# Patient Record
Sex: FEMALE | Race: WHITE | Employment: FULL TIME | URBAN - METROPOLITAN AREA
[De-identification: names, ages, dates, MRNs, and addresses within clinical notes are randomized per-mention and may not be internally consistent; named-entity substitution may affect disease eponyms.]

---

## 2017-02-03 ENCOUNTER — TELEPHONE (OUTPATIENT)
Dept: INTERNAL MEDICINE CLINIC | Age: 37
End: 2017-02-03

## 2017-02-03 ENCOUNTER — OFFICE VISIT (OUTPATIENT)
Dept: INTERNAL MEDICINE CLINIC | Age: 37
End: 2017-02-03

## 2017-02-03 VITALS — HEART RATE: 60 BPM | DIASTOLIC BLOOD PRESSURE: 80 MMHG | SYSTOLIC BLOOD PRESSURE: 118 MMHG

## 2017-02-03 DIAGNOSIS — L30.8 OTHER ECZEMA: Primary | ICD-10-CM

## 2017-02-03 PROCEDURE — 99212 OFFICE O/P EST SF 10 MIN: CPT | Performed by: INTERNAL MEDICINE

## 2017-02-03 RX ORDER — TRIAMCINOLONE ACETONIDE 1 MG/G
CREAM TOPICAL
Qty: 453 G | Refills: 3 | Status: SHIPPED | OUTPATIENT
Start: 2017-02-03 | End: 2018-08-10

## 2017-02-22 ENCOUNTER — TELEPHONE (OUTPATIENT)
Dept: INTERNAL MEDICINE CLINIC | Age: 37
End: 2017-02-22

## 2017-02-22 ENCOUNTER — TELEMEDICINE (OUTPATIENT)
Dept: INTERNAL MEDICINE CLINIC | Age: 37
End: 2017-02-22

## 2017-02-22 DIAGNOSIS — R53.81 MALAISE: ICD-10-CM

## 2017-02-22 DIAGNOSIS — R21 RASH AND NONSPECIFIC SKIN ERUPTION: Primary | ICD-10-CM

## 2017-02-22 PROCEDURE — 99213 OFFICE O/P EST LOW 20 MIN: CPT | Performed by: INTERNAL MEDICINE

## 2017-02-22 RX ORDER — ACETAMINOPHEN 500 MG
1000 TABLET ORAL EVERY 6 HOURS PRN
Qty: 120 TABLET | Refills: 3 | Status: SHIPPED | OUTPATIENT
Start: 2017-02-22

## 2017-02-22 RX ORDER — PREDNISONE 20 MG/1
20 TABLET ORAL DAILY
Qty: 5 TABLET | Refills: 0 | Status: SHIPPED | OUTPATIENT
Start: 2017-02-22 | End: 2017-02-27

## 2017-02-28 ENCOUNTER — TELEPHONE (OUTPATIENT)
Dept: DERMATOLOGY | Age: 37
End: 2017-02-28

## 2017-02-28 ENCOUNTER — TELEPHONE (OUTPATIENT)
Dept: INTERNAL MEDICINE CLINIC | Age: 37
End: 2017-02-28

## 2017-03-01 ENCOUNTER — OFFICE VISIT (OUTPATIENT)
Dept: DERMATOLOGY | Age: 37
End: 2017-03-01

## 2017-03-01 DIAGNOSIS — L50.8 ACUTE URTICARIA: Primary | ICD-10-CM

## 2017-03-01 PROCEDURE — 99202 OFFICE O/P NEW SF 15 MIN: CPT | Performed by: DERMATOLOGY

## 2017-03-01 RX ORDER — HYDROXYZINE HYDROCHLORIDE 25 MG/1
TABLET, FILM COATED ORAL
Qty: 60 TABLET | Refills: 1 | Status: SHIPPED | OUTPATIENT
Start: 2017-03-01 | End: 2017-08-02 | Stop reason: CLARIF

## 2017-03-01 RX ORDER — LORATADINE 10 MG/1
10 TABLET ORAL DAILY
Qty: 30 TABLET | Refills: 2 | Status: SHIPPED | OUTPATIENT
Start: 2017-03-01 | End: 2018-08-10

## 2017-03-02 RX ORDER — PREDNISONE 10 MG/1
TABLET ORAL
Qty: 40 TABLET | Refills: 0 | Status: SHIPPED | OUTPATIENT
Start: 2017-03-02 | End: 2017-06-09

## 2017-03-13 ENCOUNTER — OFFICE VISIT (OUTPATIENT)
Dept: DERMATOLOGY | Age: 37
End: 2017-03-13

## 2017-03-13 DIAGNOSIS — L50.8 ACUTE URTICARIA: Primary | ICD-10-CM

## 2017-03-13 PROCEDURE — 99213 OFFICE O/P EST LOW 20 MIN: CPT | Performed by: DERMATOLOGY

## 2017-04-10 ENCOUNTER — OFFICE VISIT (OUTPATIENT)
Dept: DERMATOLOGY | Age: 37
End: 2017-04-10

## 2017-04-10 DIAGNOSIS — L73.2 HIDRADENITIS SUPPURATIVA: ICD-10-CM

## 2017-04-10 DIAGNOSIS — L50.8 ACUTE URTICARIA: Primary | ICD-10-CM

## 2017-04-10 PROCEDURE — 99213 OFFICE O/P EST LOW 20 MIN: CPT | Performed by: DERMATOLOGY

## 2017-04-10 RX ORDER — MINOCYCLINE HYDROCHLORIDE 50 MG/1
50 CAPSULE ORAL 2 TIMES DAILY
Qty: 60 CAPSULE | Refills: 2 | Status: SHIPPED | OUTPATIENT
Start: 2017-04-10 | End: 2017-07-21 | Stop reason: SINTOL

## 2017-04-10 RX ORDER — CLINDAMYCIN PHOSPHATE 11.9 MG/ML
SOLUTION TOPICAL
Qty: 60 ML | Refills: 4 | Status: SHIPPED | OUTPATIENT
Start: 2017-04-10

## 2017-05-31 DIAGNOSIS — F41.9 ANXIETY: Primary | Chronic | ICD-10-CM

## 2017-06-26 ENCOUNTER — OFFICE VISIT (OUTPATIENT)
Dept: INTERNAL MEDICINE CLINIC | Age: 37
End: 2017-06-26

## 2017-06-26 VITALS
DIASTOLIC BLOOD PRESSURE: 88 MMHG | WEIGHT: 293 LBS | HEART RATE: 68 BPM | SYSTOLIC BLOOD PRESSURE: 132 MMHG | BODY MASS INDEX: 44.85 KG/M2

## 2017-06-26 DIAGNOSIS — F41.9 ANXIETY: Chronic | ICD-10-CM

## 2017-06-26 DIAGNOSIS — E66.01 MORBID OBESITY DUE TO EXCESS CALORIES (HCC): Chronic | ICD-10-CM

## 2017-06-26 DIAGNOSIS — E03.9 HYPOTHYROIDISM (ACQUIRED): Primary | Chronic | ICD-10-CM

## 2017-06-26 DIAGNOSIS — K52.9 ACUTE GASTROENTERITIS: ICD-10-CM

## 2017-06-26 LAB
A/G RATIO: 1.8 (ref 1.1–2.2)
ALBUMIN SERPL-MCNC: 4.2 G/DL (ref 3.4–5)
ALP BLD-CCNC: 71 U/L (ref 40–129)
ALT SERPL-CCNC: 11 U/L (ref 10–40)
ANION GAP SERPL CALCULATED.3IONS-SCNC: 13 MMOL/L (ref 3–16)
AST SERPL-CCNC: 11 U/L (ref 15–37)
BASOPHILS ABSOLUTE: 0 K/UL (ref 0–0.2)
BASOPHILS RELATIVE PERCENT: 0.5 %
BILIRUB SERPL-MCNC: <0.2 MG/DL (ref 0–1)
BILIRUBIN, POC: NORMAL
BLOOD URINE, POC: NORMAL
BUN BLDV-MCNC: 13 MG/DL (ref 7–20)
CALCIUM SERPL-MCNC: 9.4 MG/DL (ref 8.3–10.6)
CHLORIDE BLD-SCNC: 103 MMOL/L (ref 99–110)
CLARITY, POC: NORMAL
CO2: 26 MMOL/L (ref 21–32)
COLOR, POC: NORMAL
CREAT SERPL-MCNC: 0.7 MG/DL (ref 0.6–1.1)
EOSINOPHILS ABSOLUTE: 0.3 K/UL (ref 0–0.6)
EOSINOPHILS RELATIVE PERCENT: 4.8 %
GFR AFRICAN AMERICAN: >60
GFR NON-AFRICAN AMERICAN: >60
GLOBULIN: 2.4 G/DL
GLUCOSE BLD-MCNC: 116 MG/DL (ref 70–99)
GLUCOSE URINE, POC: NORMAL
HCT VFR BLD CALC: 41.8 % (ref 36–48)
HEMOGLOBIN: 13.9 G/DL (ref 12–16)
KETONES, POC: NORMAL
LEUKOCYTE EST, POC: NORMAL
LYMPHOCYTES ABSOLUTE: 1.7 K/UL (ref 1–5.1)
LYMPHOCYTES RELATIVE PERCENT: 26.3 %
MCH RBC QN AUTO: 29.3 PG (ref 26–34)
MCHC RBC AUTO-ENTMCNC: 33.2 G/DL (ref 31–36)
MCV RBC AUTO: 88.4 FL (ref 80–100)
MONOCYTES ABSOLUTE: 0.4 K/UL (ref 0–1.3)
MONOCYTES RELATIVE PERCENT: 5.9 %
NEUTROPHILS ABSOLUTE: 4.1 K/UL (ref 1.7–7.7)
NEUTROPHILS RELATIVE PERCENT: 62.5 %
NITRITE, POC: NORMAL
PDW BLD-RTO: 14 % (ref 12.4–15.4)
PH, POC: 6
PLATELET # BLD: 190 K/UL (ref 135–450)
PMV BLD AUTO: 9.9 FL (ref 5–10.5)
POTASSIUM SERPL-SCNC: 4 MMOL/L (ref 3.5–5.1)
PROTEIN, POC: NORMAL
RBC # BLD: 4.73 M/UL (ref 4–5.2)
SODIUM BLD-SCNC: 142 MMOL/L (ref 136–145)
SPECIFIC GRAVITY, POC: 1.02
T4 FREE: 1.1 NG/DL (ref 0.9–1.8)
TOTAL PROTEIN: 6.6 G/DL (ref 6.4–8.2)
TSH SERPL DL<=0.05 MIU/L-ACNC: 3.25 UIU/ML (ref 0.27–4.2)
UROBILINOGEN, POC: 0.2
WBC # BLD: 6.6 K/UL (ref 4–11)

## 2017-06-26 PROCEDURE — 99213 OFFICE O/P EST LOW 20 MIN: CPT | Performed by: INTERNAL MEDICINE

## 2017-06-26 PROCEDURE — 81002 URINALYSIS NONAUTO W/O SCOPE: CPT | Performed by: INTERNAL MEDICINE

## 2017-06-26 RX ORDER — DIPHENOXYLATE HYDROCHLORIDE AND ATROPINE SULFATE 2.5; .025 MG/1; MG/1
TABLET ORAL
Qty: 30 TABLET | Refills: 1 | Status: SHIPPED | OUTPATIENT
Start: 2017-06-26 | End: 2017-07-06

## 2017-06-26 RX ORDER — ONDANSETRON 4 MG/1
4 TABLET, FILM COATED ORAL EVERY 8 HOURS PRN
Qty: 30 TABLET | Refills: 0 | Status: SHIPPED | OUTPATIENT
Start: 2017-06-26 | End: 2017-10-11 | Stop reason: CLARIF

## 2017-07-27 ENCOUNTER — TELEPHONE (OUTPATIENT)
Dept: DERMATOLOGY | Age: 37
End: 2017-07-27

## 2017-07-27 DIAGNOSIS — T36.4X5A: Primary | ICD-10-CM

## 2017-07-27 DIAGNOSIS — H53.9 VISION CHANGES: ICD-10-CM

## 2017-08-02 ENCOUNTER — OFFICE VISIT (OUTPATIENT)
Dept: NEUROLOGY | Age: 37
End: 2017-08-02

## 2017-08-02 VITALS
SYSTOLIC BLOOD PRESSURE: 155 MMHG | WEIGHT: 289 LBS | DIASTOLIC BLOOD PRESSURE: 107 MMHG | BODY MASS INDEX: 43.94 KG/M2 | HEART RATE: 75 BPM

## 2017-08-02 DIAGNOSIS — G93.2 PSEUDOTUMOR CEREBRI: Primary | ICD-10-CM

## 2017-08-02 DIAGNOSIS — E66.01 MORBID OBESITY DUE TO EXCESS CALORIES (HCC): Chronic | ICD-10-CM

## 2017-08-02 DIAGNOSIS — H47.11 BILATERAL PAPILLEDEMA DUE TO RAISED INTRACRANIAL PRESSURE: ICD-10-CM

## 2017-08-02 PROCEDURE — 99245 OFF/OP CONSLTJ NEW/EST HI 55: CPT | Performed by: PSYCHIATRY & NEUROLOGY

## 2017-08-03 ENCOUNTER — HOSPITAL ENCOUNTER (OUTPATIENT)
Dept: GENERAL RADIOLOGY | Age: 37
Discharge: HOME | End: 2017-08-03
Attending: PSYCHIATRY & NEUROLOGY | Admitting: PSYCHIATRY & NEUROLOGY

## 2017-08-03 VITALS
OXYGEN SATURATION: 99 % | HEIGHT: 68 IN | SYSTOLIC BLOOD PRESSURE: 135 MMHG | WEIGHT: 289 LBS | RESPIRATION RATE: 16 BRPM | BODY MASS INDEX: 43.8 KG/M2 | HEART RATE: 65 BPM | DIASTOLIC BLOOD PRESSURE: 69 MMHG | TEMPERATURE: 97 F

## 2017-08-03 DIAGNOSIS — H47.11 BILATERAL PAPILLEDEMA DUE TO RAISED INTRACRANIAL PRESSURE: ICD-10-CM

## 2017-08-03 DIAGNOSIS — G93.2 BENIGN INTRACRANIAL HYPERTENSION: ICD-10-CM

## 2017-08-03 DIAGNOSIS — G93.2 PSEUDOTUMOR CEREBRI: ICD-10-CM

## 2017-08-03 LAB
APPEARANCE CSF: CLEAR
APTT: 34 SEC (ref 24.1–34.9)
CLOT EVALUATION CSF: ABNORMAL
COLOR CSF: COLORLESS
GLUCOSE, CSF: 58 MG/DL (ref 40–80)
INR BLD: 1 (ref 0.85–1.15)
LYMPHS CSF: 100 % (ref 40–80)
NO DIFFERENTIAL CSF: ABNORMAL
NUMBER OF CELLS CSF: 3
PROTEIN CSF: 32 MG/DL (ref 15–45)
PROTHROMBIN TIME: 11.3 SEC (ref 9.6–13)
RBC CSF: 3 /CUMM
TUBE NUMBER CSF: ABNORMAL
TUBE NUMBER CSF: NORMAL
VOLUME CSF: 4 ML
VOLUME CSF: 5 ML
WBC CSF: 2 /CUMM (ref 0–5)

## 2017-08-03 RX ORDER — SODIUM CHLORIDE 9 MG/ML
INJECTION, SOLUTION INTRAVENOUS
Status: DISPENSED
Start: 2017-08-03 | End: 2017-08-03

## 2017-08-03 RX ORDER — ACETAMINOPHEN 325 MG/1
650 TABLET ORAL EVERY 4 HOURS PRN
Status: DISCONTINUED | OUTPATIENT
Start: 2017-08-03 | End: 2017-08-04 | Stop reason: HOSPADM

## 2017-08-03 RX ORDER — LIDOCAINE HYDROCHLORIDE 10 MG/ML
5 INJECTION, SOLUTION EPIDURAL; INFILTRATION; INTRACAUDAL; PERINEURAL ONCE
Status: COMPLETED | OUTPATIENT
Start: 2017-08-03 | End: 2017-08-03

## 2017-08-03 RX ADMIN — ACETAMINOPHEN 650 MG: 325 TABLET ORAL at 13:45

## 2017-08-03 RX ADMIN — LIDOCAINE HYDROCHLORIDE 5 ML: 10 INJECTION, SOLUTION EPIDURAL; INFILTRATION; INTRACAUDAL; PERINEURAL at 13:16

## 2017-08-03 ASSESSMENT — PAIN DESCRIPTION - LOCATION
LOCATION: HEAD

## 2017-08-03 ASSESSMENT — PAIN SCALES - GENERAL
PAINLEVEL_OUTOF10: 7
PAINLEVEL_OUTOF10: 6
PAINLEVEL_OUTOF10: 8

## 2017-08-03 ASSESSMENT — PAIN DESCRIPTION - PAIN TYPE
TYPE: ACUTE PAIN
TYPE: CHRONIC PAIN
TYPE: ACUTE PAIN

## 2017-08-03 ASSESSMENT — PAIN DESCRIPTION - DESCRIPTORS
DESCRIPTORS: HEADACHE
DESCRIPTORS: HEADACHE

## 2017-08-03 ASSESSMENT — PAIN - FUNCTIONAL ASSESSMENT: PAIN_FUNCTIONAL_ASSESSMENT: 0-10

## 2017-08-07 ENCOUNTER — OFFICE VISIT (OUTPATIENT)
Dept: PSYCHIATRY | Age: 37
End: 2017-08-07

## 2017-08-07 ENCOUNTER — TELEPHONE (OUTPATIENT)
Dept: NEUROLOGY | Age: 37
End: 2017-08-07

## 2017-08-07 ENCOUNTER — HOSPITAL ENCOUNTER (OUTPATIENT)
Dept: SURGERY | Age: 37
Discharge: HOME | End: 2017-08-07
Attending: FAMILY MEDICINE | Admitting: FAMILY MEDICINE

## 2017-08-07 VITALS
DIASTOLIC BLOOD PRESSURE: 84 MMHG | HEART RATE: 72 BPM | HEIGHT: 68 IN | BODY MASS INDEX: 44.41 KG/M2 | SYSTOLIC BLOOD PRESSURE: 128 MMHG | WEIGHT: 293 LBS

## 2017-08-07 VITALS
DIASTOLIC BLOOD PRESSURE: 108 MMHG | RESPIRATION RATE: 18 BRPM | SYSTOLIC BLOOD PRESSURE: 156 MMHG | HEART RATE: 76 BPM | OXYGEN SATURATION: 98 %

## 2017-08-07 DIAGNOSIS — F41.1 GENERALIZED ANXIETY DISORDER: ICD-10-CM

## 2017-08-07 DIAGNOSIS — G97.1 POST LUMBAR PUNCTURE HEADACHE: Primary | ICD-10-CM

## 2017-08-07 PROCEDURE — 99204 OFFICE O/P NEW MOD 45 MIN: CPT | Performed by: PSYCHIATRY & NEUROLOGY

## 2017-08-07 RX ORDER — DIAZEPAM 5 MG/1
5 TABLET ORAL EVERY 12 HOURS PRN
Qty: 60 TABLET | Refills: 1 | Status: SHIPPED | OUTPATIENT
Start: 2017-08-07 | End: 2017-10-04 | Stop reason: SDUPTHER

## 2017-08-07 RX ORDER — SODIUM CHLORIDE 9 MG/ML
INJECTION, SOLUTION INTRAVENOUS
Status: DISCONTINUED
Start: 2017-08-07 | End: 2017-08-08 | Stop reason: HOSPADM

## 2017-08-07 ASSESSMENT — PATIENT HEALTH QUESTIONNAIRE - PHQ9
9. THOUGHTS THAT YOU WOULD BE BETTER OFF DEAD, OR OF HURTING YOURSELF: 0
SUM OF ALL RESPONSES TO PHQ QUESTIONS 1-9: 1
1. LITTLE INTEREST OR PLEASURE IN DOING THINGS: 0
SUM OF ALL RESPONSES TO PHQ9 QUESTIONS 1 & 2: 0
6. FEELING BAD ABOUT YOURSELF - OR THAT YOU ARE A FAILURE OR HAVE LET YOURSELF OR YOUR FAMILY DOWN: 0
3. TROUBLE FALLING OR STAYING ASLEEP: 1
7. TROUBLE CONCENTRATING ON THINGS, SUCH AS READING THE NEWSPAPER OR WATCHING TELEVISION: 0
5. POOR APPETITE OR OVEREATING: 0
2. FEELING DOWN, DEPRESSED OR HOPELESS: 0
10. IF YOU CHECKED OFF ANY PROBLEMS, HOW DIFFICULT HAVE THESE PROBLEMS MADE IT FOR YOU TO DO YOUR WORK, TAKE CARE OF THINGS AT HOME, OR GET ALONG WITH OTHER PEOPLE: 0
4. FEELING TIRED OR HAVING LITTLE ENERGY: 0
8. MOVING OR SPEAKING SO SLOWLY THAT OTHER PEOPLE COULD HAVE NOTICED. OR THE OPPOSITE, BEING SO FIGETY OR RESTLESS THAT YOU HAVE BEEN MOVING AROUND A LOT MORE THAN USUAL: 0

## 2017-08-07 ASSESSMENT — ANXIETY QUESTIONNAIRES
3. WORRYING TOO MUCH ABOUT DIFFERENT THINGS: 1-SEVERAL DAYS
5. BEING SO RESTLESS THAT IT IS HARD TO SIT STILL: 0-NOT AT ALL SURE
7. FEELING AFRAID AS IF SOMETHING AWFUL MIGHT HAPPEN: 1-SEVERAL DAYS
2. NOT BEING ABLE TO STOP OR CONTROL WORRYING: 1-SEVERAL DAYS
4. TROUBLE RELAXING: 1-SEVERAL DAYS
1. FEELING NERVOUS, ANXIOUS, OR ON EDGE: 1-SEVERAL DAYS
GAD7 TOTAL SCORE: 6
6. BECOMING EASILY ANNOYED OR IRRITABLE: 1-SEVERAL DAYS

## 2017-08-07 ASSESSMENT — ENCOUNTER SYMPTOMS: SHORTNESS OF BREATH: 0

## 2017-08-09 ENCOUNTER — TELEPHONE (OUTPATIENT)
Dept: DERMATOLOGY | Age: 37
End: 2017-08-09

## 2017-08-09 ENCOUNTER — HOSPITAL ENCOUNTER (OUTPATIENT)
Dept: MRI IMAGING | Age: 37
Discharge: HOME | End: 2017-08-09
Attending: PSYCHIATRY & NEUROLOGY | Admitting: OPHTHALMOLOGY

## 2017-08-09 DIAGNOSIS — G93.2 BENIGN INTRACRANIAL HYPERTENSION: ICD-10-CM

## 2017-08-09 DIAGNOSIS — H47.10 PAPILLEDEMA: ICD-10-CM

## 2017-08-09 RX ORDER — 0.9 % SODIUM CHLORIDE 0.9 %
10 VIAL (ML) INJECTION
Status: COMPLETED | OUTPATIENT
Start: 2017-08-09 | End: 2017-08-09

## 2017-08-09 RX ADMIN — Medication 10 ML: at 10:27

## 2017-08-11 ENCOUNTER — OFFICE VISIT (OUTPATIENT)
Dept: NEUROLOGY | Age: 37
End: 2017-08-11

## 2017-08-11 VITALS
DIASTOLIC BLOOD PRESSURE: 107 MMHG | HEART RATE: 85 BPM | SYSTOLIC BLOOD PRESSURE: 156 MMHG | HEIGHT: 68 IN | BODY MASS INDEX: 44.1 KG/M2 | WEIGHT: 291 LBS

## 2017-08-11 DIAGNOSIS — E66.01 MORBID OBESITY DUE TO EXCESS CALORIES (HCC): Chronic | ICD-10-CM

## 2017-08-11 DIAGNOSIS — G93.2 PSEUDOTUMOR CEREBRI: Primary | ICD-10-CM

## 2017-08-11 DIAGNOSIS — H47.11 BILATERAL PAPILLEDEMA DUE TO RAISED INTRACRANIAL PRESSURE: ICD-10-CM

## 2017-08-11 PROCEDURE — 99214 OFFICE O/P EST MOD 30 MIN: CPT | Performed by: PSYCHIATRY & NEUROLOGY

## 2017-08-11 RX ORDER — ACETAZOLAMIDE 250 MG/1
250 TABLET ORAL 2 TIMES DAILY
Qty: 60 TABLET | Refills: 0 | Status: SHIPPED | OUTPATIENT
Start: 2017-08-11 | End: 2017-09-11 | Stop reason: SDUPTHER

## 2017-09-08 ENCOUNTER — HOSPITAL ENCOUNTER (OUTPATIENT)
Dept: OTHER | Age: 37
Discharge: HOME | End: 2017-09-08
Attending: PSYCHIATRY & NEUROLOGY | Admitting: PSYCHIATRY & NEUROLOGY

## 2017-09-08 DIAGNOSIS — G93.2 PSEUDOTUMOR CEREBRI: ICD-10-CM

## 2017-09-08 LAB
ANION GAP SERPL CALCULATED.3IONS-SCNC: 12 MMOL/L (ref 3–16)
BUN BLDV-MCNC: 17 MG/DL (ref 7–20)
CALCIUM SERPL-MCNC: 9.2 MG/DL (ref 8.3–10.6)
CHLORIDE BLD-SCNC: 109 MMOL/L (ref 99–110)
CO2: 20 MMOL/L (ref 21–32)
CREAT SERPL-MCNC: 0.9 MG/DL (ref 0.6–1.1)
GFR AFRICAN AMERICAN: >60
GFR NON-AFRICAN AMERICAN: >60
GLUCOSE BLD-MCNC: 96 MG/DL (ref 70–99)
POTASSIUM SERPL-SCNC: 4.3 MMOL/L (ref 3.5–5.1)
SODIUM BLD-SCNC: 141 MMOL/L (ref 136–145)

## 2017-09-11 ENCOUNTER — OFFICE VISIT (OUTPATIENT)
Dept: NEUROLOGY | Age: 37
End: 2017-09-11

## 2017-09-11 VITALS
WEIGHT: 279 LBS | SYSTOLIC BLOOD PRESSURE: 146 MMHG | BODY MASS INDEX: 42.28 KG/M2 | HEIGHT: 68 IN | DIASTOLIC BLOOD PRESSURE: 97 MMHG | HEART RATE: 65 BPM

## 2017-09-11 DIAGNOSIS — H53.8 BLURRED VISION: ICD-10-CM

## 2017-09-11 DIAGNOSIS — G93.2 PSEUDOTUMOR CEREBRI: ICD-10-CM

## 2017-09-11 DIAGNOSIS — E66.01 MORBID OBESITY DUE TO EXCESS CALORIES (HCC): Primary | Chronic | ICD-10-CM

## 2017-09-11 PROCEDURE — 99214 OFFICE O/P EST MOD 30 MIN: CPT | Performed by: PSYCHIATRY & NEUROLOGY

## 2017-09-11 RX ORDER — ACETAZOLAMIDE 250 MG/1
250 TABLET ORAL 2 TIMES DAILY
Qty: 60 TABLET | Refills: 0 | Status: SHIPPED | OUTPATIENT
Start: 2017-09-11 | End: 2017-10-11 | Stop reason: SDUPTHER

## 2017-09-19 ENCOUNTER — TELEPHONE (OUTPATIENT)
Dept: INTERNAL MEDICINE CLINIC | Age: 37
End: 2017-09-19

## 2017-10-04 ENCOUNTER — OFFICE VISIT (OUTPATIENT)
Dept: PSYCHIATRY | Age: 37
End: 2017-10-04

## 2017-10-04 VITALS
HEIGHT: 68 IN | SYSTOLIC BLOOD PRESSURE: 120 MMHG | HEART RATE: 60 BPM | BODY MASS INDEX: 42.04 KG/M2 | DIASTOLIC BLOOD PRESSURE: 78 MMHG | WEIGHT: 277.4 LBS

## 2017-10-04 DIAGNOSIS — F41.1 GENERALIZED ANXIETY DISORDER: Primary | ICD-10-CM

## 2017-10-04 DIAGNOSIS — F32.89 DEPRESSIVE DISORDER, NOT ELSEWHERE CLASSIFIED: ICD-10-CM

## 2017-10-04 PROCEDURE — 99214 OFFICE O/P EST MOD 30 MIN: CPT | Performed by: PSYCHIATRY & NEUROLOGY

## 2017-10-04 RX ORDER — DIAZEPAM 5 MG/1
5 TABLET ORAL EVERY 12 HOURS PRN
Qty: 60 TABLET | Refills: 1 | Status: SHIPPED | OUTPATIENT
Start: 2017-10-06 | End: 2017-11-20 | Stop reason: SDUPTHER

## 2017-10-04 NOTE — MR AVS SNAPSHOT
type 2 diabetes, stroke, gallstones, arthritis, sleep apnea, and certain cancers. BMI is not perfect. It may overestimate body fat in athletes and people who are more muscular. Even a small weight loss (between 5 and 10 percent of your current weight) by decreasing your calorie intake and becoming more physically active will help lower your risk of developing or worsening diseases associated with obesity. Learn more at: [email or web address]          Instructions    Counseling resources:   [facility]   Phone: [phone]  Locations: [place]: [facility], [address], [place], [address]: [facility], [facility], [address], [address], [place], [address] [facility]  [address]: [address] [place] [address]. [address], [facility], [address] [place] [address]  [address]: [address], [place], [address] [facility]  Phone: [phone]  Locations: [facility]: [address] [facility], [address] [facility], [place], [address] [facility]: [facility], [address], [facility], [address]  [facility]: [address], [address] [facility], [place], [address] [facility]: [address], [address] [facility], [address]: Adair Fuentes [place], [address] [facility][1]                                 Today's Medication Changes          These changes are accurate as of: 10/4/17 10:01 AM.  If you have any questions, ask your nurse or doctor. CHANGE how you take these medications           * sertraline 50 MG tablet   Commonly known as:  ZOLOFT   Instructions: Take 1 tablet by mouth daily   Quantity:  30 tablet   Refills:  1   What changed:  Another medication with the same name was added. Make sure you understand how and when to take each.    Changed by:  Jhonny Bartholomew MD       * sertraline 50 MG tablet   Commonly known as:  ZOLOFT Instructions: Take 1 tablet by mouth daily   Quantity:  30 tablet   Refills:  0   What changed: You were already taking a medication with the same name, and this prescription was added. Make sure you understand how and when to take each. Changed by:  Aida Torres MD       * Notice: This list has 2 medication(s) that are the same as other medications prescribed for you. Read the directions carefully, and ask your doctor or other care provider to review them with you. Where to Get Your Medications      These medications were sent to [address] [place] [address] [address]  [address]  Roger Hobson [address] [address]    Hours:  24-hours Phone:  [phone]     sertraline 50 MG tablet               Your Current Medications Are              sertraline (ZOLOFT) 50 MG tablet Take 1 tablet by mouth daily    spironolactone (ALDACTONE) 25 MG tablet TAKE 1 TABLET BY MOUTH DAILY    acetaZOLAMIDE (DIAMOX) 250 MG tablet Take 1 tablet by mouth 2 times daily    diazepam (VALIUM) 5 MG tablet Take 1 tablet by mouth every 12 hours as needed for Anxiety    sertraline (ZOLOFT) 50 MG tablet Take 1 tablet by mouth daily    levothyroxine (SYNTHROID) 125 MCG tablet TAKE 1 TABLET BY MOUTH DAILY    ondansetron (ZOFRAN) 4 MG tablet Take 1 tablet by mouth every 8 hours as needed for Nausea or Vomiting    clindamycin (CLEOCIN T) 1 % external solution Apply to affected areas twice daily. loratadine (CLARITIN) 10 MG tablet Take 1 tablet by mouth daily    acetaminophen (APAP EXTRA STRENGTH) 500 MG tablet Take 2 tablets by mouth every 6 hours as needed for Pain    triamcinolone (KENALOG) 0.1 % cream Apply topically 2 times daily.     valACYclovir (VALTREX) 500 MG tablet Take 500 mg by mouth 3 times daily as needed (GENITAL HERPES)       Allergies              Minocycline Other (See Comments)    Headache and dizziness    Nsaids Other (See Comments)    KIDNEY FAILURE Additional Information        Basic Information     Date Of Birth Sex Race Ethnicity Preferred Language    1980 Female White Non-/Non  English      Problem List as of 10/4/2017  Date Reviewed: 9/11/2017                 History  of genital herpes    Hypothyroidism (acquired) (Chronic)    Hidradenitis suppurativa (Chronic)    Ganglion cyst    Tobacco use disorder (Chronic)    Anxiety (Chronic)    Morbid obesity (HCC) (Chronic)    Depressive disorder, not elsewhere classified    Allergic rhinitis due to pollen      Immunizations as of 10/4/2017     Name Date    Influenza Virus Vaccine 10/4/2013    Tdap (Boostrix, Adacel) 2/23/2014      Preventive Care        Date Due    Pneumococcal Vaccine - Pneumovax for adults aged 19-64 years with: chronic heart disease, chronic lung disease, diabetes mellitus, alcoholism, chronic liver disease, or cigarette smoking. (1 of 1 - PPSV23) 8/10/2001    Pap Smear 7/12/2016    Yearly Flu Vaccine (1) 9/1/2017    Tetanus Combination Vaccine (2 - Td) 2/23/2024            "[employer]t Signup           Our records indicate that you have an active Smart Adventure account. You can view your After Visit Summary by going to [email or web address] and logging in with your Smart Adventure username and password. If you don't have a Smart Adventure username and password but a parent or guardian has access to your record, the parent or guardian should login with their own [employer] Adventure username and password and access your record to view the After Visit Summary. Additional Information  If you have questions, please contact the physician practice where you receive care. Remember, Smart Adventure is NOT to be used for urgent needs. For medical emergencies, dial 911. For questions regarding your Smart Adventure account call [phone]. If you have a clinical question, please call your doctor's office.

## 2017-10-04 NOTE — PATIENT INSTRUCTIONS
Counseling resources:   [facility]ing [facility]   Phone: [phone]  Locations: [place]: [facility], [address], [place], [address]: [address], [place], [address], [address], [facility], [address] [facility]  [address]: [facility], [facility], [address] [facility] [address]: [address], [place], [address] [facility]  Phone: [phone]  Locations:    [place]: [address] [facility], [address] [facility], [place], [address] [facility]: [facility], [address], [facility], [address]  [place]: [address], [address] [facility], [place], [address] [facility]: [address], [address] [facility] [address] [facility], [address]: Ad[place] Fuentes [place], [address] [facility]

## 2017-10-04 NOTE — PROGRESS NOTES
PSYCHIATRY PROGRESS NOTE    Bianca Rosas  1980  10/04/17  Face to Face time: 25 min  PCP: Jordyn Galeas MD    ASSESSMENT:   29 yo F who struggles with poor frustration tolerance and anxiety, sensitivity to rejection. There has been partial management with diazepam. She is feeling less reliant on it and some initial improvement at low dose od zoloft. She is having side effects from diamox that I don't feel are necessarily signs of side effects of zoloft or sx of her mood and anxiety disorder. I believe the primary issue may be a generalized anxiety coupled with borderline personality structure.      1. Generalized anxiety disorder  2. Unspecified depressive disorder  3. Tobacco use disorder  4. Pseudotumor cerebri, Hypothyroidism, morbid obesity, hidrandenitis suppurativa  5. Recent health issues, insurance and occupational stressors     PLAN:   1. Continue diazepam 5mg BID. 2. Increase zoloft to 50mg daily for 4 weeks. Notify me at that time how you are doing, we may increase the dose to 75mg at that time if no major benefits, otherwise continue 50mg daily until next visit. 3. Provided referral resources for psychotherapy again.     >50% of time spent on counseling and education regarding medication, side effects, including sexual functioning and side effects. Also discussed role of psychotherapy in her treatment.      Medication Monitoring:    - OARRS reviewed, no issues noted      Follow-up: RTC in 6-8 weeks   Safety: RF include anxiety, hx of self harm. Pt is low risk for future dangerousness to self or others at this time  _________________________________    CC:   Chief Complaint   Patient presents with    Anxiety     S: Patient reports improvement in anxiety and irritability with sertraline for about the first month she took it, however over the last month she feels this effect has subsided. She has only been using 25 mg daily, did not know we were increasing it to 50 mg.   She knows less reliance on the diazepam, was not running out of it early or using it more than twice a day for her Addi Leon through anger episodes\". She can get suicidal thoughts but quickly arise and usually quickly go away as a response to high stress, she denies any intent to act on these thoughts. She reports some chronic fatigue, and some difficulty sleeping. She did feel the sertraline helps with her energy as well. Contributing factors at this time are being on Diamox for her pseudotumor which is causing a lot of nausea periodic vomiting, and fatigue. She has made a conscious effort to lose weight, and has lost weight because of the Diamox side effects, which she does feel has improved her sleep. She has noticed some reduced interest in initiating sex. She is not sure this is due to her fatigue, or a side effect of the Zoloft. She is not experiencing any anorgasmia. Did not follow through with any psychotherapy referrals. ROS: +headaches, no vision problems, dysuria, abd pain, chest pain or SOB. +n/v d/t diamox. Some reduce interest in initiating sex. +fatigue    Brief Medical Hx:   Pseudotumor cerebri, Hypothyroidism, morbid obesity, hidrandenitis suppurativa    Brief Psych Hx:  Med trials: lexapro, celexa, paxil (could not tolerate any of these, nausea), duloxetine (did not tolerate). Did take sertraline, not sure how long or what dose, but she did not have side effects.  Been on diazepam 5mg BID for over 10 yrs  NSSI: did cut self 4 times in late teens, but denies intent to end life    Current Outpatient Prescriptions   Medication Sig Dispense Refill    sertraline (ZOLOFT) 50 MG tablet Take 1 tablet by mouth daily 30 tablet 0    spironolactone (ALDACTONE) 25 MG tablet TAKE 1 TABLET BY MOUTH DAILY 30 tablet 5    acetaZOLAMIDE (DIAMOX) 250 MG tablet Take 1 tablet by mouth 2 times daily 60 tablet 0    diazepam (VALIUM) 5 MG tablet Take 1 tablet by mouth every 12 hours as needed for Anxiety 60 tablet 1    2017 4.3  3.5 - 5.1 mmol/L Final    Chloride 2017 109  99 - 110 mmol/L Final    CO2 2017 20* 21 - 32 mmol/L Final    Anion Gap 2017 12  3 - 16 Final    Glucose 2017 96  70 - 99 mg/dL Final    BUN 2017 17  7 - 20 mg/dL Final    CREATININE 2017 0.9  0.6 - 1.1 mg/dL Final    GFR Non- 2017 >60  >60 Final    Comment: >60 mL/min/1.73m2 EGFR, calc. for ages 25 and older using the  MDRD formula (not corrected for weight), is valid for stable  renal function.  GFR  2017 >60  >60 Final    Comment: Chronic Kidney Disease: less than 60 ml/min/1.73 sq.m. Kidney Failure: less than 15 ml/min/1.73 sq.m. Results valid for patients 18 years and older.  Calcium 2017 9.2  8.3 - 10.6 mg/dL Final       EK17: Rate 77 bpm, Normal sinus rhythm. Minimal voltage criteria for LVH, may be normal variant. QTc 443ms. ASSESSMENT:   31 yo F who struggles with poor frustration tolerance and anxiety, sensitivity to rejection. There has been partial management with diazepam. She is feeling less reliant on it and some initial improvement at low dose od zoloft. I believe the primary issue may be a generalized anxiety coupled with borderline personality structure.      1. Generalized anxiety disorder  2. Tobacco use disorder  3. Pseudotumor cerebri, Hypothyroidism, morbid obesity, hidrandenitis suppurativa  4. Recent health issues, insurance and occupational stressors     PLAN:   1. Continue diazepam 5mg BID. 2. Increase zoloft to 50mg daily for 4 weeks. Notify me at that time how you are doing, we may increase the dose to 75mg at that time if no major benefits, otherwise continue 50mg daily until next visit. 3. Provided referral resources for psychotherapy again.     >50% of time spent on counseling and education regarding medication, side effects, including sexual functioning and side effects.  Also discussed role of psychotherapy in her treatment.      Medication Monitoring:    - OARRS reviewed, no issues noted      Follow-up: RTC in 6-8 weeks   Safety: RF include anxiety, hx of self harm.  Pt is low risk for future dangerousness to self or others at this time    Ayala Montes MD  Psychiatrist

## 2017-10-11 ENCOUNTER — OFFICE VISIT (OUTPATIENT)
Dept: NEUROLOGY | Age: 37
End: 2017-10-11

## 2017-10-11 VITALS
BODY MASS INDEX: 41.83 KG/M2 | DIASTOLIC BLOOD PRESSURE: 96 MMHG | SYSTOLIC BLOOD PRESSURE: 140 MMHG | HEART RATE: 64 BPM | WEIGHT: 276 LBS | HEIGHT: 68 IN

## 2017-10-11 DIAGNOSIS — G93.2 PSEUDOTUMOR CEREBRI: ICD-10-CM

## 2017-10-11 PROCEDURE — 99213 OFFICE O/P EST LOW 20 MIN: CPT | Performed by: PSYCHIATRY & NEUROLOGY

## 2017-10-11 RX ORDER — ACETAZOLAMIDE 250 MG/1
250 TABLET ORAL 2 TIMES DAILY
Qty: 60 TABLET | Refills: 3 | Status: SHIPPED | OUTPATIENT
Start: 2017-10-11 | End: 2018-08-13 | Stop reason: SDUPTHER

## 2017-10-11 NOTE — PATIENT INSTRUCTIONS
prepared for the smoking urge to continue for a time. This is a lot to deal with, but keep at it. You will feel better. Follow-up care is a key part of your treatment and safety. Be sure to make and go to all appointments, and call your doctor if you are having problems. Its also a good idea to know your test results and keep a list of the medicines you take. How can you care for yourself at home? · Ask your family, friends, and coworkers for support. You have a better chance of quitting if you have help and support. · Join a support group, such as Nicotine Anonymous, for people who are trying to quit smoking. · Consider signing up for a smoking cessation program, such as the American Lung Association's Freedom from Smoking program.  · Set a quit date. Pick your date carefully so that it is not right in the middle of a big deadline or stressful time. Once you quit, do not even take a puff. Get rid of all ashtrays and lighters after your last cigarette. Clean your house and your clothes so that they do not smell of smoke. · Learn how to be a nonsmoker. Think about ways you can avoid those things that make you reach for a cigarette. ¨ Avoid situations that put you at greatest risk for smoking. For some people, it is hard to have a drink with friends without smoking. For others, they might skip a coffee break with coworkers who smoke. ¨ Change your daily routine. Take a different route to work or eat a meal in a different place. · Cut down on stress. Calm yourself or release tension by doing an activity you enjoy, such as reading a book, taking a hot bath, or gardening. · Talk to your doctor or pharmacist about nicotine replacement therapy, which replaces the nicotine in your body. You still get nicotine but you do not use tobacco. Nicotine replacement products help you slowly reduce the amount of nicotine you need.  These products come in several forms, many of them available over-the-counter:  ¨ Nicotine

## 2017-10-11 NOTE — PROGRESS NOTES
 Alcohol Abuse Father     Mental Illness Father      \"temper problems\"    Diabetes Mother     Hypertension Maternal Grandfather     Cancer Paternal Grandmother      pancreatic    Eclampsia Sister     Mental Illness Other      mother's side. details unclear    Rheum Arthritis Neg Hx     Osteoarthritis Neg Hx     Asthma Neg Hx     Breast Cancer Neg Hx     Heart Failure Neg Hx     High Cholesterol Neg Hx     Migraines Neg Hx     Ovarian Cancer Neg Hx     Rashes/Skin Problems Neg Hx     Seizures Neg Hx     Stroke Neg Hx     Thyroid Disease Neg Hx      Social History     Social History    Marital status:      Spouse name: N/A    Number of children: 1    Years of education: 14     Social History Main Topics    Smoking status: Current Every Day Smoker     Packs/day: 0.50     Years: 18.00     Types: Cigarettes    Smokeless tobacco: Never Used      Comment: states cutting down on cigarrettes; maybe 3 per day    Alcohol use 1.2 oz/week     2 Standard drinks or equivalent per week      Comment: social. 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 Childhood hx: good, [place], [place]. Father was a , 8 children in the home (6 biological siblings), pt grew up one of 4 girls. Moved to [place] age 8. She later found out he had cheated on her mom before this. Father started having an affair - a 25 yo female who moved into their home, pt was first to suspect but criticized by others. Pt moved out at age 18 b/c of this issue. Moved back in age 23 after her dad  of cancer. Later found out father had cheated many times before.     Education: did very well in school, enjoyed her time in school.  since 2003        Objective:  Exam:  BP (!) 140/96   Pulse 64   Ht 5' 8\" (1.727 m)   Wt 276 lb (125.2 kg)   BMI 41.97 kg/m²   This is an obese patient in no acute distress  Patient is awake, alert and oriented x3. Speech is normal.  Pupils are equal round reacting to light. Bilateral mild papilledema present but probably improved. Extraocular movements intact. Face symmetrical. Tongue midline. Motor exam shows normal symmetrical strength. Deep tendon reflexes normal. Plantar reflexes downgoing. Sensory exam normal. Coordination normal. Gait normal. No carotid bruit. No neck stiffness. Data :  LABS:  General Labs:    CBC:   Lab Results   Component Value Date    WBC 6.6 06/26/2017    RBC 4.73 06/26/2017    HGB 13.9 06/26/2017    HCT 41.8 06/26/2017    MCV 88.4 06/26/2017    MCH 29.3 06/26/2017    MCHC 33.2 06/26/2017    RDW 14.0 06/26/2017     06/26/2017    MPV 9.9 06/26/2017     BMP:    Lab Results   Component Value Date     09/08/2017    K 4.3 09/08/2017     09/08/2017    CO2 20 09/08/2017    BUN 17 09/08/2017    LABALBU 4.2 07/21/2017    CREATININE 0.9 09/08/2017    CALCIUM 9.2 09/08/2017    GFRAA >60 09/08/2017    GFRAA >60 09/19/2012    LABGLOM >60 09/08/2017    GLUCOSE 96 09/08/2017     MRI brain and MR venogram images were reviewed  Impression :  Pseudotumor cerebri  Opening pressure was 39 cm of CSF  MRI brain was normal  MR venogram showed mild stenosis of the transverse sinus but no thrombus was seen  Headaches and blurred vision have improved    Plan :  Discussed with patient  Continue Diamox 250 mg twice a day  Side effects were discussed. We discussed about the need for weight loss as a potential treatment for pseudotumor cerebri  Patient will try to continue to lose weight  Metabolic profile was reviewed. I will see her back in 4 months for follow-up        Please note a portion of  this chart was generated using dragon dictation software.  Although

## 2017-11-20 ENCOUNTER — OFFICE VISIT (OUTPATIENT)
Dept: PSYCHIATRY | Age: 37
End: 2017-11-20

## 2017-11-20 VITALS
DIASTOLIC BLOOD PRESSURE: 80 MMHG | WEIGHT: 285.2 LBS | BODY MASS INDEX: 43.22 KG/M2 | HEIGHT: 68 IN | SYSTOLIC BLOOD PRESSURE: 122 MMHG | HEART RATE: 60 BPM

## 2017-11-20 DIAGNOSIS — F41.1 GENERALIZED ANXIETY DISORDER: Primary | ICD-10-CM

## 2017-11-20 DIAGNOSIS — F17.200 TOBACCO USE DISORDER: Chronic | ICD-10-CM

## 2017-11-20 PROCEDURE — 99214 OFFICE O/P EST MOD 30 MIN: CPT | Performed by: PSYCHIATRY & NEUROLOGY

## 2017-11-20 RX ORDER — SERTRALINE HYDROCHLORIDE 100 MG/1
100 TABLET, FILM COATED ORAL DAILY
Qty: 30 TABLET | Refills: 1 | Status: SHIPPED | OUTPATIENT
Start: 2017-11-20 | End: 2018-01-22 | Stop reason: DRUGHIGH

## 2017-11-20 RX ORDER — DIAZEPAM 5 MG/1
5 TABLET ORAL 2 TIMES DAILY PRN
Qty: 60 TABLET | Refills: 1 | Status: SHIPPED | OUTPATIENT
Start: 2017-12-06 | End: 2018-01-22 | Stop reason: SDUPTHER

## 2017-11-21 NOTE — PROGRESS NOTES
of worst case scenarios, has difficulty controlling worry. Pt describes a \"fist of rage\" that comes on that leads her to use valium. She worries about acting out in ways she may have in the past. It appears at work she is able to handle interpersonal stressors with her manager quite well and quickly recognizes if she is too aggressive and corrects this, also with insight to her passive aggressive. However, irritability is more problematic with  and mother. Reports she has some emotional eating issues. Has had trouble sticking to her diet and has gained wt as a result. Plans to start exercising more. Is not reporting sexual side effects from sertraline. ROS: no headaches, no vision problems, dysuria, abd pain, chest pain or SOB. Brief Medical Hx:   Pseudotumor cerebri, Hypothyroidism, morbid obesity, hidrandenitis suppurativa    Brief Psych Hx:  Med trials: lexapro, celexa, paxil (could not tolerate any of these, nausea), duloxetine (did not tolerate). Did take sertraline, not sure how long or what dose, but she did not have side effects. Been on diazepam 5mg BID for over 10 yrs  NSSI: did cut self 4 times in late teens, but denies intent to end life    Current Outpatient Prescriptions   Medication Sig Dispense Refill    sertraline (ZOLOFT) 100 MG tablet Take 1 tablet by mouth daily 30 tablet 1    [START ON 12/6/2017] diazepam (VALIUM) 5 MG tablet Take 1 tablet by mouth 2 times daily as needed for Anxiety  Do not fill before 12/6/2017. 60 tablet 1    acetaZOLAMIDE (DIAMOX) 250 MG tablet Take 1 tablet by mouth 2 times daily 60 tablet 3    spironolactone (ALDACTONE) 25 MG tablet TAKE 1 TABLET BY MOUTH DAILY 30 tablet 5    levothyroxine (SYNTHROID) 125 MCG tablet TAKE 1 TABLET BY MOUTH DAILY 30 tablet 5    clindamycin (CLEOCIN T) 1 % external solution Apply to affected areas twice daily.  60 mL 4    loratadine (CLARITIN) 10 MG tablet Take 1 tablet by mouth daily 30 tablet 2   

## 2018-01-22 ENCOUNTER — OFFICE VISIT (OUTPATIENT)
Dept: PSYCHIATRY | Age: 38
End: 2018-01-22

## 2018-01-22 VITALS
HEIGHT: 67 IN | WEIGHT: 279.8 LBS | BODY MASS INDEX: 43.92 KG/M2 | DIASTOLIC BLOOD PRESSURE: 78 MMHG | SYSTOLIC BLOOD PRESSURE: 116 MMHG | HEART RATE: 70 BPM

## 2018-01-22 DIAGNOSIS — F41.1 GENERALIZED ANXIETY DISORDER: ICD-10-CM

## 2018-01-22 PROCEDURE — 99214 OFFICE O/P EST MOD 30 MIN: CPT | Performed by: PSYCHIATRY & NEUROLOGY

## 2018-01-22 RX ORDER — DIAZEPAM 5 MG/1
5 TABLET ORAL 2 TIMES DAILY PRN
Qty: 60 TABLET | Refills: 1 | Status: SHIPPED | OUTPATIENT
Start: 2018-01-22 | End: 2018-01-25 | Stop reason: SDUPTHER

## 2018-01-22 ASSESSMENT — PATIENT HEALTH QUESTIONNAIRE - PHQ9
SUM OF ALL RESPONSES TO PHQ9 QUESTIONS 1 & 2: 0
7. TROUBLE CONCENTRATING ON THINGS, SUCH AS READING THE NEWSPAPER OR WATCHING TELEVISION: 1
3. TROUBLE FALLING OR STAYING ASLEEP: 1
10. IF YOU CHECKED OFF ANY PROBLEMS, HOW DIFFICULT HAVE THESE PROBLEMS MADE IT FOR YOU TO DO YOUR WORK, TAKE CARE OF THINGS AT HOME, OR GET ALONG WITH OTHER PEOPLE: 1
1. LITTLE INTEREST OR PLEASURE IN DOING THINGS: 0
4. FEELING TIRED OR HAVING LITTLE ENERGY: 1
9. THOUGHTS THAT YOU WOULD BE BETTER OFF DEAD, OR OF HURTING YOURSELF: 0
2. FEELING DOWN, DEPRESSED OR HOPELESS: 0
8. MOVING OR SPEAKING SO SLOWLY THAT OTHER PEOPLE COULD HAVE NOTICED. OR THE OPPOSITE, BEING SO FIGETY OR RESTLESS THAT YOU HAVE BEEN MOVING AROUND A LOT MORE THAN USUAL: 0
SUM OF ALL RESPONSES TO PHQ QUESTIONS 1-9: 5
6. FEELING BAD ABOUT YOURSELF - OR THAT YOU ARE A FAILURE OR HAVE LET YOURSELF OR YOUR FAMILY DOWN: 1
5. POOR APPETITE OR OVEREATING: 1

## 2018-01-22 ASSESSMENT — ANXIETY QUESTIONNAIRES
7. FEELING AFRAID AS IF SOMETHING AWFUL MIGHT HAPPEN: 0-NOT AT ALL SURE
4. TROUBLE RELAXING: 1-SEVERAL DAYS
3. WORRYING TOO MUCH ABOUT DIFFERENT THINGS: 3-NEARLY EVERY DAY
1. FEELING NERVOUS, ANXIOUS, OR ON EDGE: 3-NEARLY EVERY DAY
2. NOT BEING ABLE TO STOP OR CONTROL WORRYING: 3-NEARLY EVERY DAY
GAD7 TOTAL SCORE: 11
5. BEING SO RESTLESS THAT IT IS HARD TO SIT STILL: 0-NOT AT ALL SURE
6. BECOMING EASILY ANNOYED OR IRRITABLE: 1-SEVERAL DAYS

## 2018-01-25 RX ORDER — DIAZEPAM 5 MG/1
5 TABLET ORAL 2 TIMES DAILY PRN
Qty: 60 TABLET | Refills: 1 | Status: SHIPPED | OUTPATIENT
Start: 2018-02-06 | End: 2018-03-19 | Stop reason: SDUPTHER

## 2018-01-25 NOTE — PROGRESS NOTES
PSYCHIATRY PROGRESS NOTE    Magen Curry  1980  01/22/2018  Face to Face time: 25 min  PCP: Benton Duckworth MD      ASSESSMENT:   29 yo F who struggles with poor frustration tolerance and anxiety. There has been partial management with diazepam. I believe the primary issue may be a generalized anxiety coupled with borderline personality structure. She is responding to sertraline and also incidentally noticing less issues with her specific phobia of ants on the medication, but still very psychologically dependent on diazepam, poor confidence in her own ability to manage difficult interpersonal situations without being aggressive.       1. Generalized anxiety disorder  2. Tobacco use disorder  3. Pseudotumor cerebri, Hypothyroidism, morbid obesity, hidrandenitis suppurativa  4. Recent health issues, insurance and occupational stressors     PLAN:   1. Continue diazepam 5mg BID. Encouraged her to try reducing to 2.5mg initially. 2. Increase zoloft to 150mg daily  3. Again discussed referral to Dr. Estelle Mittal to help her reduce reliance on diazepam as a coping strategy. She plans to schedule with her.      >50% of time spent on counseling and education regarding medication. Provided counseling on appropriate use of diazepam and suggested delaying taking to to provide opportunities to utilize other coping strategies.      Medication Monitoring:    - OARRS reviewed, no issues noted      Follow-up: RTC in 8 weeks   Safety: RF include anxiety, hx of self harm. Pt is low risk for future dangerousness to self or others at this time  _________________________________    CC:   Chief Complaint   Patient presents with    Anxiety     S: Pt reports continued improvement with the sertraline. She is tolerating 100mg well, feels it has helped her energy, irritability, anxiety, and has helped her mood feel more even and positive.  She is, however, reporting continued high stress and anxiety that she feels is a product of her

## 2018-01-29 ENCOUNTER — OFFICE VISIT (OUTPATIENT)
Dept: INTERNAL MEDICINE CLINIC | Age: 38
End: 2018-01-29

## 2018-01-29 VITALS
WEIGHT: 280 LBS | BODY MASS INDEX: 43.95 KG/M2 | SYSTOLIC BLOOD PRESSURE: 120 MMHG | HEIGHT: 67 IN | HEART RATE: 72 BPM | DIASTOLIC BLOOD PRESSURE: 84 MMHG

## 2018-01-29 DIAGNOSIS — E66.01 MORBID OBESITY (HCC): Chronic | ICD-10-CM

## 2018-01-29 DIAGNOSIS — E03.9 HYPOTHYROIDISM (ACQUIRED): Primary | Chronic | ICD-10-CM

## 2018-01-29 DIAGNOSIS — F17.200 TOBACCO USE DISORDER: Chronic | ICD-10-CM

## 2018-01-29 DIAGNOSIS — L73.2 HIDRADENITIS SUPPURATIVA: Chronic | ICD-10-CM

## 2018-01-29 DIAGNOSIS — F33.41 RECURRENT MAJOR DEPRESSIVE DISORDER, IN PARTIAL REMISSION (HCC): ICD-10-CM

## 2018-01-29 PROCEDURE — G8427 DOCREV CUR MEDS BY ELIG CLIN: HCPCS | Performed by: INTERNAL MEDICINE

## 2018-01-29 PROCEDURE — G8484 FLU IMMUNIZE NO ADMIN: HCPCS | Performed by: INTERNAL MEDICINE

## 2018-01-29 PROCEDURE — G8417 CALC BMI ABV UP PARAM F/U: HCPCS | Performed by: INTERNAL MEDICINE

## 2018-01-29 PROCEDURE — G9016 DEMO-SMOKING CESSATION COUN: HCPCS | Performed by: INTERNAL MEDICINE

## 2018-01-29 PROCEDURE — 4004F PT TOBACCO SCREEN RCVD TLK: CPT | Performed by: INTERNAL MEDICINE

## 2018-01-29 PROCEDURE — 99213 OFFICE O/P EST LOW 20 MIN: CPT | Performed by: INTERNAL MEDICINE

## 2018-01-29 RX ORDER — SPIRONOLACTONE 25 MG/1
TABLET ORAL
Qty: 30 TABLET | Refills: 5 | Status: SHIPPED | OUTPATIENT
Start: 2018-01-29 | End: 2018-07-29 | Stop reason: SDUPTHER

## 2018-01-29 RX ORDER — LEVOTHYROXINE SODIUM 0.12 MG/1
TABLET ORAL
Qty: 30 TABLET | Refills: 5 | Status: SHIPPED | OUTPATIENT
Start: 2018-01-29 | End: 2018-07-29 | Stop reason: SDUPTHER

## 2018-01-29 NOTE — PROGRESS NOTES
tablet, Take 1 tablet by mouth daily, Disp: 30 tablet, Rfl: 2    acetaminophen (APAP EXTRA STRENGTH) 500 MG tablet, Take 2 tablets by mouth every 6 hours as needed for Pain, Disp: 120 tablet, Rfl: 3    triamcinolone (KENALOG) 0.1 % cream, Apply topically 2 times daily. , Disp: 453 g, Rfl: 3    valACYclovir (VALTREX) 500 MG tablet, Take 500 mg by mouth 3 times daily as needed (GENITAL HERPES) , Disp: , Rfl:     Assessment/Plan:  Elaine Shen was seen today for hypothyroidism.     Diagnoses and all orders for this visit:    Hypothyroidism (acquired)  -     TSH without Reflex  -     T4, Free  -     levothyroxine (SYNTHROID) 125 MCG tablet; TAKE 1 TABLET BY MOUTH DAILY    Morbid obesity ([address]  Comments:  Related to thyroid    Hidradenitis suppurativa  -     spironolactone (ALDACTONE) 25 MG tablet; TAKE 1 TABLET BY MOUTH DAILY    Tobacco use disorder  -     CO DEMO-SMOKING CESSATION COUN    Recurrent major depressive disorder, in partial remission ([address]  Comments:  Seeing Dr. Maddy Bolivar MD

## 2018-01-30 LAB
T4 FREE: 1.3 NG/DL (ref 0.9–1.8)
TSH SERPL DL<=0.05 MIU/L-ACNC: 1.3 UIU/ML (ref 0.27–4.2)

## 2018-03-19 ENCOUNTER — OFFICE VISIT (OUTPATIENT)
Dept: PSYCHIATRY | Age: 38
End: 2018-03-19

## 2018-03-19 VITALS
SYSTOLIC BLOOD PRESSURE: 110 MMHG | DIASTOLIC BLOOD PRESSURE: 68 MMHG | BODY MASS INDEX: 44.29 KG/M2 | HEIGHT: 67 IN | OXYGEN SATURATION: 98 % | HEART RATE: 68 BPM | WEIGHT: 282.2 LBS

## 2018-03-19 DIAGNOSIS — F41.1 GENERALIZED ANXIETY DISORDER: ICD-10-CM

## 2018-03-19 PROCEDURE — 99214 OFFICE O/P EST MOD 30 MIN: CPT | Performed by: PSYCHIATRY & NEUROLOGY

## 2018-03-19 RX ORDER — DIAZEPAM 5 MG/1
5 TABLET ORAL 2 TIMES DAILY PRN
Qty: 55 TABLET | Refills: 2 | Status: SHIPPED | OUTPATIENT
Start: 2018-04-04 | End: 2018-06-03

## 2018-03-20 ASSESSMENT — PATIENT HEALTH QUESTIONNAIRE - PHQ9
9. THOUGHTS THAT YOU WOULD BE BETTER OFF DEAD, OR OF HURTING YOURSELF: 1
4. FEELING TIRED OR HAVING LITTLE ENERGY: 1
3. TROUBLE FALLING OR STAYING ASLEEP: 1
SUM OF ALL RESPONSES TO PHQ QUESTIONS 1-9: 5
5. POOR APPETITE OR OVEREATING: 1
8. MOVING OR SPEAKING SO SLOWLY THAT OTHER PEOPLE COULD HAVE NOTICED. OR THE OPPOSITE, BEING SO FIGETY OR RESTLESS THAT YOU HAVE BEEN MOVING AROUND A LOT MORE THAN USUAL: 0
7. TROUBLE CONCENTRATING ON THINGS, SUCH AS READING THE NEWSPAPER OR WATCHING TELEVISION: 0
1. LITTLE INTEREST OR PLEASURE IN DOING THINGS: 0
10. IF YOU CHECKED OFF ANY PROBLEMS, HOW DIFFICULT HAVE THESE PROBLEMS MADE IT FOR YOU TO DO YOUR WORK, TAKE CARE OF THINGS AT HOME, OR GET ALONG WITH OTHER PEOPLE: 1
SUM OF ALL RESPONSES TO PHQ9 QUESTIONS 1 & 2: 0
6. FEELING BAD ABOUT YOURSELF - OR THAT YOU ARE A FAILURE OR HAVE LET YOURSELF OR YOUR FAMILY DOWN: 1
2. FEELING DOWN, DEPRESSED OR HOPELESS: 0

## 2018-03-20 ASSESSMENT — ANXIETY QUESTIONNAIRES
3. WORRYING TOO MUCH ABOUT DIFFERENT THINGS: 2-OVER HALF THE DAYS
7. FEELING AFRAID AS IF SOMETHING AWFUL MIGHT HAPPEN: 2-OVER HALF THE DAYS
4. TROUBLE RELAXING: 2-OVER HALF THE DAYS
2. NOT BEING ABLE TO STOP OR CONTROL WORRYING: 2-OVER HALF THE DAYS
5. BEING SO RESTLESS THAT IT IS HARD TO SIT STILL: 0-NOT AT ALL SURE
6. BECOMING EASILY ANNOYED OR IRRITABLE: 2-OVER HALF THE DAYS
1. FEELING NERVOUS, ANXIOUS, OR ON EDGE: 1-SEVERAL DAYS
GAD7 TOTAL SCORE: 11

## 2018-04-11 ENCOUNTER — TELEPHONE (OUTPATIENT)
Dept: INTERNAL MEDICINE CLINIC | Age: 38
End: 2018-04-11

## 2018-08-10 ENCOUNTER — OFFICE VISIT (OUTPATIENT)
Dept: INTERNAL MEDICINE CLINIC | Age: 38
End: 2018-08-10

## 2018-08-10 VITALS
DIASTOLIC BLOOD PRESSURE: 78 MMHG | HEART RATE: 64 BPM | SYSTOLIC BLOOD PRESSURE: 118 MMHG | WEIGHT: 290 LBS | BODY MASS INDEX: 45.52 KG/M2 | HEIGHT: 67 IN

## 2018-08-10 DIAGNOSIS — E03.9 HYPOTHYROIDISM (ACQUIRED): Primary | Chronic | ICD-10-CM

## 2018-08-10 DIAGNOSIS — F41.9 ANXIETY: Chronic | ICD-10-CM

## 2018-08-10 DIAGNOSIS — E03.9 HYPOTHYROIDISM (ACQUIRED): Chronic | ICD-10-CM

## 2018-08-10 DIAGNOSIS — I27.20 PULMONARY HYPERTENSION (HCC): Chronic | ICD-10-CM

## 2018-08-10 DIAGNOSIS — E66.01 MORBID OBESITY (HCC): Chronic | ICD-10-CM

## 2018-08-10 DIAGNOSIS — F17.200 TOBACCO USE DISORDER: Chronic | ICD-10-CM

## 2018-08-10 LAB
T4 FREE: 1.2 NG/DL (ref 0.9–1.8)
TSH SERPL DL<=0.05 MIU/L-ACNC: 2.41 UIU/ML (ref 0.27–4.2)

## 2018-08-10 PROCEDURE — G8427 DOCREV CUR MEDS BY ELIG CLIN: HCPCS | Performed by: INTERNAL MEDICINE

## 2018-08-10 PROCEDURE — G8417 CALC BMI ABV UP PARAM F/U: HCPCS | Performed by: INTERNAL MEDICINE

## 2018-08-10 PROCEDURE — 99406 BEHAV CHNG SMOKING 3-10 MIN: CPT | Performed by: INTERNAL MEDICINE

## 2018-08-10 PROCEDURE — 4004F PT TOBACCO SCREEN RCVD TLK: CPT | Performed by: INTERNAL MEDICINE

## 2018-08-10 PROCEDURE — 99213 OFFICE O/P EST LOW 20 MIN: CPT | Performed by: INTERNAL MEDICINE

## 2018-08-10 NOTE — PROGRESS NOTES
acetaminophen (APAP EXTRA STRENGTH) 500 MG tablet, Take 2 tablets by mouth every 6 hours as needed for Pain, Disp: 120 tablet, Rfl: 3    triamcinolone (KENALOG) 0.1 % cream, Apply topically 2 times daily. , Disp: 453 g, Rfl: 3    Assessment/Plan:  Lea Tang was seen today for hypothyroidism. Diagnoses and all orders for this visit:    Hypothyroidism (acquired)  -     TSH without Reflex; Future  -     T4, Free; Future    Morbid obesity ([address]  Comments: There may be a correlation between this and her sleep apnea. Anxiety  Comments:  Chronic, controlled.      Tobacco use disorder  -     MD TOBACCO USE CESSATION INTERMEDIATE 3-10 MINUTES    Pulmonary hypertension (HCC)  Comments:  Consider sleep apnea  Orders:  -     Pulse oximetry, overnight; Future      Apolinar Muro MD

## 2018-08-13 ENCOUNTER — OFFICE VISIT (OUTPATIENT)
Dept: NEUROLOGY | Age: 38
End: 2018-08-13

## 2018-08-13 VITALS
BODY MASS INDEX: 44.25 KG/M2 | HEART RATE: 72 BPM | WEIGHT: 292 LBS | SYSTOLIC BLOOD PRESSURE: 152 MMHG | HEIGHT: 68 IN | DIASTOLIC BLOOD PRESSURE: 99 MMHG

## 2018-08-13 DIAGNOSIS — G93.2 PSEUDOTUMOR CEREBRI: Primary | ICD-10-CM

## 2018-08-13 DIAGNOSIS — H47.10 PAPILLEDEMA: ICD-10-CM

## 2018-08-13 PROCEDURE — G8417 CALC BMI ABV UP PARAM F/U: HCPCS | Performed by: PSYCHIATRY & NEUROLOGY

## 2018-08-13 PROCEDURE — 99214 OFFICE O/P EST MOD 30 MIN: CPT | Performed by: PSYCHIATRY & NEUROLOGY

## 2018-08-13 PROCEDURE — 4004F PT TOBACCO SCREEN RCVD TLK: CPT | Performed by: PSYCHIATRY & NEUROLOGY

## 2018-08-13 PROCEDURE — G8427 DOCREV CUR MEDS BY ELIG CLIN: HCPCS | Performed by: PSYCHIATRY & NEUROLOGY

## 2018-08-13 RX ORDER — ACETAZOLAMIDE 250 MG/1
250 TABLET ORAL 2 TIMES DAILY
Qty: 60 TABLET | Refills: 1 | Status: SHIPPED | OUTPATIENT
Start: 2018-08-13 | End: 2018-10-15 | Stop reason: SDUPTHER

## 2018-08-13 NOTE — PROGRESS NOTES
Elisha Gutierrez   Neurology followup    Subjective:   CC/HP  History was obtained from the patient and her Family. Interval history:  Patient has known pseudotumor cerebri. She had lost some weight and was on Diamox at one time but then stopped taking the Diamox. She had missed some appointments because of insurance reasons. Her weight is also back up until the last several months her headaches have come back and her vision has been getting worse. She was recently seen by her ophthalmologist again who found again reoccurrence of the papilledema. Detailed history:  Patient has had visual symptoms and headaches. Patient had a lumbar puncture and was found to have significant elevated opening pressure at 39 cm of CSF. After the lumbar puncture, patient had significant post spinal headaches but now is slowly recovering. Patient had a blood patch as well. Patient had briefly been on minocycline. No other focal neurological symptoms.   Patient had an MRI brain as well as MR venogram    REVIEW OF SYSTEMS    Constitutional:  []   Chills   [x]  Fatigue   []  Fevers   []  Malaise   []  Weight loss     [] Denies all of the above    Respiratory:   []  Cough    []  Shortness of breath         [x] Denies all of the above     Cardiovascular:   []  Chest pain    []  Exertional chest pressure/discomfort           [] Palpitations    []  Syncope     [x] Denies all of the above        Past Medical History:   Diagnosis Date     History  of genital herpes     Allergic rhinitis due to pollen 1/26/2012    Anxiety     Carbuncle of breast 12/22/2015    Carpal tunnel syndrome of right wrist 2/15/2016    Cervical dysplasia     LEEP X2 THEN CRYOSURGERY    Contact lens/glasses fitting     Depression     Depressive disorder, not elsewhere classified 1/26/2012    Ganglion cyst 10/14/2015    Hidradenitis suppurativa 7/29/2016    Hypothyroid     taking synthroid 50mcg    Postpartum hypertension 2/26/2014    only during incident with kidney failure--currently not medicated for  HTN    Vulval hidradenitis suppurativa 11/19/2015     Family History   Problem Relation Age of Onset    High Blood Pressure Father     Cancer Father         pancreatic    Diabetes Father     Alcohol Abuse Father     Mental Illness Father         \"temper problems\"    Diabetes Mother     Hypertension Maternal Grandfather     Cancer Paternal Grandmother         pancreatic    Eclampsia Sister     Mental Illness Other         mother's side. details unclear    Rheum Arthritis Neg Hx     Osteoarthritis Neg Hx     Asthma Neg Hx     Breast Cancer Neg Hx     Heart Failure Neg Hx     High Cholesterol Neg Hx     Migraines Neg Hx     Ovarian Cancer Neg Hx     Rashes/Skin Problems Neg Hx     Seizures Neg Hx     Stroke Neg Hx     Thyroid Disease Neg Hx      Social History     Social History    Marital status:      Spouse name: N/A    Number of children: 1    Years of education: 14     Social History Main Topics    Smoking status: Current Every Day Smoker     Packs/day: 0.50     Years: 18.00     Types: Cigarettes    Smokeless tobacco: Never Used      Comment: states cutting down on cigarrettes; maybe 3 per day    Alcohol use 1.2 oz/week     2 Standard drinks or equivalent per week      Comment: social. 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 Childhood hx: good, [place] in [place], [place]. Father was a , 8 children in the home (6 biological siblings), pt grew up one of 4 girls. Moved to [place] age 8. She later found out he had cheated on her mom before this.  Father started having an affair - a 25 yo female who moved into their continue to lose weight  I will see her back in 2 months with basic metabolic profile  High risk patient because of the increased morbidity and possibility of vision loss    Please note a portion of  this chart was generated using dragon dictation software. Although every effort was made to ensure the accuracy of this automated transcription, some errors in transcription may have occurred.

## 2018-08-17 ENCOUNTER — OFFICE VISIT (OUTPATIENT)
Dept: PSYCHIATRY | Age: 38
End: 2018-08-17

## 2018-08-17 VITALS
HEIGHT: 67 IN | SYSTOLIC BLOOD PRESSURE: 120 MMHG | DIASTOLIC BLOOD PRESSURE: 90 MMHG | WEIGHT: 292.8 LBS | BODY MASS INDEX: 45.96 KG/M2 | HEART RATE: 62 BPM

## 2018-08-17 DIAGNOSIS — F41.1 GENERALIZED ANXIETY DISORDER: Primary | ICD-10-CM

## 2018-08-17 DIAGNOSIS — F43.21 ADJUSTMENT DISORDER WITH DEPRESSED MOOD: ICD-10-CM

## 2018-08-17 PROCEDURE — 99214 OFFICE O/P EST MOD 30 MIN: CPT | Performed by: PSYCHIATRY & NEUROLOGY

## 2018-08-17 ASSESSMENT — ANXIETY QUESTIONNAIRES
GAD7 TOTAL SCORE: 12
2. NOT BEING ABLE TO STOP OR CONTROL WORRYING: 3-NEARLY EVERY DAY
6. BECOMING EASILY ANNOYED OR IRRITABLE: 2-OVER HALF THE DAYS
1. FEELING NERVOUS, ANXIOUS, OR ON EDGE: 2-OVER HALF THE DAYS
5. BEING SO RESTLESS THAT IT IS HARD TO SIT STILL: 0-NOT AT ALL SURE
3. WORRYING TOO MUCH ABOUT DIFFERENT THINGS: 3-NEARLY EVERY DAY
7. FEELING AFRAID AS IF SOMETHING AWFUL MIGHT HAPPEN: 2-OVER HALF THE DAYS
4. TROUBLE RELAXING: 0-NOT AT ALL SURE

## 2018-08-20 ENCOUNTER — OFFICE VISIT (OUTPATIENT)
Dept: PSYCHOLOGY | Age: 38
End: 2018-08-20

## 2018-08-20 DIAGNOSIS — F43.21 ADJUSTMENT DISORDER WITH DEPRESSED MOOD: ICD-10-CM

## 2018-08-20 DIAGNOSIS — F41.1 GAD (GENERALIZED ANXIETY DISORDER): Primary | ICD-10-CM

## 2018-08-20 PROCEDURE — 90791 PSYCH DIAGNOSTIC EVALUATION: CPT | Performed by: PSYCHOLOGIST

## 2018-08-20 ASSESSMENT — PATIENT HEALTH QUESTIONNAIRE - PHQ9
6. FEELING BAD ABOUT YOURSELF - OR THAT YOU ARE A FAILURE OR HAVE LET YOURSELF OR YOUR FAMILY DOWN: 3
3. TROUBLE FALLING OR STAYING ASLEEP: 3
1. LITTLE INTEREST OR PLEASURE IN DOING THINGS: 1
8. MOVING OR SPEAKING SO SLOWLY THAT OTHER PEOPLE COULD HAVE NOTICED. OR THE OPPOSITE, BEING SO FIGETY OR RESTLESS THAT YOU HAVE BEEN MOVING AROUND A LOT MORE THAN USUAL: 0
SUM OF ALL RESPONSES TO PHQ9 QUESTIONS 1 & 2: 2
SUM OF ALL RESPONSES TO PHQ QUESTIONS 1-9: 14
7. TROUBLE CONCENTRATING ON THINGS, SUCH AS READING THE NEWSPAPER OR WATCHING TELEVISION: 0
5. POOR APPETITE OR OVEREATING: 2
2. FEELING DOWN, DEPRESSED OR HOPELESS: 1
9. THOUGHTS THAT YOU WOULD BE BETTER OFF DEAD, OR OF HURTING YOURSELF: 1
10. IF YOU CHECKED OFF ANY PROBLEMS, HOW DIFFICULT HAVE THESE PROBLEMS MADE IT FOR YOU TO DO YOUR WORK, TAKE CARE OF THINGS AT HOME, OR GET ALONG WITH OTHER PEOPLE: 1
4. FEELING TIRED OR HAVING LITTLE ENERGY: 3
SUM OF ALL RESPONSES TO PHQ QUESTIONS 1-9: 14

## 2018-08-20 NOTE — PROGRESS NOTES
Behavioral Health Consultation  Diego Navarro, Ph.D.  Psychologist  8/20/2018  1:56 PM      Time spent with Patient: 30 minutes  This is patient's first [facility] appointment. Reason for Consult:    Chief Complaint   Patient presents with    Anxiety     Referring Provider: MD Abdirizak [address]  [place], [address]    Pt provided informed consent for the behavioral health program. Discussed with patient model of service to include the limits of confidentiality (i.e. abuse reporting, suicide  intervention, etc.) and short-term intervention focused approach. Pt indicated understanding. Feedback given to PCP. S:  Pt seen per PCP and Dr. César Gooden re: anxiety. See Dr. Kelby Winter notes for more information. Pt reported symptoms of anxiety, including anxious, racing, uncontrollable thoughts, muscle tension, restlessness, insomnia, fatigue, irritability, and poor concentration/focus. Pt also reported low appetite, eats only one meal/day (often at 1-2am), knows this is why she is obese. Feels like she \"uses up all of me at work and there's nothing left. .. I constantly feel like I'm failing everyone. \" Reported some repetitive bxs, like only listening to the same song or eating the same foods over and over for days. Recently moved into her mom's condo w  and 2yo son, renovating. Has 4 stepkids (26, 21, 16, 16), 3 grand kids, 2 teenagers stay w them summers and some weekends.  is 10 yrs older than her.      O:  MSE:    Appearance    alert, cooperative  Impulsive behavior No  Speech    spontaneous, normal rate, normal volume and well articulated  Mood    Anxious  Affect    anxiety  Thought Content    intact, cognitive distortions and all or nothing thinking  Thought Process    goal directed and coherent  Associations    logical connections  Insight    Fair  Judgment    Intact  Orientation    oriented to person, place, time, and general circumstances  Memory    recent and remote memory intact  Attention/Concentration    impaired  Morbid ideation No  Suicide Assessment    no suicidal ideation    History:    Social History:   Social History     Social History    Marital status:      Spouse name: N/A    Number of children: 1    Years of education: 15     Occupational History    Not on file. Social History Main Topics    Smoking status: Current Every Day Smoker     Packs/day: 0.50     Years: 18.00     Types: Cigarettes    Smokeless tobacco: Never Used      Comment: states cutting down on cigarrettes; maybe 3 per day    Alcohol use 1.2 oz/week     2 Standard drinks or equivalent per week      Comment: social. 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 Childhood hx: good, grew up in [place], [place]. Father was a , 8 children in the home (6 biological siblings), pt grew up one of 4 girls. Moved to [place] age 8. She later found out he had cheated on her mom before this. Father started having an affair - a 23 yo female who moved into their home, pt was first to suspect but criticized by others. Pt moved out at age 18 b/c of this issue. Moved back in age 23 after her dad  of cancer. Later found out father had cheated many times before. Education: did very well in school, enjoyed her time in school.  since      TOBACCO:   reports that she has been smoking Cigarettes. She has a 9.00 pack-year smoking history. She has never used smokeless tobacco.  ETOH:   reports that she drinks about 1.2 oz of alcohol per week . A:  Administered PHQ-9 (see below). Patient endorses moderate symptoms of depression. Denied SI/HI.    PHQ Scores 2018 3/20/2018

## 2018-08-22 ENCOUNTER — TELEPHONE (OUTPATIENT)
Dept: INTERNAL MEDICINE CLINIC | Age: 38
End: 2018-08-22

## 2018-08-22 RX ORDER — PREDNISONE 10 MG/1
TABLET ORAL
Qty: 30 TABLET | Refills: 0 | Status: SHIPPED | OUTPATIENT
Start: 2018-08-22 | End: 2018-12-14 | Stop reason: ALTCHOICE

## 2018-08-22 RX ORDER — DIAZEPAM 5 MG/1
5 TABLET ORAL 2 TIMES DAILY PRN
Qty: 55 TABLET | Refills: 1 | Status: SHIPPED | OUTPATIENT
Start: 2018-08-22 | End: 2018-10-26 | Stop reason: SDUPTHER

## 2018-08-22 RX ORDER — GABAPENTIN 100 MG/1
100 CAPSULE ORAL 3 TIMES DAILY
Qty: 90 CAPSULE | Refills: 0 | Status: SHIPPED | OUTPATIENT
Start: 2018-08-22 | End: 2018-11-05 | Stop reason: ALTCHOICE

## 2018-08-22 RX ORDER — GABAPENTIN 100 MG/1
100 CAPSULE ORAL 3 TIMES DAILY
Qty: 90 CAPSULE | Refills: 3 | Status: SHIPPED | OUTPATIENT
Start: 2018-08-22 | End: 2018-08-22 | Stop reason: SDUPTHER

## 2018-08-22 NOTE — PROGRESS NOTES
her goals. There has been some concern regarding FROY and she is planning on getting a sleep study completed. At work she feels she is over worked and under appreciated. She tends to accommodate for others, do well in her job, and please customers, but finds herself essentially not getting any mental breaks for herself to decompress, ends up crying on her way to and from work on a daily basis as it seems to be her only time to herself. Will then be irritable at home. Pt is questioning whether she can work in her job as a  long term. ROS: no headaches, no vision problems, dysuria, abd pain, chest pain or SOB. Brief Medical Hx:   Pseudotumor cerebri, Hypothyroidism, morbid obesity, hidrandenitis suppurativa    Brief Psych Hx:  Med trials: lexapro, celexa, paxil (could not tolerate any of these, nausea), duloxetine (did not tolerate). Did take sertraline, not sure how long or what dose, but she did not have side effects. Been on diazepam 5mg BID for over 10 yrs  NSSI: did cut self 4 times in late teens, but denies intent to end life    Current Outpatient Prescriptions   Medication Sig Dispense Refill    acetaZOLAMIDE (DIAMOX) 250 MG tablet Take 1 tablet by mouth 2 times daily 60 tablet 1    levothyroxine (SYNTHROID) 125 MCG tablet TAKE 1 TABLET BY MOUTH DAILY 30 tablet 11    spironolactone (ALDACTONE) 25 MG tablet TAKE 1 TABLET BY MOUTH DAILY 30 tablet 11    sertraline (ZOLOFT) 50 MG tablet TAKE 3 TABLETS BY MOUTH DAILY 90 tablet 1    valACYclovir (VALTREX) 500 MG tablet TAKE 1 TABLET BY MOUTH TWICE DAILY 60 tablet 1    clindamycin (CLEOCIN T) 1 % external solution Apply to affected areas twice daily.  60 mL 4    acetaminophen (APAP EXTRA STRENGTH) 500 MG tablet Take 2 tablets by mouth every 6 hours as needed for Pain 120 tablet 3    predniSONE (DELTASONE) 10 MG tablet Take 4 tablets daily for 3 days, then 3 tablets daily for 3 days, then 2 tablets daily for 3 days, then 1 tablet

## 2018-10-01 ENCOUNTER — OFFICE VISIT (OUTPATIENT)
Dept: PSYCHOLOGY | Age: 38
End: 2018-10-01
Payer: COMMERCIAL

## 2018-10-01 DIAGNOSIS — F43.21 ADJUSTMENT DISORDER WITH DEPRESSED MOOD: ICD-10-CM

## 2018-10-01 DIAGNOSIS — F41.1 GAD (GENERALIZED ANXIETY DISORDER): Primary | ICD-10-CM

## 2018-10-01 PROCEDURE — 90832 PSYTX W PT 30 MINUTES: CPT | Performed by: PSYCHOLOGIST

## 2018-10-01 NOTE — PROGRESS NOTES
Behavioral Health Consultation  Chloé Sinclair, Ph.D.  Psychologist  10/1/2018  3:26 PM      Time spent with Patient: 30 minutes  This is patient's second  [facility] appointment. Reason for Consult:    Chief Complaint   Patient presents with    Anxiety     Referring Provider: MD ABDIFATAH Horne [facility], [address]    Feedback given to PCP. S:  Pt seen for f/u of anxiety. Reported anxiety is unchanged. Recently learned a trusted coworker has stolen over $1000 from the company, so she is currently trying to help the investigation, feeling betrayed. Considering leaving d/t burnout and recognizing need for self-care, but struggles w uncertainty. Discussed creating a timeline. O:  MSE:    Appearance    alert, cooperative  Appetite normal  Sleep disturbance No  Fatigue Yes  Loss of pleasure Yes  Impulsive behavior No  Speech    spontaneous, normal rate, normal volume and well articulated  Mood    Anxious  Affect    normal affect  Thought Content    intact  Thought Process    goal directed and coherent  Associations    logical connections  Insight    Good  Judgment    Intact  Orientation    oriented to person, place, time, and general circumstances  Memory    recent and remote memory intact  Attention/Concentration    intact  Morbid ideation No  Suicide Assessment    no suicidal ideation    History:  Social History:   Social History     Social History    Marital status:      Spouse name: N/A    Number of children: 1    Years of education: 15     Occupational History    Not on file.      Social History Main Topics    Smoking status: Current Every Day Smoker     Packs/day: 0.50     Years: 18.00     Types: Cigarettes    Smokeless tobacco: Never Used      Comment: states cutting down on cigarrettes; maybe 3 per day    Alcohol use 1.2 oz/week     2 Standard drinks or equivalent per week      Comment: social. heavier social weekend drinking in her 21's    Drug use: Yes     Types: Marijuana

## 2018-10-15 ENCOUNTER — HOSPITAL ENCOUNTER (OUTPATIENT)
Age: 38
Discharge: HOME | End: 2018-10-15
Payer: COMMERCIAL

## 2018-10-15 ENCOUNTER — OFFICE VISIT (OUTPATIENT)
Dept: NEUROLOGY | Age: 38
End: 2018-10-15
Payer: COMMERCIAL

## 2018-10-15 VITALS
SYSTOLIC BLOOD PRESSURE: 134 MMHG | HEART RATE: 80 BPM | DIASTOLIC BLOOD PRESSURE: 82 MMHG | HEIGHT: 68 IN | WEIGHT: 292 LBS | BODY MASS INDEX: 44.25 KG/M2

## 2018-10-15 DIAGNOSIS — G93.2 PSEUDOTUMOR CEREBRI: ICD-10-CM

## 2018-10-15 PROBLEM — F41.1 GAD (GENERALIZED ANXIETY DISORDER): Status: ACTIVE | Noted: 2018-10-15

## 2018-10-15 LAB
ANION GAP SERPL CALCULATED.3IONS-SCNC: 11 MMOL/L (ref 3–16)
BUN BLDV-MCNC: 11 MG/DL (ref 7–20)
CALCIUM SERPL-MCNC: 9.4 MG/DL (ref 8.3–10.6)
CHLORIDE BLD-SCNC: 110 MMOL/L (ref 99–110)
CO2: 21 MMOL/L (ref 21–32)
CREAT SERPL-MCNC: 0.9 MG/DL (ref 0.6–1.1)
GFR AFRICAN AMERICAN: >60
GFR NON-AFRICAN AMERICAN: >60
GLUCOSE BLD-MCNC: 92 MG/DL (ref 70–99)
POTASSIUM SERPL-SCNC: 4.8 MMOL/L (ref 3.5–5.1)
SODIUM BLD-SCNC: 142 MMOL/L (ref 136–145)

## 2018-10-15 PROCEDURE — G8484 FLU IMMUNIZE NO ADMIN: HCPCS | Performed by: PSYCHIATRY & NEUROLOGY

## 2018-10-15 PROCEDURE — 99214 OFFICE O/P EST MOD 30 MIN: CPT | Performed by: PSYCHIATRY & NEUROLOGY

## 2018-10-15 PROCEDURE — 80048 BASIC METABOLIC PNL TOTAL CA: CPT

## 2018-10-15 PROCEDURE — 36415 COLL VENOUS BLD VENIPUNCTURE: CPT

## 2018-10-15 PROCEDURE — G8427 DOCREV CUR MEDS BY ELIG CLIN: HCPCS | Performed by: PSYCHIATRY & NEUROLOGY

## 2018-10-15 PROCEDURE — 4004F PT TOBACCO SCREEN RCVD TLK: CPT | Performed by: PSYCHIATRY & NEUROLOGY

## 2018-10-15 PROCEDURE — G8417 CALC BMI ABV UP PARAM F/U: HCPCS | Performed by: PSYCHIATRY & NEUROLOGY

## 2018-10-15 RX ORDER — ACETAZOLAMIDE 250 MG/1
250 TABLET ORAL 2 TIMES DAILY
Qty: 60 TABLET | Refills: 1 | Status: SHIPPED | OUTPATIENT
Start: 2018-10-15 | End: 2018-12-17 | Stop reason: SDUPTHER

## 2018-10-26 DIAGNOSIS — F41.1 GENERALIZED ANXIETY DISORDER: ICD-10-CM

## 2018-10-26 RX ORDER — DIAZEPAM 5 MG/1
5 TABLET ORAL 2 TIMES DAILY PRN
Qty: 18 TABLET | Refills: 0 | Status: SHIPPED | OUTPATIENT
Start: 2018-10-27 | End: 2018-11-05 | Stop reason: SDUPTHER

## 2018-11-02 ENCOUNTER — OFFICE VISIT (OUTPATIENT)
Dept: PSYCHOLOGY | Age: 38
End: 2018-11-02
Payer: COMMERCIAL

## 2018-11-02 DIAGNOSIS — F43.21 ADJUSTMENT DISORDER WITH DEPRESSED MOOD: ICD-10-CM

## 2018-11-02 DIAGNOSIS — F41.1 GAD (GENERALIZED ANXIETY DISORDER): Primary | ICD-10-CM

## 2018-11-02 PROCEDURE — 90832 PSYTX W PT 30 MINUTES: CPT | Performed by: PSYCHOLOGIST

## 2018-11-02 ASSESSMENT — ANXIETY QUESTIONNAIRES
4. TROUBLE RELAXING: 0-NOT AT ALL SURE
3. WORRYING TOO MUCH ABOUT DIFFERENT THINGS: 3-NEARLY EVERY DAY
GAD7 TOTAL SCORE: 12
2. NOT BEING ABLE TO STOP OR CONTROL WORRYING: 2-OVER HALF THE DAYS
7. FEELING AFRAID AS IF SOMETHING AWFUL MIGHT HAPPEN: 3-NEARLY EVERY DAY
6. BECOMING EASILY ANNOYED OR IRRITABLE: 3-NEARLY EVERY DAY
1. FEELING NERVOUS, ANXIOUS, OR ON EDGE: 1-SEVERAL DAYS
5. BEING SO RESTLESS THAT IT IS HARD TO SIT STILL: 0-NOT AT ALL SURE

## 2018-11-02 NOTE — PROGRESS NOTES
Behavioral Health Consultation  Cassandra Olson, Ph.D.  Psychologist  11/2/2018  2:09 PM      Time spent with Patient: 27 minutes  This is patient's third  [facility] appointment. Reason for Consult:    Chief Complaint   Patient presents with    Anxiety     Referring Provider: MD ABDIFATAH Klein [facility], [address] [facility]    Feedback given to PCP. S:  Pt seen for f/u of anxiety. Reported no change in mood or sxs w continued work stress. Reported she feels very relaxed at home, to the point that she only wants to sleep. Having escape fantasy of killing herself at work to prove a point of how it harmed her, but adamantly denied intent, \"I could never, never do that to my family. 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 Focused on controllables and exploring how this is not her only employment option. Focused on self-care. O:  MSE:    Appearance    alert, cooperative, crying  Appetite abnormal: low, GI distress  Sleep disturbance Yes  Fatigue Yes  Loss of pleasure Yes  Impulsive behavior No  Speech    spontaneous, normal rate, normal volume, well articulated and high productivity  Mood    Depressed  Affect    depressed affect  Thought Content    intact and cognitive distortions  Thought Process    goal directed and coherent  Associations    logical connections  Insight    Fair  Judgment    Intact  Orientation    oriented to person, place, time, and general circumstances  Memory    recent and remote memory intact  Attention/Concentration    impaired  Morbid ideation Yes, Patient noted vague and passive thoughts of wishing she was no longer alive \"to prove a point\", but adamantly denied suicidal intent or plan.   Suicide Assessment    no suicidal ideation    History:  Social History:   Social History     Social History    Marital status:      Spouse name: N/A    Number of children: 1    Years of education: 15 Occupational History    Not on file. Social History Main Topics    Smoking status: Current Every Day Smoker     Packs/day: 0.50     Years: 18.00     Types: Cigarettes    Smokeless tobacco: Never Used      Comment: states cutting down on cigarrettes; maybe 3 per day    Alcohol use 1.2 oz/week     2 Standard drinks or equivalent per week      Comment: social. 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 Father was a , 8 children in the home (6 biological siblings), pt grew up one of 4 girls. Moved to [place] age 8. She later found out he had cheated on her mom before this. Father started having an affair - a 23 yo female who moved into their home, pt was first to suspect but criticized by others. Pt moved out at age 18 b/c of this issue. Moved back in age 23 after her dad  of cancer. Later found out father had cheated many times before. Education: did very well in school, enjoyed her time in school.  since      TOBACCO:   reports that she has been smoking Cigarettes. She has a 9.00 pack-year smoking history. She has never used smokeless tobacco.  ETOH:   reports that she drinks about 1.2 oz of alcohol per week . A:  BHUMI 7 SCORE 2018 2018 3/20/2018 2018 2017   BHUMI-7 Total Score 12 12 11 11 6     Interpretation of BHUMI-7 score: 5-9 = mild anxiety, 10-14 = moderate anxiety, 15+ = severe anxiety. Recommend referral to behavioral health for scores 10 or greater. Diagnosis:  1. Generalized anxiety disorder  2. Adjustment disorder with depressed mood  3.  Tobacco use disorder    Plan:  Pt

## 2018-11-05 ENCOUNTER — OFFICE VISIT (OUTPATIENT)
Dept: PSYCHIATRY | Age: 38
End: 2018-11-05
Payer: COMMERCIAL

## 2018-11-05 VITALS
BODY MASS INDEX: 45.99 KG/M2 | WEIGHT: 293 LBS | SYSTOLIC BLOOD PRESSURE: 120 MMHG | DIASTOLIC BLOOD PRESSURE: 84 MMHG | HEART RATE: 60 BPM | HEIGHT: 67 IN

## 2018-11-05 DIAGNOSIS — F43.21 ADJUSTMENT DISORDER WITH DEPRESSED MOOD: Primary | ICD-10-CM

## 2018-11-05 DIAGNOSIS — F41.1 GENERALIZED ANXIETY DISORDER: ICD-10-CM

## 2018-11-05 PROCEDURE — 99214 OFFICE O/P EST MOD 30 MIN: CPT | Performed by: PSYCHIATRY & NEUROLOGY

## 2018-11-05 PROCEDURE — 90833 PSYTX W PT W E/M 30 MIN: CPT | Performed by: PSYCHIATRY & NEUROLOGY

## 2018-11-05 RX ORDER — DIAZEPAM 5 MG/1
5 TABLET ORAL 2 TIMES DAILY PRN
Qty: 55 TABLET | Refills: 1 | Status: SHIPPED | OUTPATIENT
Start: 2018-11-05 | End: 2019-01-03 | Stop reason: SDUPTHER

## 2018-11-05 RX ORDER — BUPROPION HYDROCHLORIDE 150 MG/1
150 TABLET ORAL EVERY MORNING
Qty: 30 TABLET | Refills: 1 | Status: SHIPPED | OUTPATIENT
Start: 2018-11-05 | End: 2019-01-03 | Stop reason: SDUPTHER

## 2018-12-07 ENCOUNTER — OFFICE VISIT (OUTPATIENT)
Dept: PSYCHOLOGY | Age: 38
End: 2018-12-07
Payer: COMMERCIAL

## 2018-12-07 DIAGNOSIS — F41.1 GAD (GENERALIZED ANXIETY DISORDER): Primary | ICD-10-CM

## 2018-12-07 DIAGNOSIS — F43.21 ADJUSTMENT DISORDER WITH DEPRESSED MOOD: ICD-10-CM

## 2018-12-07 PROCEDURE — 90832 PSYTX W PT 30 MINUTES: CPT | Performed by: PSYCHOLOGIST

## 2018-12-14 ENCOUNTER — OFFICE VISIT (OUTPATIENT)
Dept: INTERNAL MEDICINE CLINIC | Age: 38
End: 2018-12-14
Payer: COMMERCIAL

## 2018-12-14 VITALS
DIASTOLIC BLOOD PRESSURE: 88 MMHG | HEIGHT: 67 IN | SYSTOLIC BLOOD PRESSURE: 130 MMHG | BODY MASS INDEX: 45.99 KG/M2 | HEART RATE: 64 BPM | WEIGHT: 293 LBS

## 2018-12-14 DIAGNOSIS — F17.200 TOBACCO USE DISORDER: Chronic | ICD-10-CM

## 2018-12-14 DIAGNOSIS — E03.9 HYPOTHYROIDISM (ACQUIRED): Primary | Chronic | ICD-10-CM

## 2018-12-14 PROCEDURE — 99213 OFFICE O/P EST LOW 20 MIN: CPT | Performed by: INTERNAL MEDICINE

## 2018-12-14 PROCEDURE — G8427 DOCREV CUR MEDS BY ELIG CLIN: HCPCS | Performed by: INTERNAL MEDICINE

## 2018-12-14 PROCEDURE — 4004F PT TOBACCO SCREEN RCVD TLK: CPT | Performed by: INTERNAL MEDICINE

## 2018-12-14 PROCEDURE — G8484 FLU IMMUNIZE NO ADMIN: HCPCS | Performed by: INTERNAL MEDICINE

## 2018-12-14 PROCEDURE — G8417 CALC BMI ABV UP PARAM F/U: HCPCS | Performed by: INTERNAL MEDICINE

## 2018-12-14 NOTE — PROGRESS NOTES
SUBJECTIVE:    Kalpana Decker returns for follow up of Her hypothyroidism. She has not been having symptoms related to Her thyroid condition. She is not taking Her medications as prescribed. Side effects related to Her condition are none. Her condition severity is mild. She has had Her condition for none. OBJECTIVE:    Vitals:    12/14/18 1132   BP: 130/88   Pulse: 64       Physical Exam   Constitutional: She is oriented to person, place, and time. She appears well-developed and well-nourished. No distress. HENT:   Head: Normocephalic and atraumatic. Pulmonary/Chest: Effort normal.   Neurological: She is alert and oriented to person, place, and time. No cranial nerve deficit. Skin: She is not diaphoretic. Psychiatric: She has a normal mood and affect. Her behavior is normal. Judgment and thought content normal.   Vitals reviewed. Current Outpatient Prescriptions:     buPROPion (WELLBUTRIN XL) 150 MG extended release tablet, Take 1 tablet by mouth every morning, Disp: 30 tablet, Rfl: 1    acetaZOLAMIDE (DIAMOX) 250 MG tablet, Take 1 tablet by mouth 2 times daily, Disp: 60 tablet, Rfl: 1    sertraline (ZOLOFT) 50 MG tablet, TAKE 3 TABLETS BY MOUTH DAILY, Disp: 90 tablet, Rfl: 5    levothyroxine (SYNTHROID) 125 MCG tablet, TAKE 1 TABLET BY MOUTH DAILY, Disp: 30 tablet, Rfl: 11    spironolactone (ALDACTONE) 25 MG tablet, TAKE 1 TABLET BY MOUTH DAILY, Disp: 30 tablet, Rfl: 11    valACYclovir (VALTREX) 500 MG tablet, TAKE 1 TABLET BY MOUTH TWICE DAILY, Disp: 60 tablet, Rfl: 1    clindamycin (CLEOCIN T) 1 % external solution, Apply to affected areas twice daily. , Disp: 60 mL, Rfl: 4    acetaminophen (APAP EXTRA STRENGTH) 500 MG tablet, Take 2 tablets by mouth every 6 hours as needed for Pain, Disp: 120 tablet, Rfl: 3    Assessment/Plan:  [facility] [place] [facility] was seen today for hypothyroidism.     Diagnoses and all orders for this visit:    Hypothyroidism (acquired)  Comments:  Chronic,

## 2018-12-17 ENCOUNTER — OFFICE VISIT (OUTPATIENT)
Dept: NEUROLOGY | Age: 38
End: 2018-12-17
Payer: COMMERCIAL

## 2018-12-17 VITALS
SYSTOLIC BLOOD PRESSURE: 129 MMHG | DIASTOLIC BLOOD PRESSURE: 90 MMHG | BODY MASS INDEX: 46.52 KG/M2 | HEART RATE: 64 BPM | WEIGHT: 293 LBS

## 2018-12-17 DIAGNOSIS — G93.2 PSEUDOTUMOR CEREBRI: ICD-10-CM

## 2018-12-17 PROCEDURE — 4004F PT TOBACCO SCREEN RCVD TLK: CPT | Performed by: PSYCHIATRY & NEUROLOGY

## 2018-12-17 PROCEDURE — G8427 DOCREV CUR MEDS BY ELIG CLIN: HCPCS | Performed by: PSYCHIATRY & NEUROLOGY

## 2018-12-17 PROCEDURE — G8417 CALC BMI ABV UP PARAM F/U: HCPCS | Performed by: PSYCHIATRY & NEUROLOGY

## 2018-12-17 PROCEDURE — G8484 FLU IMMUNIZE NO ADMIN: HCPCS | Performed by: PSYCHIATRY & NEUROLOGY

## 2018-12-17 PROCEDURE — 99213 OFFICE O/P EST LOW 20 MIN: CPT | Performed by: PSYCHIATRY & NEUROLOGY

## 2018-12-17 RX ORDER — ACETAZOLAMIDE 250 MG/1
250 TABLET ORAL 2 TIMES DAILY
Qty: 60 TABLET | Refills: 5 | Status: SHIPPED | OUTPATIENT
Start: 2018-12-17 | End: 2019-12-20 | Stop reason: SDUPTHER

## 2018-12-17 NOTE — PROGRESS NOTES
Amanda Quarles   Neurology followup    Subjective:   CC/HP  History was obtained from the patient and her Family. Interval history:  Patient has known pseudotumor cerebri. Patient states that she is doing better. Her headaches have improved. No side effects of Diamox. No significant visual complaints. Detailed history:  Patient has had visual symptoms and headaches. Patient had a lumbar puncture and was found to have significant elevated opening pressure at 39 cm of CSF. After the lumbar puncture, patient had significant post spinal headaches but now is slowly recovering. Patient had a blood patch as well. Patient had briefly been on minocycline. No other focal neurological symptoms.   Patient had an MRI brain as well as MR venogram    REVIEW OF SYSTEMS    Constitutional:  []   Chills   [x]  Fatigue   []  Fevers   []  Malaise   []  Weight loss     [] Denies all of the above    Respiratory:   []  Cough    []  Shortness of breath         [x] Denies all of the above     Cardiovascular:   []  Chest pain    []  Exertional chest pressure/discomfort           [] Palpitations    []  Syncope     [x] Denies all of the above        Past Medical History:   Diagnosis Date     History  of genital herpes     Allergic rhinitis due to pollen 1/26/2012    Anxiety     Carbuncle of breast 12/22/2015    Carpal tunnel syndrome of right wrist 2/15/2016    Cervical dysplasia     LEEP X2 THEN CRYOSURGERY    Contact lens/glasses fitting     Depression     Depressive disorder, not elsewhere classified 1/26/2012    Ganglion cyst 10/14/2015    Hidradenitis suppurativa 7/29/2016    Hypothyroid     taking synthroid 50mcg    Postpartum hypertension 2/26/2014    only during incident with kidney failure--currently not medicated for  HTN    Vulval hidradenitis suppurativa 11/19/2015     Family History   Problem Relation Age of Onset    High Blood Pressure Father     Cancer Father         pancreatic    Diabetes Father     Alcohol Abuse Father     Mental Illness Father         \"temper problems\"    Diabetes Mother     Hypertension Maternal Grandfather     Cancer Paternal Grandmother         pancreatic    Eclampsia Sister     Mental Illness Other         mother's side. details unclear    Rheum Arthritis Neg Hx     Osteoarthritis Neg Hx     Asthma Neg Hx     Breast Cancer Neg Hx     Heart Failure Neg Hx     High Cholesterol Neg Hx     Migraines Neg Hx     Ovarian Cancer Neg Hx     Rashes/Skin Problems Neg Hx     Seizures Neg Hx     Stroke Neg Hx     Thyroid Disease Neg Hx      Social History     Social History    Marital status:      Spouse name: N/A    Number of children: 1    Years of education: 14     Social History Main Topics    Smoking status: Current Every Day Smoker     Packs/day: 0.50     Years: 18.00     Types: Cigarettes    Smokeless tobacco: Never Used      Comment: states cutting down on cigarrettes; maybe 3 per day    Alcohol use 1.2 oz/week     2 Standard drinks or equivalent per week      Comment: social. 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 Father was a , 8 children in the home (6 biological siblings), pt grew up one of 4 girls. Moved to [place] age 8. She later found out he had cheated on her mom before this. Father started having an affair - a 25 yo female who moved into their home, pt was first to suspect but criticized by others. Pt moved out at age 18 b/c of this issue. Moved back in age 23 after her dad  of cancer. Later found out father had cheated many times before.     Education: did very well in school, enjoyed her time in school.  since 2003        Objective:  Exam:  BP (!) 129/90   Pulse 64   Wt 297 lb (134.7 kg)   BMI 46.52 kg/m²   This is an obese patient in no acute distress  Patient is awake, alert and oriented x3. Speech is normal.  Pupils are equal round reacting to light. Bilateral  papilledema present . Extraocular movements intact. Face symmetrical. Tongue midline. Motor exam shows normal symmetrical strength. Deep tendon reflexes normal. Plantar reflexes downgoing. Sensory exam normal. Coordination normal. Gait normal. No carotid bruit. No neck stiffness. Data :  LABS:  General Labs:    CBC:   Lab Results   Component Value Date    WBC 6.6 06/26/2017    RBC 4.73 06/26/2017    HGB 13.9 06/26/2017    HCT 41.8 06/26/2017    MCV 88.4 06/26/2017    MCH 29.3 06/26/2017    MCHC 33.2 06/26/2017    RDW 14.0 06/26/2017     06/26/2017    MPV 9.9 06/26/2017     BMP:    Lab Results   Component Value Date     10/15/2018    K 4.8 10/15/2018     10/15/2018    CO2 21 10/15/2018    BUN 11 10/15/2018    LABALBU 4.2 07/21/2017    CREATININE 0.9 10/15/2018    CALCIUM 9.4 10/15/2018    GFRAA >60 10/15/2018    GFRAA >60 09/19/2012    LABGLOM >60 10/15/2018    GLUCOSE 92 10/15/2018       Impression :  Pseudotumor cerebri  Opening pressure In the past was 39 cm of CSF  MRI brain was normal  MR venogram showed mild stenosis of the transverse sinus but no thrombus was seen  Headaches and blurred vision have improved    Plan :  Discussed with patient  Continue Diamox 250 mg twice a day  Side effects were discussed. We discussed about the need for weight loss as a potential treatment for pseudotumor cerebri  Patient will try to continue to lose weight    Please note a portion of  this chart was generated using dragon dictation software. Although every effort was made to ensure the accuracy of this automated transcription, some errors in transcription may have occurred.

## 2018-12-20 ENCOUNTER — OFFICE VISIT (OUTPATIENT)
Dept: PSYCHIATRY | Age: 38
End: 2018-12-20
Payer: COMMERCIAL

## 2018-12-20 VITALS
WEIGHT: 293 LBS | TEMPERATURE: 98.7 F | HEART RATE: 65 BPM | RESPIRATION RATE: 16 BRPM | BODY MASS INDEX: 45.99 KG/M2 | DIASTOLIC BLOOD PRESSURE: 78 MMHG | HEIGHT: 67 IN | OXYGEN SATURATION: 98 % | SYSTOLIC BLOOD PRESSURE: 128 MMHG

## 2018-12-20 DIAGNOSIS — F41.1 GAD (GENERALIZED ANXIETY DISORDER): Primary | ICD-10-CM

## 2018-12-20 PROCEDURE — 99213 OFFICE O/P EST LOW 20 MIN: CPT | Performed by: PSYCHIATRY & NEUROLOGY

## 2018-12-20 PROCEDURE — 90833 PSYTX W PT W E/M 30 MIN: CPT | Performed by: PSYCHIATRY & NEUROLOGY

## 2018-12-22 NOTE — PROGRESS NOTES
(ALDACTONE) 25 MG tablet TAKE 1 TABLET BY MOUTH DAILY 30 tablet 11    valACYclovir (VALTREX) 500 MG tablet TAKE 1 TABLET BY MOUTH TWICE DAILY 60 tablet 1    clindamycin (CLEOCIN T) 1 % external solution Apply to affected areas twice daily. 60 mL 4    acetaminophen (APAP EXTRA STRENGTH) 500 MG tablet Take 2 tablets by mouth every 6 hours as needed for Pain 120 tablet 3     No current facility-administered medications for this visit. O:  Wt Readings from Last 3 Encounters:   12/20/18 296 lb 12.8 oz (134.6 kg)   12/17/18 297 lb (134.7 kg)   12/14/18 295 lb (133.8 kg)     Temp Readings from Last 3 Encounters:   12/20/18 98.7 °F (37.1 °C)   08/03/17 97 °F (36.1 °C) (Temporal)   07/20/17 98.2 °F (36.8 °C)     BP Readings from Last 3 Encounters:   12/20/18 128/78   12/17/18 (!) 129/90   12/14/18 130/88     Pulse Readings from Last 3 Encounters:   12/20/18 65   12/17/18 64   12/14/18 64     PHQ Scores 8/20/2018 3/20/2018 1/22/2018 8/7/2017   PHQ2 Score 2 0 0 0   PHQ9 Score 14 5 5 1     Interpretation of Total Score Depression Severity: 1-4 = Minimal depression, 5-9 = Mild depression, 10-14 = Moderate depression, 15-19 = Moderately severe depression, 20-27 = Severe depression    BHUMI 7 SCORE 11/2/2018 8/17/2018 3/20/2018 1/22/2018 8/7/2017   BHUMI-7 Total Score 12 12 11 11 6     Interpretation of BHUMI-7 score: 5-9 = mild anxiety, 10-14 = moderate anxiety, 15+ = severe anxiety. Recommend referral to behavioral health for scores 10 or greater. Mental Status Exam:   Appearance    alert, cooperative  Motor: Normal strength and tone, No abnormal movements, tics or mannerisms.   Speech    spontaneous, normal rate and normal volume  Mood/Affect    better / good motility and range, full quality  Thought Process    linear, goal directed and coherent  Thought Content    intact , no suicidal ideation  Associations    logical connections  Attention/Concentration    intact  Memory    recent and remote memory

## 2019-01-03 DIAGNOSIS — F41.1 GENERALIZED ANXIETY DISORDER: ICD-10-CM

## 2019-01-03 DIAGNOSIS — F43.21 ADJUSTMENT DISORDER WITH DEPRESSED MOOD: ICD-10-CM

## 2019-01-03 RX ORDER — BUPROPION HYDROCHLORIDE 150 MG/1
150 TABLET ORAL EVERY MORNING
Qty: 30 TABLET | Refills: 3 | Status: SHIPPED | OUTPATIENT
Start: 2019-01-03 | End: 2019-05-28 | Stop reason: SDUPTHER

## 2019-01-03 RX ORDER — DIAZEPAM 5 MG/1
TABLET ORAL
Qty: 55 TABLET | Refills: 2 | Status: SHIPPED | OUTPATIENT
Start: 2019-01-03 | End: 2019-04-03

## 2019-01-11 ENCOUNTER — OFFICE VISIT (OUTPATIENT)
Dept: PSYCHOLOGY | Age: 39
End: 2019-01-11
Payer: COMMERCIAL

## 2019-01-11 DIAGNOSIS — F41.1 GAD (GENERALIZED ANXIETY DISORDER): Primary | ICD-10-CM

## 2019-01-11 PROCEDURE — 90832 PSYTX W PT 30 MINUTES: CPT | Performed by: PSYCHOLOGIST

## 2019-04-08 ENCOUNTER — TELEPHONE (OUTPATIENT)
Dept: INTERNAL MEDICINE CLINIC | Age: 39
End: 2019-04-08

## 2019-04-08 ENCOUNTER — PATIENT MESSAGE (OUTPATIENT)
Dept: PSYCHIATRY | Age: 39
End: 2019-04-08

## 2019-04-08 DIAGNOSIS — F41.1 GENERALIZED ANXIETY DISORDER: Primary | ICD-10-CM

## 2019-04-08 DIAGNOSIS — F41.1 GENERALIZED ANXIETY DISORDER: ICD-10-CM

## 2019-04-08 RX ORDER — DIAZEPAM 5 MG/1
5 TABLET ORAL 2 TIMES DAILY PRN
Qty: 55 TABLET | Refills: 0 | Status: SHIPPED | OUTPATIENT
Start: 2019-04-08 | End: 2019-05-08

## 2019-04-08 NOTE — TELEPHONE ENCOUNTER
From: Malka Merida  To: Layne Ascencio MD  Sent: 4/8/2019 2:13 PM EDT  Subject: Prescription Question    Dr AVILA,    I called in this morning to make an appointment, i had to leave message due to call volume but I'm sure I'll be scheduled by end of day or early tomorrow. I called the pharmacy for a refill of diazepam, they asked me to call you, I told them THEY needed to call you but wanted to give you a heads up. It is coming from a [facility] closer to me now, the [address] [place] [address] location phone number [phone]. I was hoping to get it today if possible. Thank you, I'll see you soon.    Dillon Cabrera

## 2019-04-08 NOTE — TELEPHONE ENCOUNTER
Phone call from [facility].  They are requesting a refill for patient on her Diazepam.

## 2019-05-03 ENCOUNTER — OFFICE VISIT (OUTPATIENT)
Dept: PSYCHOLOGY | Age: 39
End: 2019-05-03
Payer: COMMERCIAL

## 2019-05-03 DIAGNOSIS — F41.1 GAD (GENERALIZED ANXIETY DISORDER): Primary | ICD-10-CM

## 2019-05-03 PROCEDURE — 90832 PSYTX W PT 30 MINUTES: CPT | Performed by: PSYCHOLOGIST

## 2019-05-03 NOTE — PROGRESS NOTES
Behavioral Health Consultation  Shakila Fish, Ph.D.  Psychologist  5/3/2019  1:27 PM      Time spent with Patient: 27 minutes  This is patient's sixth  [facility] appointment. Reason for Consult:    Chief Complaint   Patient presents with    Anxiety     Feedback given to PCP. S:  Pt seen for f/u of anxiety. Reported mood and sxs are generally well-controlled and wants to step down her zoloft d/t weight gain and fatigue. Valium rx lasts her a month, reported she needs it \"for when I'm being irrational.\" Has been walking w sister 3x/wk, notices improvement in energy the next day. 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 Loves this role and reported it is low stress. Has been journaling in her email to herself. This has previously help her organize her thoughts, franci processing anxiety.      Goals:  Wants to start cooking dinner for her family  Writing (needs to purchase laptop)    O:  MSE:    Appearance    alert, cooperative  Appetite abnormal: high  Sleep disturbance Yes  Fatigue Yes  Loss of pleasure No  Impulsive behavior Yes  Speech    spontaneous, normal rate, normal volume and well articulated  Mood    Anxious  Affect    anxiety  Thought Content    intact and cognitive distortions  Thought Process    goal directed, coherent and tangential  Associations    logical connections, tangential connections  Insight    Fair  Judgment    fair  Orientation    oriented to person, place, time, and general circumstances  Memory    recent and remote memory intact  Attention/Concentration    impaired  Morbid ideation No  Suicide Assessment    no suicidal ideation    History:  Social History:   Social History     Socioeconomic History    Marital status:      Spouse name: Not on file    Number of children: 1    Years of education: 15  Highest education level: Not on file   Occupational History    Not on file   Social Needs    Financial resource strain: Not on file    Food insecurity:     Worry: Not on file     Inability: Not on file    Transportation needs:     Medical: Not on file     Non-medical: Not on file   Tobacco Use    Smoking status: Current Every Day Smoker     Packs/day: 0.50     Years: 18.00     Pack years: 9.00     Types: Cigarettes    Smokeless tobacco: Never Used    Tobacco comment: states cutting down on cigarrettes; maybe 3 per day   Substance and Sexual Activity    Alcohol use: Yes     Alcohol/week: 1.2 oz     Types: 2 Standard drinks or equivalent per week     Comment: social. heavier social weekend drinking in her 21's    Drug use: Yes     Types: Marijuana     Comment: occasional use    Sexual activity: Yes     Partners: Male   Lifestyle    Physical activity:     Days per week: Not on file     Minutes per session: Not on file    Stress: Not on file   Relationships    Social connections:     Talks on phone: Not on file     Gets together: Not on file     Attends Anglican service: Not on file     Active member of club or organization: Not on file     Attends meetings of clubs or organizations: Not on file     Relationship status: Not on file    Intimate partner violence:     Fear of current or ex partner: Not on file     Emotionally abused: Not on file     Physically abused: Not on file     Forced sexual activity: Not on file   Other Topics Concern    Not on file   Social History Narrative    Occupation: Has always worked in the [employer]. 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 Childhood hx: good, grew up in [place], [place]. Father was a , 8 children in the home (6 biological siblings), pt grew up one of 4 girls. Moved to [place] age 8. She later found out he had cheated on her mom before this.  Father started having an affair - a 25 yo female who moved into their home, pt was first to suspect but criticized by others. Pt moved out at age 18 b/c of this issue. Moved back in age 23 after her dad  of cancer. Later found out father had cheated many times before. Education: did very well in school, enjoyed her time in school.  since      TOBACCO:   reports that she has been smoking cigarettes. She has a 9.00 pack-year smoking history. She has never used smokeless tobacco.  ETOH:   reports that she drinks about 1.2 oz of alcohol per week. Diagnosis:  1. Generalized anxiety disorder  2. Adjustment disorder with depressed mood  3. Tobacco use disorder    Plan:  Pt interventions:  Supportive techniques and Identified relevant behavioral strategies for targeting anxiety including journaling        Documentation was done using voice recognition dragon software. Every effort was made to ensure accuracy; however, inadvertent, unintentional computerized transcription errors may be present.

## 2019-05-13 ENCOUNTER — PATIENT MESSAGE (OUTPATIENT)
Dept: PSYCHIATRY | Age: 39
End: 2019-05-13

## 2019-05-13 DIAGNOSIS — F41.1 GENERALIZED ANXIETY DISORDER: Primary | ICD-10-CM

## 2019-05-13 RX ORDER — DIAZEPAM 5 MG/1
5 TABLET ORAL 2 TIMES DAILY PRN
Qty: 55 TABLET | Refills: 0 | Status: SHIPPED | OUTPATIENT
Start: 2019-05-13 | End: 2019-06-12

## 2019-05-13 NOTE — TELEPHONE ENCOUNTER
From: Tamika Bernard  To: Samantha Hogue MD  Sent: 5/13/2019 10:38 AM EDT  Subject: Prescription Question    Dr. Usama Lang sir. I have an appointment coming up with you on May28th, but I need refills for both diazepam and Sertraline sent to the [facility] here in [facility] on [address] if you could? I look forward to seeing you soon, my visit with/ Doc Kristian Thomas went pretty well.    Thank you,   Quiana Romano

## 2019-05-28 ENCOUNTER — OFFICE VISIT (OUTPATIENT)
Dept: PSYCHIATRY | Age: 39
End: 2019-05-28
Payer: COMMERCIAL

## 2019-05-28 VITALS
BODY MASS INDEX: 44.41 KG/M2 | HEIGHT: 68 IN | SYSTOLIC BLOOD PRESSURE: 118 MMHG | HEART RATE: 66 BPM | DIASTOLIC BLOOD PRESSURE: 70 MMHG | OXYGEN SATURATION: 98 % | WEIGHT: 293 LBS

## 2019-05-28 DIAGNOSIS — F41.1 GENERALIZED ANXIETY DISORDER: ICD-10-CM

## 2019-05-28 DIAGNOSIS — F17.200 TOBACCO USE DISORDER: Primary | Chronic | ICD-10-CM

## 2019-05-28 PROCEDURE — 99214 OFFICE O/P EST MOD 30 MIN: CPT | Performed by: PSYCHIATRY & NEUROLOGY

## 2019-05-28 RX ORDER — BUPROPION HYDROCHLORIDE 150 MG/1
150 TABLET ORAL EVERY MORNING
Qty: 30 TABLET | Refills: 3 | Status: SHIPPED | OUTPATIENT
Start: 2019-05-28 | End: 2019-11-14 | Stop reason: SDUPTHER

## 2019-05-28 ASSESSMENT — PATIENT HEALTH QUESTIONNAIRE - PHQ9
6. FEELING BAD ABOUT YOURSELF - OR THAT YOU ARE A FAILURE OR HAVE LET YOURSELF OR YOUR FAMILY DOWN: 1
10. IF YOU CHECKED OFF ANY PROBLEMS, HOW DIFFICULT HAVE THESE PROBLEMS MADE IT FOR YOU TO DO YOUR WORK, TAKE CARE OF THINGS AT HOME, OR GET ALONG WITH OTHER PEOPLE: 1
2. FEELING DOWN, DEPRESSED OR HOPELESS: 0
SUM OF ALL RESPONSES TO PHQ QUESTIONS 1-9: 10
7. TROUBLE CONCENTRATING ON THINGS, SUCH AS READING THE NEWSPAPER OR WATCHING TELEVISION: 0
9. THOUGHTS THAT YOU WOULD BE BETTER OFF DEAD, OR OF HURTING YOURSELF: 2
3. TROUBLE FALLING OR STAYING ASLEEP: 3
8. MOVING OR SPEAKING SO SLOWLY THAT OTHER PEOPLE COULD HAVE NOTICED. OR THE OPPOSITE, BEING SO FIGETY OR RESTLESS THAT YOU HAVE BEEN MOVING AROUND A LOT MORE THAN USUAL: 0
1. LITTLE INTEREST OR PLEASURE IN DOING THINGS: 2
SUM OF ALL RESPONSES TO PHQ QUESTIONS 1-9: 10
SUM OF ALL RESPONSES TO PHQ9 QUESTIONS 1 & 2: 2
4. FEELING TIRED OR HAVING LITTLE ENERGY: 1
5. POOR APPETITE OR OVEREATING: 1

## 2019-05-28 ASSESSMENT — ANXIETY QUESTIONNAIRES
7. FEELING AFRAID AS IF SOMETHING AWFUL MIGHT HAPPEN: 1-SEVERAL DAYS
GAD7 TOTAL SCORE: 11
4. TROUBLE RELAXING: 0-NOT AT ALL SURE
1. FEELING NERVOUS, ANXIOUS, OR ON EDGE: 2-OVER HALF THE DAYS
3. WORRYING TOO MUCH ABOUT DIFFERENT THINGS: 3-NEARLY EVERY DAY
5. BEING SO RESTLESS THAT IT IS HARD TO SIT STILL: 0-NOT AT ALL SURE
2. NOT BEING ABLE TO STOP OR CONTROL WORRYING: 3-NEARLY EVERY DAY
6. BECOMING EASILY ANNOYED OR IRRITABLE: 2-OVER HALF THE DAYS

## 2019-05-28 NOTE — PROGRESS NOTES
PSYCHIATRY PROGRESS NOTE    Archana Ram  1980  05/28/2019  Face to Face time: 25 min  PCP: Kirsten Orozco MD      ASSESSMENT:   29 yo F who struggles with poor frustration tolerance and anxiety. Better now in the right type of work environment. The sertraline seems to have caused dulling side effects and weight gain despite having some efficacy. Willing to see how she does off of it, knowing that what she really needs is ongoing psychotherapy and maybe some DBT skills when she is able to engage in the level of care. Wellbutrin may be a option if depression seems to creep back in off the sertraline. im ok with diazepam for now, but it might be impeding any work/efforts being attempted in psychotherapy and I hope she can work to using it less often.      1. Generalized anxiety disorder  2. Tobacco use disorder  3. Pseudotumor cerebri, Hypothyroidism, morbid obesity, hidrandenitis suppurativa  4. Recent health issues, occupational stressors, financial stressors     PLAN:   1. Continue diazepam 5mg BID, 55 tabs/month  2. Taper sertraline to 125mg daily x 1 week, 100mg x 1 week, 75mg x 1 wk, 50mg x 1wk, 25mg x 1 wk then stop  3. Continue bupropion XL 150mg daily  4. Continue f/u with Dr. Jordan Singleton      >50% of the visit was spent on counseling and education     Medication Monitoring:    - OARRS reviewed, no issues noted      Follow-up: RTC in 6 weeks  Safety: RF include anxiety, hx of self harm. Pt is low risk for future dangerousness to self or others at this time   _________________________________    CC:   Chief Complaint   Patient presents with    Depression    Anxiety     S: Last seen in December. Since last visit, pt was working in her new job but found it increasingly stressful. 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 The change has been positive - she works largely at a desk with a small number of other people.  She has potential to move up in the next 3-4 yrs to a higher position. Her hours are good and her time is well respected. She feels overall it is good. Still feels reliant on diazepam to calm her down when her emotions can get intense. She feels she is getting side effects from sertraline and wants to stop it now. She does feel it has helped her, however she has continued to have weight gain, feels that her \"edginess\" is gone - in a sense she is more dulled out on the medication and feels her drive and ego-syntonic obsessiveness about cleaning and organizing have been dumbed down to an uncomfortable levels. She has done poorly on several past medications and her nini in another med trial is low. The wellbutrin has really helped her cut back on smoking, went from 1/2 ppd down to only a couple cigarrettes a day, sometimes none at all. She'd like to stay on this. She didn't feel it counteracted the dulling sensation from the sertraline. Gets pretty back w/d sx from the sertraline if she stops it for just 1 day. Feels overall things are going well in her life, but still can be subjected to depression and mood swings, a lot of fears regarding her future, occasionally feelings of being better off dead without any plan or intent to act on these. These are often triggered by situational stressors. ROS: no headaches, no vision problems, dysuria, abd pain, chest pain or SOB. +weight gain., +left ankle injury. Brief Medical Hx:   Pseudotumor cerebri, Hypothyroidism, morbid obesity, hidrandenitis suppurativa    Brief Psych Hx:  Med trials: lexapro, celexa, paxil (could not tolerate any of these, nausea), duloxetine (did not tolerate). Did take sertraline, not sure how long or what dose, but she did not have side effects.  Been on diazepam 5mg BID for over 10 yrs  NSSI: did cut self 4 times in late teens, but denies intent to end life    Current Outpatient Medications   Medication Sig Dispense Refill    buPROPion (WELLBUTRIN XL) 150 MG extended release tablet Take 1 tablet by mouth every morning 30 tablet 3    sertraline (ZOLOFT) 50 MG tablet Take 3 tablets by mouth daily 90 tablet 5    diazepam (VALIUM) 5 MG tablet Take 1 tablet by mouth 2 times daily as needed for Anxiety for up to 30 days. 55 tablet 0    acetaZOLAMIDE (DIAMOX) 250 MG tablet Take 1 tablet by mouth 2 times daily 60 tablet 5    levothyroxine (SYNTHROID) 125 MCG tablet TAKE 1 TABLET BY MOUTH DAILY 30 tablet 11    spironolactone (ALDACTONE) 25 MG tablet TAKE 1 TABLET BY MOUTH DAILY 30 tablet 11    valACYclovir (VALTREX) 500 MG tablet TAKE 1 TABLET BY MOUTH TWICE DAILY 60 tablet 1    clindamycin (CLEOCIN T) 1 % external solution Apply to affected areas twice daily. 60 mL 4    acetaminophen (APAP EXTRA STRENGTH) 500 MG tablet Take 2 tablets by mouth every 6 hours as needed for Pain 120 tablet 3     No current facility-administered medications for this visit. O:  Wt Readings from Last 3 Encounters:   05/28/19 (!) 305 lb (138.3 kg)   12/20/18 296 lb 12.8 oz (134.6 kg)   12/17/18 297 lb (134.7 kg)     Temp Readings from Last 3 Encounters:   12/20/18 98.7 °F (37.1 °C)   08/03/17 97 °F (36.1 °C) (Temporal)   07/20/17 98.2 °F (36.8 °C)     BP Readings from Last 3 Encounters:   05/28/19 118/70   12/20/18 128/78   12/17/18 (!) 129/90     Pulse Readings from Last 3 Encounters:   05/28/19 66   12/20/18 65   12/17/18 64     PHQ Scores 5/28/2019 8/20/2018 3/20/2018 1/22/2018 8/7/2017   PHQ2 Score 2 2 0 0 0   PHQ9 Score 10 14 5 5 1     Interpretation of Total Score Depression Severity: 1-4 = Minimal depression, 5-9 = Mild depression, 10-14 = Moderate depression, 15-19 = Moderately severe depression, 20-27 = Severe depression    BHUMI 7 SCORE 5/28/2019 11/2/2018 8/17/2018 3/20/2018 1/22/2018 8/7/2017   BHUMI-7 Total Score 11 12 12 11 11 6     Interpretation of BHUMI-7 score: 5-9 = mild anxiety, 10-14 = moderate anxiety, 15+ = severe anxiety. Recommend referral to behavioral health for scores 10 or greater.     Mental Status Exam:   Appearance    alert, cooperative  Motor: Normal strength and tone, No abnormal movements, tics or mannerisms. Speech    spontaneous, normal rate and normal volume  Mood/Affect    ok / good motility and range, full quality  Thought Process    linear, goal directed and coherent  Thought Content    intact , no suicidal ideation  Associations    logical connections  Attention/Concentration    intact  Memory    recent and remote memory intact  Insight/Judgement    Good / Intact    Labs:     Hospital Outpatient Visit on 10/15/2018   Component Date Value Ref Range Status    Sodium 10/15/2018 142  136 - 145 mmol/L Final    Potassium 10/15/2018 4.8  3.5 - 5.1 mmol/L Final    Chloride 10/15/2018 110  99 - 110 mmol/L Final    CO2 10/15/2018 21  21 - 32 mmol/L Final    Anion Gap 10/15/2018 11  3 - 16 Final    Glucose 10/15/2018 92  70 - 99 mg/dL Final    BUN 10/15/2018 11  7 - 20 mg/dL Final    CREATININE 10/15/2018 0.9  0.6 - 1.1 mg/dL Final    GFR Non- 10/15/2018 >60  >60 Final    Comment: >60 mL/min/1.73m2 EGFR, calc. for ages 25 and older using the  MDRD formula (not corrected for weight), is valid for stable  renal function.  GFR  10/15/2018 >60  >60 Final    Comment: Chronic Kidney Disease: less than 60 ml/min/1.73 sq.m. Kidney Failure: less than 15 ml/min/1.73 sq.m. Results valid for patients 18 years and older.  Calcium 10/15/2018 9.4  8.3 - 10.6 mg/dL Final       EK17: Rate 77 bpm, Normal sinus rhythm. Minimal voltage criteria for LVH, may be normal variant. QTc 443ms.        Angel Pollard MD  Psychiatry

## 2019-05-28 NOTE — PATIENT INSTRUCTIONS
Week 1: take 125mg sertraline   Week 2: take 100mg sertraline  Week 3: take 75mg sertraline  Week 4: take 50mg of sertraline  Week 5: take 25mg sertraline

## 2019-06-13 DIAGNOSIS — F41.1 GAD (GENERALIZED ANXIETY DISORDER): Primary | ICD-10-CM

## 2019-06-13 RX ORDER — DIAZEPAM 5 MG/1
5 TABLET ORAL EVERY 12 HOURS PRN
COMMUNITY
End: 2019-06-13 | Stop reason: SDUPTHER

## 2019-06-13 RX ORDER — DIAZEPAM 5 MG/1
5 TABLET ORAL 2 TIMES DAILY PRN
Qty: 55 TABLET | Refills: 2 | Status: SHIPPED | OUTPATIENT
Start: 2019-06-13 | End: 2019-09-09 | Stop reason: SDUPTHER

## 2019-07-30 ENCOUNTER — TELEPHONE (OUTPATIENT)
Dept: INTERNAL MEDICINE CLINIC | Age: 39
End: 2019-07-30

## 2019-07-30 NOTE — LETTER
[facility]  [address]  Phone: [phone]  Fax: [phone]    Cosmo Thomas MD        July 31, 2019     Patient: Theo Lopez   YOB: 1980   Date of Visit: 7/30/2019       To Whom it May Concern:    Umu Sampson is in active treatment with me. Please allow her time off from work on 7/30 and 7/31 due to acute illness. was seen in my clinic on 7/30/2019. She may return to work on 8/1/2019. If you have any questions or concerns, please don't hesitate to call.     Sincerely,         Cosmo Thomas MD

## 2019-09-09 DIAGNOSIS — F41.1 GAD (GENERALIZED ANXIETY DISORDER): ICD-10-CM

## 2019-09-10 RX ORDER — DIAZEPAM 5 MG/1
5 TABLET ORAL 2 TIMES DAILY PRN
Qty: 42 TABLET | Refills: 0 | Status: SHIPPED | OUTPATIENT
Start: 2019-09-10 | End: 2019-10-01 | Stop reason: SDUPTHER

## 2019-10-01 ENCOUNTER — OFFICE VISIT (OUTPATIENT)
Dept: PSYCHIATRY | Age: 39
End: 2019-10-01
Payer: COMMERCIAL

## 2019-10-01 VITALS
SYSTOLIC BLOOD PRESSURE: 120 MMHG | DIASTOLIC BLOOD PRESSURE: 78 MMHG | BODY MASS INDEX: 48.35 KG/M2 | HEART RATE: 72 BPM | WEIGHT: 293 LBS

## 2019-10-01 DIAGNOSIS — F41.1 GAD (GENERALIZED ANXIETY DISORDER): Primary | ICD-10-CM

## 2019-10-01 DIAGNOSIS — F17.200 TOBACCO USE DISORDER: Chronic | ICD-10-CM

## 2019-10-01 DIAGNOSIS — F41.1 GENERALIZED ANXIETY DISORDER: ICD-10-CM

## 2019-10-01 PROCEDURE — 99214 OFFICE O/P EST MOD 30 MIN: CPT | Performed by: PSYCHIATRY & NEUROLOGY

## 2019-10-01 PROCEDURE — G8484 FLU IMMUNIZE NO ADMIN: HCPCS | Performed by: PSYCHIATRY & NEUROLOGY

## 2019-10-01 PROCEDURE — G8427 DOCREV CUR MEDS BY ELIG CLIN: HCPCS | Performed by: PSYCHIATRY & NEUROLOGY

## 2019-10-01 PROCEDURE — G8417 CALC BMI ABV UP PARAM F/U: HCPCS | Performed by: PSYCHIATRY & NEUROLOGY

## 2019-10-01 PROCEDURE — 4004F PT TOBACCO SCREEN RCVD TLK: CPT | Performed by: PSYCHIATRY & NEUROLOGY

## 2019-10-01 RX ORDER — DIAZEPAM 5 MG/1
5 TABLET ORAL 2 TIMES DAILY PRN
Qty: 50 TABLET | Refills: 2 | Status: SHIPPED | OUTPATIENT
Start: 2019-10-01 | End: 2019-10-31

## 2019-10-11 ENCOUNTER — OFFICE VISIT (OUTPATIENT)
Dept: PSYCHOLOGY | Age: 39
End: 2019-10-11
Payer: COMMERCIAL

## 2019-10-11 DIAGNOSIS — F41.1 GAD (GENERALIZED ANXIETY DISORDER): Primary | ICD-10-CM

## 2019-10-11 PROCEDURE — 90832 PSYTX W PT 30 MINUTES: CPT | Performed by: PSYCHOLOGIST

## 2019-10-18 ENCOUNTER — OFFICE VISIT (OUTPATIENT)
Dept: INTERNAL MEDICINE CLINIC | Age: 39
End: 2019-10-18
Payer: COMMERCIAL

## 2019-10-18 VITALS
WEIGHT: 293 LBS | DIASTOLIC BLOOD PRESSURE: 76 MMHG | BODY MASS INDEX: 44.41 KG/M2 | HEIGHT: 68 IN | SYSTOLIC BLOOD PRESSURE: 122 MMHG | HEART RATE: 72 BPM

## 2019-10-18 DIAGNOSIS — Z00.00 ROUTINE ADULT HEALTH MAINTENANCE: Primary | ICD-10-CM

## 2019-10-18 DIAGNOSIS — E66.01 MORBID OBESITY (HCC): Chronic | ICD-10-CM

## 2019-10-18 DIAGNOSIS — F41.1 GAD (GENERALIZED ANXIETY DISORDER): ICD-10-CM

## 2019-10-18 DIAGNOSIS — E03.9 HYPOTHYROIDISM (ACQUIRED): Chronic | ICD-10-CM

## 2019-10-18 DIAGNOSIS — Z13.1 SCREENING FOR DIABETES MELLITUS: ICD-10-CM

## 2019-10-18 DIAGNOSIS — F17.200 TOBACCO USE DISORDER: ICD-10-CM

## 2019-10-18 DIAGNOSIS — K27.9 PEPTIC ULCER DISEASE: ICD-10-CM

## 2019-10-18 PROCEDURE — G8484 FLU IMMUNIZE NO ADMIN: HCPCS | Performed by: INTERNAL MEDICINE

## 2019-10-18 PROCEDURE — 99395 PREV VISIT EST AGE 18-39: CPT | Performed by: INTERNAL MEDICINE

## 2019-10-18 PROCEDURE — G9016 DEMO-SMOKING CESSATION COUN: HCPCS | Performed by: INTERNAL MEDICINE

## 2019-10-18 ASSESSMENT — ENCOUNTER SYMPTOMS
SHORTNESS OF BREATH: 0
DIARRHEA: 0
BLOOD IN STOOL: 0
COUGH: 0
CONSTIPATION: 0

## 2019-10-23 DIAGNOSIS — Z00.00 ROUTINE ADULT HEALTH MAINTENANCE: ICD-10-CM

## 2019-10-23 DIAGNOSIS — Z13.1 SCREENING FOR DIABETES MELLITUS: ICD-10-CM

## 2019-10-23 DIAGNOSIS — K27.9 PEPTIC ULCER DISEASE: ICD-10-CM

## 2019-10-23 DIAGNOSIS — E03.9 HYPOTHYROIDISM (ACQUIRED): Chronic | ICD-10-CM

## 2019-10-23 LAB
A/G RATIO: 1.8 (ref 1.1–2.2)
ALBUMIN SERPL-MCNC: 4.4 G/DL (ref 3.4–5)
ALP BLD-CCNC: 66 U/L (ref 40–129)
ALT SERPL-CCNC: 11 U/L (ref 10–40)
ANION GAP SERPL CALCULATED.3IONS-SCNC: 12 MMOL/L (ref 3–16)
AST SERPL-CCNC: 11 U/L (ref 15–37)
BASOPHILS ABSOLUTE: 0.1 K/UL (ref 0–0.2)
BASOPHILS RELATIVE PERCENT: 0.8 %
BILIRUB SERPL-MCNC: <0.2 MG/DL (ref 0–1)
BUN BLDV-MCNC: 12 MG/DL (ref 7–20)
CALCIUM SERPL-MCNC: 9.6 MG/DL (ref 8.3–10.6)
CHLORIDE BLD-SCNC: 104 MMOL/L (ref 99–110)
CHOLESTEROL, TOTAL: 186 MG/DL (ref 0–199)
CO2: 25 MMOL/L (ref 21–32)
CREAT SERPL-MCNC: 0.8 MG/DL (ref 0.6–1.1)
EOSINOPHILS ABSOLUTE: 0.3 K/UL (ref 0–0.6)
EOSINOPHILS RELATIVE PERCENT: 4.3 %
GFR AFRICAN AMERICAN: >60
GFR NON-AFRICAN AMERICAN: >60
GLOBULIN: 2.4 G/DL
GLUCOSE BLD-MCNC: 96 MG/DL (ref 70–99)
HCT VFR BLD CALC: 42.7 % (ref 36–48)
HDLC SERPL-MCNC: 56 MG/DL (ref 40–60)
HEMOGLOBIN: 14.1 G/DL (ref 12–16)
LDL CHOLESTEROL CALCULATED: 111 MG/DL
LYMPHOCYTES ABSOLUTE: 1.9 K/UL (ref 1–5.1)
LYMPHOCYTES RELATIVE PERCENT: 28.6 %
MCH RBC QN AUTO: 28.9 PG (ref 26–34)
MCHC RBC AUTO-ENTMCNC: 33 G/DL (ref 31–36)
MCV RBC AUTO: 87.5 FL (ref 80–100)
MONOCYTES ABSOLUTE: 0.4 K/UL (ref 0–1.3)
MONOCYTES RELATIVE PERCENT: 6.3 %
NEUTROPHILS ABSOLUTE: 4 K/UL (ref 1.7–7.7)
NEUTROPHILS RELATIVE PERCENT: 60 %
PDW BLD-RTO: 14.1 % (ref 12.4–15.4)
PLATELET # BLD: 214 K/UL (ref 135–450)
PMV BLD AUTO: 9.6 FL (ref 5–10.5)
POTASSIUM SERPL-SCNC: 4.4 MMOL/L (ref 3.5–5.1)
RBC # BLD: 4.88 M/UL (ref 4–5.2)
SODIUM BLD-SCNC: 141 MMOL/L (ref 136–145)
T4 FREE: 1.4 NG/DL (ref 0.9–1.8)
TOTAL PROTEIN: 6.8 G/DL (ref 6.4–8.2)
TRIGL SERPL-MCNC: 95 MG/DL (ref 0–150)
TSH SERPL DL<=0.05 MIU/L-ACNC: 3.39 UIU/ML (ref 0.27–4.2)
VLDLC SERPL CALC-MCNC: 19 MG/DL
WBC # BLD: 6.7 K/UL (ref 4–11)

## 2019-10-24 LAB
ESTIMATED AVERAGE GLUCOSE: 105.4 MG/DL
HBA1C MFR BLD: 5.3 %
VARICELLA-ZOSTER VIRUS AB, IGG: NORMAL

## 2019-10-25 LAB — H PYLORI BREATH TEST: NEGATIVE

## 2019-11-14 ENCOUNTER — OFFICE VISIT (OUTPATIENT)
Dept: PSYCHIATRY | Age: 39
End: 2019-11-14
Payer: COMMERCIAL

## 2019-11-14 VITALS
WEIGHT: 293 LBS | HEART RATE: 65 BPM | DIASTOLIC BLOOD PRESSURE: 84 MMHG | BODY MASS INDEX: 49.72 KG/M2 | SYSTOLIC BLOOD PRESSURE: 128 MMHG

## 2019-11-14 DIAGNOSIS — F17.200 TOBACCO USE DISORDER: Chronic | ICD-10-CM

## 2019-11-14 DIAGNOSIS — F41.1 GENERALIZED ANXIETY DISORDER: Primary | ICD-10-CM

## 2019-11-14 DIAGNOSIS — E66.01 MORBID OBESITY (HCC): Chronic | ICD-10-CM

## 2019-11-14 PROCEDURE — 99213 OFFICE O/P EST LOW 20 MIN: CPT | Performed by: PSYCHIATRY & NEUROLOGY

## 2019-11-14 PROCEDURE — G8427 DOCREV CUR MEDS BY ELIG CLIN: HCPCS | Performed by: PSYCHIATRY & NEUROLOGY

## 2019-11-14 PROCEDURE — G8484 FLU IMMUNIZE NO ADMIN: HCPCS | Performed by: PSYCHIATRY & NEUROLOGY

## 2019-11-14 PROCEDURE — 90833 PSYTX W PT W E/M 30 MIN: CPT | Performed by: PSYCHIATRY & NEUROLOGY

## 2019-11-14 PROCEDURE — 4004F PT TOBACCO SCREEN RCVD TLK: CPT | Performed by: PSYCHIATRY & NEUROLOGY

## 2019-11-14 PROCEDURE — G8417 CALC BMI ABV UP PARAM F/U: HCPCS | Performed by: PSYCHIATRY & NEUROLOGY

## 2019-11-14 RX ORDER — SERTRALINE HYDROCHLORIDE 25 MG/1
TABLET, FILM COATED ORAL
Qty: 15 TABLET | Refills: 0 | Status: SHIPPED | OUTPATIENT
Start: 2019-11-14 | End: 2019-12-12 | Stop reason: ALTCHOICE

## 2019-11-14 RX ORDER — DIAZEPAM 5 MG/1
5 TABLET ORAL 2 TIMES DAILY PRN
Qty: 50 TABLET | Refills: 1 | Status: SHIPPED | OUTPATIENT
Start: 2019-11-30 | End: 2020-01-25 | Stop reason: SDUPTHER

## 2019-11-14 RX ORDER — FLUOXETINE HYDROCHLORIDE 20 MG/1
20 CAPSULE ORAL DAILY
Qty: 30 CAPSULE | Refills: 1 | Status: SHIPPED | OUTPATIENT
Start: 2019-11-14 | End: 2020-02-13 | Stop reason: ALTCHOICE

## 2019-11-14 RX ORDER — BUPROPION HYDROCHLORIDE 150 MG/1
150 TABLET ORAL EVERY MORNING
Qty: 30 TABLET | Refills: 3 | Status: SHIPPED | OUTPATIENT
Start: 2019-11-14 | End: 2020-02-13 | Stop reason: SDUPTHER

## 2019-11-15 ENCOUNTER — OFFICE VISIT (OUTPATIENT)
Dept: PSYCHOLOGY | Age: 39
End: 2019-11-15
Payer: COMMERCIAL

## 2019-11-15 DIAGNOSIS — F41.1 GAD (GENERALIZED ANXIETY DISORDER): Primary | ICD-10-CM

## 2019-11-15 PROCEDURE — 90832 PSYTX W PT 30 MINUTES: CPT | Performed by: PSYCHOLOGIST

## 2019-12-12 ENCOUNTER — OFFICE VISIT (OUTPATIENT)
Dept: PSYCHIATRY | Age: 39
End: 2019-12-12
Payer: COMMERCIAL

## 2019-12-12 VITALS
BODY MASS INDEX: 50.02 KG/M2 | WEIGHT: 293 LBS | DIASTOLIC BLOOD PRESSURE: 88 MMHG | HEART RATE: 69 BPM | SYSTOLIC BLOOD PRESSURE: 130 MMHG

## 2019-12-12 DIAGNOSIS — F41.1 GAD (GENERALIZED ANXIETY DISORDER): Primary | ICD-10-CM

## 2019-12-12 DIAGNOSIS — E66.01 MORBID OBESITY (HCC): Chronic | ICD-10-CM

## 2019-12-12 DIAGNOSIS — F17.200 TOBACCO USE DISORDER: Chronic | ICD-10-CM

## 2019-12-12 PROCEDURE — G8484 FLU IMMUNIZE NO ADMIN: HCPCS | Performed by: PSYCHIATRY & NEUROLOGY

## 2019-12-12 PROCEDURE — 99214 OFFICE O/P EST MOD 30 MIN: CPT | Performed by: PSYCHIATRY & NEUROLOGY

## 2019-12-12 PROCEDURE — G8417 CALC BMI ABV UP PARAM F/U: HCPCS | Performed by: PSYCHIATRY & NEUROLOGY

## 2019-12-12 PROCEDURE — G8427 DOCREV CUR MEDS BY ELIG CLIN: HCPCS | Performed by: PSYCHIATRY & NEUROLOGY

## 2019-12-12 PROCEDURE — 4004F PT TOBACCO SCREEN RCVD TLK: CPT | Performed by: PSYCHIATRY & NEUROLOGY

## 2019-12-12 ASSESSMENT — PATIENT HEALTH QUESTIONNAIRE - PHQ9
SUM OF ALL RESPONSES TO PHQ9 QUESTIONS 1 & 2: 0
3. TROUBLE FALLING OR STAYING ASLEEP: 1
4. FEELING TIRED OR HAVING LITTLE ENERGY: 1
SUM OF ALL RESPONSES TO PHQ QUESTIONS 1-9: 6
7. TROUBLE CONCENTRATING ON THINGS, SUCH AS READING THE NEWSPAPER OR WATCHING TELEVISION: 0
10. IF YOU CHECKED OFF ANY PROBLEMS, HOW DIFFICULT HAVE THESE PROBLEMS MADE IT FOR YOU TO DO YOUR WORK, TAKE CARE OF THINGS AT HOME, OR GET ALONG WITH OTHER PEOPLE: 1
6. FEELING BAD ABOUT YOURSELF - OR THAT YOU ARE A FAILURE OR HAVE LET YOURSELF OR YOUR FAMILY DOWN: 1
5. POOR APPETITE OR OVEREATING: 1
8. MOVING OR SPEAKING SO SLOWLY THAT OTHER PEOPLE COULD HAVE NOTICED. OR THE OPPOSITE, BEING SO FIGETY OR RESTLESS THAT YOU HAVE BEEN MOVING AROUND A LOT MORE THAN USUAL: 1
9. THOUGHTS THAT YOU WOULD BE BETTER OFF DEAD, OR OF HURTING YOURSELF: 1
SUM OF ALL RESPONSES TO PHQ QUESTIONS 1-9: 6
2. FEELING DOWN, DEPRESSED OR HOPELESS: 0
1. LITTLE INTEREST OR PLEASURE IN DOING THINGS: 0

## 2019-12-12 ASSESSMENT — COLUMBIA-SUICIDE SEVERITY RATING SCALE - C-SSRS
6. HAVE YOU EVER DONE ANYTHING, STARTED TO DO ANYTHING, OR PREPARED TO DO ANYTHING TO END YOUR LIFE?: NO
1. WITHIN THE PAST MONTH, HAVE YOU WISHED YOU WERE DEAD OR WISHED YOU COULD GO TO SLEEP AND NOT WAKE UP?: NO
2. HAVE YOU ACTUALLY HAD ANY THOUGHTS OF KILLING YOURSELF?: NO

## 2019-12-12 ASSESSMENT — ANXIETY QUESTIONNAIRES
GAD7 TOTAL SCORE: 8
5. BEING SO RESTLESS THAT IT IS HARD TO SIT STILL: 0-NOT AT ALL
4. TROUBLE RELAXING: 0-NOT AT ALL
7. FEELING AFRAID AS IF SOMETHING AWFUL MIGHT HAPPEN: 1-SEVERAL DAYS
2. NOT BEING ABLE TO STOP OR CONTROL WORRYING: 1-SEVERAL DAYS
6. BECOMING EASILY ANNOYED OR IRRITABLE: 2-OVER HALF THE DAYS
1. FEELING NERVOUS, ANXIOUS, OR ON EDGE: 2-OVER HALF THE DAYS
3. WORRYING TOO MUCH ABOUT DIFFERENT THINGS: 2-OVER HALF THE DAYS

## 2019-12-13 ENCOUNTER — OFFICE VISIT (OUTPATIENT)
Dept: PSYCHOLOGY | Age: 39
End: 2019-12-13
Payer: COMMERCIAL

## 2019-12-13 DIAGNOSIS — F41.1 GAD (GENERALIZED ANXIETY DISORDER): Primary | ICD-10-CM

## 2019-12-13 PROCEDURE — 90832 PSYTX W PT 30 MINUTES: CPT | Performed by: PSYCHOLOGIST

## 2019-12-20 ENCOUNTER — OFFICE VISIT (OUTPATIENT)
Dept: NEUROLOGY | Age: 39
End: 2019-12-20
Payer: COMMERCIAL

## 2019-12-20 VITALS
SYSTOLIC BLOOD PRESSURE: 133 MMHG | BODY MASS INDEX: 44.41 KG/M2 | DIASTOLIC BLOOD PRESSURE: 89 MMHG | HEART RATE: 68 BPM | HEIGHT: 68 IN | WEIGHT: 293 LBS

## 2019-12-20 DIAGNOSIS — H47.11 BILATERAL PAPILLEDEMA DUE TO RAISED INTRACRANIAL PRESSURE: ICD-10-CM

## 2019-12-20 DIAGNOSIS — G93.2 PSEUDOTUMOR CEREBRI: Primary | ICD-10-CM

## 2019-12-20 PROCEDURE — 99214 OFFICE O/P EST MOD 30 MIN: CPT | Performed by: PSYCHIATRY & NEUROLOGY

## 2019-12-20 PROCEDURE — G8484 FLU IMMUNIZE NO ADMIN: HCPCS | Performed by: PSYCHIATRY & NEUROLOGY

## 2019-12-20 PROCEDURE — G8417 CALC BMI ABV UP PARAM F/U: HCPCS | Performed by: PSYCHIATRY & NEUROLOGY

## 2019-12-20 PROCEDURE — 4004F PT TOBACCO SCREEN RCVD TLK: CPT | Performed by: PSYCHIATRY & NEUROLOGY

## 2019-12-20 PROCEDURE — G8427 DOCREV CUR MEDS BY ELIG CLIN: HCPCS | Performed by: PSYCHIATRY & NEUROLOGY

## 2019-12-20 RX ORDER — ACETAZOLAMIDE 250 MG/1
250 TABLET ORAL 2 TIMES DAILY
Qty: 60 TABLET | Refills: 1 | Status: SHIPPED | OUTPATIENT
Start: 2019-12-20 | End: 2020-02-26

## 2019-12-23 ENCOUNTER — OFFICE VISIT (OUTPATIENT)
Dept: INTERNAL MEDICINE CLINIC | Age: 39
End: 2019-12-23
Payer: COMMERCIAL

## 2019-12-23 VITALS
DIASTOLIC BLOOD PRESSURE: 76 MMHG | WEIGHT: 293 LBS | SYSTOLIC BLOOD PRESSURE: 124 MMHG | HEART RATE: 74 BPM | BODY MASS INDEX: 48.05 KG/M2

## 2019-12-23 DIAGNOSIS — J40 BRONCHITIS: Primary | ICD-10-CM

## 2019-12-23 DIAGNOSIS — J01.00 ACUTE NON-RECURRENT MAXILLARY SINUSITIS: ICD-10-CM

## 2019-12-23 PROCEDURE — G8427 DOCREV CUR MEDS BY ELIG CLIN: HCPCS | Performed by: INTERNAL MEDICINE

## 2019-12-23 PROCEDURE — 4004F PT TOBACCO SCREEN RCVD TLK: CPT | Performed by: INTERNAL MEDICINE

## 2019-12-23 PROCEDURE — 99213 OFFICE O/P EST LOW 20 MIN: CPT | Performed by: INTERNAL MEDICINE

## 2019-12-23 PROCEDURE — G8484 FLU IMMUNIZE NO ADMIN: HCPCS | Performed by: INTERNAL MEDICINE

## 2019-12-23 PROCEDURE — G8417 CALC BMI ABV UP PARAM F/U: HCPCS | Performed by: INTERNAL MEDICINE

## 2019-12-23 RX ORDER — AZITHROMYCIN 250 MG/1
250 TABLET, FILM COATED ORAL SEE ADMIN INSTRUCTIONS
Qty: 6 TABLET | Refills: 0 | Status: SHIPPED | OUTPATIENT
Start: 2019-12-23 | End: 2019-12-28

## 2019-12-23 RX ORDER — DEXTROMETHORPHAN HYDROBROMIDE AND PROMETHAZINE HYDROCHLORIDE 15; 6.25 MG/5ML; MG/5ML
5 SYRUP ORAL 4 TIMES DAILY PRN
Qty: 118 ML | Refills: 0 | Status: SHIPPED | OUTPATIENT
Start: 2019-12-23 | End: 2019-12-30

## 2019-12-23 RX ORDER — ALBUTEROL SULFATE 90 UG/1
2 AEROSOL, METERED RESPIRATORY (INHALATION) EVERY 6 HOURS PRN
Qty: 1 INHALER | Refills: 3 | Status: SHIPPED | OUTPATIENT
Start: 2019-12-23 | End: 2021-05-04

## 2020-01-10 ENCOUNTER — OFFICE VISIT (OUTPATIENT)
Dept: PSYCHOLOGY | Age: 40
End: 2020-01-10
Payer: COMMERCIAL

## 2020-01-10 LAB
CONTROL: NORMAL
HCG QUALITATIVE: NEGATIVE
PREGNANCY TEST URINE, POC: NORMAL

## 2020-01-10 PROCEDURE — 90832 PSYTX W PT 30 MINUTES: CPT | Performed by: PSYCHOLOGIST

## 2020-01-10 NOTE — PROGRESS NOTES
Behavioral Health Consultation  Mohini Navarrete, Ph.D.  Psychologist  1/10/2020  1:33 PM      Time spent with Patient: 25 minutes  This is patient's tenth  [facility] appointment. Reason for Consult:    Chief Complaint   Patient presents with    Anxiety       Feedback given to PCP. S:  Pt seen for f/u of anxiety and depression. Pt reported improved mood and sxs. 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 Wants a more physically active job r/t weight gain 2/2 sedentary lifestyle. Is looking forward to this, noted they won't have the factors that contributed to her burnout at previous position. Achieved the goal to not go out of her way to eat candy bars w exception of week before her period. Assisted in obtaining pregnancy test today. Concerned r/t vague pregnancy sxs.      O:  MSE:  Appearance    alert, cooperative  Appetite abnormal: high  Sleep disturbance Yes  Fatigue Yes  Loss of pleasure No  Impulsive behavior Yes  Speech    spontaneous, normal rate, normal volume and well articulated  Mood    Anxious  Affect    anxiety  Thought Content    intact and cognitive distortions  Thought Process    goal directed, coherent and tangential  Associations    logical connections, tangential connections  Insight    Fair  Judgment    fair  Orientation    oriented to person, place, time, and general circumstances  Memory    recent and remote memory intact  Attention/Concentration    impaired  Morbid ideation No  Suicide Assessment    no suicidal ideation    History:  Social History:   Social History     Socioeconomic History    Marital status:      Spouse name: Not on file    Number of children: 1    Years of education: 15    Highest education level: Not on file   Occupational History    Not on file   Social Needs    Financial resource strain: Not on file    Food insecurity:     Worry: Not on file     Inability: Not on file    Transportation needs:     Medical: Not on file     Non-medical: Not on file   Tobacco Use    Smoking status: Current Every Day Smoker     Packs/day: 0.50     Years: 18.00     Pack years: 9.00     Types: Cigarettes    Smokeless tobacco: Never Used    Tobacco comment: states cutting down on cigarrettes; maybe 3 per day   Substance and Sexual Activity    Alcohol use: Yes     Alcohol/week: 2.0 standard drinks     Types: 2 Standard drinks or equivalent per week     Comment: social. heavier social weekend drinking in her 21's    Drug use: Yes     Types: Marijuana     Comment: occasional use    Sexual activity: Yes     Partners: Male   Lifestyle    Physical activity:     Days per week: Not on file     Minutes per session: Not on file    Stress: Not on file   Relationships    Social connections:     Talks on phone: Not on file     Gets together: Not on file     Attends Anglican service: Not on file     Active member of club or organization: Not on file     Attends meetings of clubs or organizations: Not on file     Relationship status: Not on file    Intimate partner violence:     Fear of current or ex partner: Not on file     Emotionally abused: Not on file     Physically abused: Not on file     Forced sexual activity: Not on file   Other Topics Concern    Not on file   Social History Narrative    Occupation: Has always worked in the [employer]. 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 Father was a , 8 children in the home (6 biological siblings), pt grew up one of 4 girls. Moved to [place] age 8. She later found out he had cheated on her mom before this. Father started having an affair - a 23 yo female who moved into their home, pt was first to suspect but criticized by others. Pt moved out at age 18 b/c of this issue. Moved back in age 23 after her dad  of cancer. Later found out father had cheated many times before.     Education: did very well in school, enjoyed her time in school.  since 2003     TOBACCO:   reports that she has been smoking cigarettes. She has a 9.00 pack-year smoking history. She has never used smokeless tobacco.  ETOH:   reports current alcohol use of about 2.0 standard drinks of alcohol per week. Diagnosis:  1. BHUMI (generalized anxiety disorder)          Plan:  Pt interventions:  Discussed and problem-solved barriers in adhering to behavioral change plan, Motivational Interviewing to increase patient confidence and compliance with adhering to behavioral change plan and Motivational Interviewing to determine importance and readiness for change        Documentation was done using voice recognition dragon software. Every effort was made to ensure accuracy; however, inadvertent, unintentional computerized transcription errors may be present.

## 2020-01-27 RX ORDER — DIAZEPAM 5 MG/1
5 TABLET ORAL 2 TIMES DAILY PRN
Qty: 50 TABLET | Refills: 0 | Status: SHIPPED | OUTPATIENT
Start: 2020-01-27 | End: 2020-02-13 | Stop reason: SDUPTHER

## 2020-01-31 ENCOUNTER — OFFICE VISIT (OUTPATIENT)
Dept: PSYCHOLOGY | Age: 40
End: 2020-01-31
Payer: COMMERCIAL

## 2020-01-31 PROCEDURE — 90832 PSYTX W PT 30 MINUTES: CPT | Performed by: PSYCHOLOGIST

## 2020-01-31 NOTE — PROGRESS NOTES
Behavioral Health Consultation  Karin Rasmussen, Ph.D.  Psychologist  1/31/2020  1:29 PM      Time spent with Patient: 33 minutes  This is patient's 11th  [facility] appointment. Reason for Consult:    Chief Complaint   Patient presents with    Anxiety       Feedback given to PCP. S:  Pt seen for f/u of anxiety. Pt reported stable mood and sxs. New job is going well, continues to get pressure to manage and is maintaining her boundaries. Has been much more physically active and been able to get to the gym. Continues to have significant GI distress. Encouraged f/u w PCP Elle Roberts, though pt not particularly concerned. Anxious about financial situation and angry about \"painting myself into a corner\" regarding finances and professional decisions. Discussed her commitment to not taking on a leadership role at work for several more mos and how she wants to convey this w appropriate assertiveness.      O:  MSE:  Appearance    alert, cooperative  Appetite abnormal: high  Sleep disturbance Yes  Fatigue Yes  Loss of pleasure No  Impulsive behavior Yes  Speech    spontaneous, normal rate, normal volume and well articulated  Mood    Anxious  Affect    anxiety  Thought Content    intact and cognitive distortions  Thought Process    goal directed, coherent and tangential  Associations    logical connections, tangential connections  Insight    Fair  Judgment    fair  Orientation    oriented to person, place, time, and general circumstances  Memory    recent and remote memory intact  Attention/Concentration    impaired  Morbid ideation No  Suicide Assessment    no suicidal ideation    History:  Social History:   Social History     Socioeconomic History    Marital status:      Spouse name: Not on file    Number of children: 1    Years of education: 15    Highest education level: Not on file   Occupational History    Not on file   Social Needs    Financial resource strain: Not on file    Food insecurity:     Worry: Not on

## 2020-02-13 ENCOUNTER — OFFICE VISIT (OUTPATIENT)
Dept: PSYCHIATRY | Age: 40
End: 2020-02-13
Payer: COMMERCIAL

## 2020-02-13 VITALS
BODY MASS INDEX: 49.57 KG/M2 | WEIGHT: 293 LBS | SYSTOLIC BLOOD PRESSURE: 124 MMHG | DIASTOLIC BLOOD PRESSURE: 79 MMHG | HEART RATE: 72 BPM

## 2020-02-13 PROCEDURE — 99213 OFFICE O/P EST LOW 20 MIN: CPT | Performed by: PSYCHIATRY & NEUROLOGY

## 2020-02-13 PROCEDURE — G8417 CALC BMI ABV UP PARAM F/U: HCPCS | Performed by: PSYCHIATRY & NEUROLOGY

## 2020-02-13 PROCEDURE — 90833 PSYTX W PT W E/M 30 MIN: CPT | Performed by: PSYCHIATRY & NEUROLOGY

## 2020-02-13 PROCEDURE — 4004F PT TOBACCO SCREEN RCVD TLK: CPT | Performed by: PSYCHIATRY & NEUROLOGY

## 2020-02-13 PROCEDURE — G8484 FLU IMMUNIZE NO ADMIN: HCPCS | Performed by: PSYCHIATRY & NEUROLOGY

## 2020-02-13 PROCEDURE — G8428 CUR MEDS NOT DOCUMENT: HCPCS | Performed by: PSYCHIATRY & NEUROLOGY

## 2020-02-13 RX ORDER — DIAZEPAM 5 MG/1
5 TABLET ORAL 2 TIMES DAILY PRN
Qty: 50 TABLET | Refills: 1 | Status: SHIPPED | OUTPATIENT
Start: 2020-02-26 | End: 2020-04-14 | Stop reason: SDUPTHER

## 2020-02-13 RX ORDER — BUPROPION HYDROCHLORIDE 150 MG/1
150 TABLET ORAL EVERY MORNING
Qty: 30 TABLET | Refills: 3 | Status: SHIPPED | OUTPATIENT
Start: 2020-02-13 | End: 2020-04-14 | Stop reason: SDUPTHER

## 2020-02-13 NOTE — PROGRESS NOTES
tics or mannerisms. Speech    spontaneous, normal rate and normal volume  Mood/Affect    good / full quality good motility and range  Thought Process    linear, goal directed and coherent  Thought Content    intact , future oriented, no suicidal ideation  Associations    logical connections  Attention/Concentration    intact  Memory    recent and remote memory intact  Insight/Judgement    Good / Intact    Labs:     Orders Only on 01/10/2020   Component Date Value Ref Range Status    Preg Test, Ur 01/10/2020    Final    inconclusive    hCG Qual 01/10/2020 Negative  Detects HCG level >10 MIU/mL Final       EK17: Rate 77 bpm, Normal sinus rhythm. Minimal voltage criteria for LVH, may be normal variant. QTc 443ms.        Marga Cain MD  Psychiatry

## 2020-03-04 ENCOUNTER — OFFICE VISIT (OUTPATIENT)
Dept: INTERNAL MEDICINE CLINIC | Age: 40
End: 2020-03-04
Payer: COMMERCIAL

## 2020-03-04 VITALS
OXYGEN SATURATION: 98 % | HEART RATE: 87 BPM | SYSTOLIC BLOOD PRESSURE: 128 MMHG | WEIGHT: 293 LBS | DIASTOLIC BLOOD PRESSURE: 100 MMHG | BODY MASS INDEX: 49.26 KG/M2 | TEMPERATURE: 98.3 F

## 2020-03-04 PROCEDURE — 4004F PT TOBACCO SCREEN RCVD TLK: CPT | Performed by: NURSE PRACTITIONER

## 2020-03-04 PROCEDURE — G8427 DOCREV CUR MEDS BY ELIG CLIN: HCPCS | Performed by: NURSE PRACTITIONER

## 2020-03-04 PROCEDURE — G8417 CALC BMI ABV UP PARAM F/U: HCPCS | Performed by: NURSE PRACTITIONER

## 2020-03-04 PROCEDURE — G8484 FLU IMMUNIZE NO ADMIN: HCPCS | Performed by: NURSE PRACTITIONER

## 2020-03-04 PROCEDURE — 99213 OFFICE O/P EST LOW 20 MIN: CPT | Performed by: NURSE PRACTITIONER

## 2020-03-04 RX ORDER — AMOXICILLIN AND CLAVULANATE POTASSIUM 875; 125 MG/1; MG/1
1 TABLET, FILM COATED ORAL 2 TIMES DAILY
Qty: 20 TABLET | Refills: 0 | Status: SHIPPED | OUTPATIENT
Start: 2020-03-04 | End: 2020-03-14

## 2020-03-04 ASSESSMENT — ENCOUNTER SYMPTOMS
ALLERGIC/IMMUNOLOGIC NEGATIVE: 1
SORE THROAT: 1
GASTROINTESTINAL NEGATIVE: 1
COUGH: 1
RHINORRHEA: 1
EYES NEGATIVE: 1

## 2020-03-04 NOTE — PROGRESS NOTES
Subjective:      Patient ID: Keisha Love is a 42 y.o. female. Pharyngitis   This is a new problem. The current episode started today. The problem occurs constantly. The problem has been gradually worsening. Associated symptoms include chills, congestion, coughing, fatigue, headaches and a sore throat. The treatment provided no relief. Dizziness   Associated symptoms include chills, congestion, coughing, fatigue, headaches and a sore throat. Review of Systems   Constitutional: Positive for chills and fatigue. HENT: Positive for congestion, ear pain, postnasal drip, rhinorrhea and sore throat. Eyes: Negative. Respiratory: Positive for cough. Cardiovascular: Negative. Gastrointestinal: Negative. Endocrine: Negative. Genitourinary: Negative. Musculoskeletal: Negative. Allergic/Immunologic: Negative. Neurological: Positive for dizziness and headaches. Hematological: Negative. Psychiatric/Behavioral: Negative.       Vitals:    03/04/20 1105   BP: (!) 128/100   Pulse: 87   Temp: 98.3 °F (36.8 °C)   SpO2: 98%     Wt Readings from Last 3 Encounters:   03/04/20 (!) 324 lb (147 kg)   02/13/20 (!) 326 lb (147.9 kg)   12/23/19 (!) 316 lb (143.3 kg)     BP Readings from Last 3 Encounters:   03/04/20 (!) 128/100   02/13/20 124/79   12/23/19 124/76     Past Medical History:   Diagnosis Date     History  of genital herpes     Allergic rhinitis due to pollen 1/26/2012    Anxiety     Carbuncle of breast 12/22/2015    Carpal tunnel syndrome of right wrist 2/15/2016    Cervical dysplasia     LEEP X2 THEN CRYOSURGERY    Contact lens/glasses fitting     Depression     Depressive disorder, not elsewhere classified 1/26/2012    BHUMI (generalized anxiety disorder) 10/15/2018    Ganglion cyst 10/14/2015    Hidradenitis suppurativa 7/29/2016    Hypothyroid     taking synthroid 50mcg    Postpartum hypertension 2/26/2014    only during incident with kidney failure--currently not

## 2020-03-18 ENCOUNTER — PATIENT MESSAGE (OUTPATIENT)
Dept: PSYCHIATRY | Age: 40
End: 2020-03-18

## 2020-03-18 RX ORDER — DIAZEPAM 5 MG/1
5 TABLET ORAL DAILY PRN
Qty: 6 TABLET | Refills: 0 | Status: SHIPPED | OUTPATIENT
Start: 2020-03-18 | End: 2020-03-24

## 2020-03-18 NOTE — TELEPHONE ENCOUNTER
From: Kandis Drivers  To: Jr Dwyer MD  Sent: 3/18/2020 11:48 AM EDT  Subject: Prescription Question    Dr. Marie Burnett,  I cannot refill my valium until the 24th on 6 days. Can i have 6 pills in an emergency refill to get me through until then?

## 2020-04-14 ENCOUNTER — VIRTUAL VISIT (OUTPATIENT)
Dept: PSYCHIATRY | Age: 40
End: 2020-04-14
Payer: COMMERCIAL

## 2020-04-14 PROCEDURE — 99213 OFFICE O/P EST LOW 20 MIN: CPT | Performed by: PSYCHIATRY & NEUROLOGY

## 2020-04-14 PROCEDURE — G8428 CUR MEDS NOT DOCUMENT: HCPCS | Performed by: PSYCHIATRY & NEUROLOGY

## 2020-04-14 RX ORDER — BUPROPION HYDROCHLORIDE 150 MG/1
150 TABLET ORAL EVERY MORNING
Qty: 30 TABLET | Refills: 3 | Status: SHIPPED | OUTPATIENT
Start: 2020-04-14 | End: 2020-07-09 | Stop reason: SDUPTHER

## 2020-04-14 RX ORDER — DIAZEPAM 5 MG/1
5 TABLET ORAL 2 TIMES DAILY PRN
Qty: 50 TABLET | Refills: 1 | Status: SHIPPED | OUTPATIENT
Start: 2020-04-21 | End: 2020-05-28 | Stop reason: SDUPTHER

## 2020-04-14 RX ORDER — BUSPIRONE HYDROCHLORIDE 5 MG/1
TABLET ORAL
Qty: 90 TABLET | Refills: 1 | Status: SHIPPED | OUTPATIENT
Start: 2020-04-14 | End: 2020-05-14

## 2020-05-28 ENCOUNTER — TELEPHONE (OUTPATIENT)
Dept: PRIMARY CARE CLINIC | Age: 40
End: 2020-05-28

## 2020-05-28 ENCOUNTER — OFFICE VISIT (OUTPATIENT)
Dept: PRIMARY CARE CLINIC | Age: 40
End: 2020-05-28
Payer: COMMERCIAL

## 2020-05-28 ENCOUNTER — VIRTUAL VISIT (OUTPATIENT)
Dept: PSYCHIATRY | Age: 40
End: 2020-05-28
Payer: COMMERCIAL

## 2020-05-28 VITALS — HEART RATE: 74 BPM | OXYGEN SATURATION: 98 % | TEMPERATURE: 98.9 F

## 2020-05-28 PROCEDURE — 99213 OFFICE O/P EST LOW 20 MIN: CPT | Performed by: NURSE PRACTITIONER

## 2020-05-28 PROCEDURE — 99213 OFFICE O/P EST LOW 20 MIN: CPT | Performed by: PSYCHIATRY & NEUROLOGY

## 2020-05-28 RX ORDER — DIAZEPAM 5 MG/1
5 TABLET ORAL 2 TIMES DAILY PRN
Qty: 50 TABLET | Refills: 1 | Status: SHIPPED | OUTPATIENT
Start: 2020-06-18 | End: 2020-08-06 | Stop reason: SDUPTHER

## 2020-05-28 NOTE — PROGRESS NOTES
Pulse: 74   Temp: 98.9 °F (37.2 °C)   SpO2: 98%        INSTRUCTIONS:  \"[x]\" Indicates a positive item  \"[]\" Indicates a negative item    DELETE ALL ITEMS NOT EXAMINED    [x] Alert  [x] Oriented to person/place/time    [x] No apparent distress  [] Toxic appearing    [] Face flushed appearing [] Sclera clear  [] Lips are cyanotic      [x] Breathing appears normal  [] Appears tachypneic  [] Speaks in complete sentences    [] Rash on visible skin    [] Cranial Nerves II-XII grossly intact    [x] Motor grossly intact in visible upper extremities    [] Motor grossly intact in visible lower extremities    [x] Normal Mood  [] Anxious appearing    [] Depressed appearing  [] Confused appearing      [] Poor short term memory  [] Poor long term memory    [] OTHER:  1}    TESTS ORDERED:    [] POCT FLU  [] POCT STREP  [x] COVID-19 Test sent    TEST RESULTS:    POCT FLU test:  [] Positive  [] Negative  POCT STREP test:  [] Positive  [] Negative    ASSESSMENT:     Diagnosis Orders   1. Suspected COVID-19 virus infection  COVID-19 Ambulatory       PLAN:  Rest, fluids tylenol  [x] Discharge home with written instructions for:  [] Flu management  [] Strep throat management  [] Viral respiratory illness management  [x] Possible COVID-19 infection with self-quarantine and management of symptoms  [x] Follow-up with primary care physician or emergency department if worsens  [] Referred to emergency department for evaluation      An  electronic signature was used to authenticate this note.      --KANNAN Pruitt CNP on 5/28/2020 at 12:06 PM

## 2020-05-28 NOTE — TELEPHONE ENCOUNTER
Your patient was seen at the [facility] today. A follow up virtual visit with the patient's PCP should be completed in 48 hours. Please schedule the patient for this follow-up. Thank you.

## 2020-05-30 LAB
SARS-COV-2: NOT DETECTED
SOURCE: NORMAL

## 2020-06-01 ENCOUNTER — VIRTUAL VISIT (OUTPATIENT)
Dept: INTERNAL MEDICINE CLINIC | Age: 40
End: 2020-06-01
Payer: COMMERCIAL

## 2020-06-01 PROCEDURE — 99442 PR PHYS/QHP TELEPHONE EVALUATION 11-20 MIN: CPT | Performed by: INTERNAL MEDICINE

## 2020-06-01 ASSESSMENT — PATIENT HEALTH QUESTIONNAIRE - PHQ9
SUM OF ALL RESPONSES TO PHQ QUESTIONS 1-9: 2
SUM OF ALL RESPONSES TO PHQ QUESTIONS 1-9: 2
SUM OF ALL RESPONSES TO PHQ9 QUESTIONS 1 & 2: 2
1. LITTLE INTEREST OR PLEASURE IN DOING THINGS: 1
2. FEELING DOWN, DEPRESSED OR HOPELESS: 1

## 2020-06-01 NOTE — RESULT ENCOUNTER NOTE
Please contact patient with their testing results: Your test for COVID-19, also known as novel coronavirus, came back negative. No virus was detected from the sample collected. Until your symptoms are fully resolved, you may still be contagious. We recommend that you remain isolated for 7 days minimum or 72 hours after your symptoms have completely resolved, whichever is longer. Continually monitor symptoms. Contact a medical provider if symptoms are worsening. If you have any additional questions, contact your PCP.     For additional information, please visit the Centers for Disease Control and Prevention   [email or web address]

## 2020-06-06 ENCOUNTER — VIRTUAL VISIT (OUTPATIENT)
Dept: NEUROLOGY | Age: 40
End: 2020-06-06
Payer: COMMERCIAL

## 2020-06-06 PROCEDURE — G8427 DOCREV CUR MEDS BY ELIG CLIN: HCPCS | Performed by: PSYCHIATRY & NEUROLOGY

## 2020-06-06 PROCEDURE — 99213 OFFICE O/P EST LOW 20 MIN: CPT | Performed by: PSYCHIATRY & NEUROLOGY

## 2020-06-06 NOTE — PROGRESS NOTES
10/23/2019    GLUCOSE 96 10/23/2019       Impression :  Pseudotumor cerebri  Opening pressure in the past was 39 cm of CSF  MRI brain normal  MR venogram showed mild stenosis of the transverse sinus but no thrombus was seen  Symptoms much improved on Diamox  Patient tells me that her recent eye exam by the ophthalmologist showed good improvement in papilledema    Plan :  Discussed with patient  Continue Diamox 250 mg twice daily  She will call when she needs refills on the medicine  Strongly urged her to continue to lose weight  Follow-up with ophthalmologist in August 2020  I will see her back in October 2020      Please note a portion of  this chart was generated using dragon dictation software. Although every effort was made to ensure the accuracy of this automated transcription, some errors in transcription may have occurred. Pursuant to the emergency declaration under the [facility] [place], [facility] waiver authority and the [employer] Act, this Virtual  Visit was conducted, with patient's consent, to reduce the patient's risk of exposure to COVID-19 and provide continuity of care for an established patient. Services were provided through a video synchronous discussion virtually to substitute for in-person clinic visit.

## 2020-06-23 ENCOUNTER — OFFICE VISIT (OUTPATIENT)
Dept: DERMATOLOGY | Age: 40
End: 2020-06-23
Payer: COMMERCIAL

## 2020-06-23 VITALS — TEMPERATURE: 98.2 F

## 2020-06-23 PROCEDURE — 4004F PT TOBACCO SCREEN RCVD TLK: CPT | Performed by: DERMATOLOGY

## 2020-06-23 PROCEDURE — 99213 OFFICE O/P EST LOW 20 MIN: CPT | Performed by: DERMATOLOGY

## 2020-06-23 PROCEDURE — G8417 CALC BMI ABV UP PARAM F/U: HCPCS | Performed by: DERMATOLOGY

## 2020-06-23 PROCEDURE — G8427 DOCREV CUR MEDS BY ELIG CLIN: HCPCS | Performed by: DERMATOLOGY

## 2020-06-23 NOTE — PROGRESS NOTES
[facility]  Jos Winston MD  1953 [employer]  1980    42 y.o. female     Date of Visit: 6/23/2020    Chief Complaint: boils    History of Present Illness:    She returns today to follow-up for many year history of hidradenitis suppurativa. It has worsened over time. She complains of near constant activity with multiple painful nodules in the groin, proximal inner thighs and inframammary regions. The lesions are so painful that it makes it difficult to ambulate without pain and to work (she has had to call off shifts at work due to the pain and discomfort). She was briefly treated with minocycline in the past with temporary improvement, however it was complicated by pseudotumor cerebri. She currently is taking Bactrim with only mild improvement. Had pseudotumor cerebri with minocycline. On Diamox - sees neuro and ophthalmology. She is actively pursuing weight loss. Review of Systems:  Gen: Feels well, good sense of health. Past Medical History, Family History, Surgical History, Medications and Allergies reviewed.     Past Medical History:   Diagnosis Date     History  of genital herpes     Allergic rhinitis due to pollen 1/26/2012    Anxiety     Carbuncle of breast 12/22/2015    Carpal tunnel syndrome of right wrist 2/15/2016    Cervical dysplasia     LEEP X2 THEN CRYOSURGERY    Contact lens/glasses fitting     Depression     Depressive disorder, not elsewhere classified 1/26/2012    BHUMI (generalized anxiety disorder) 10/15/2018    Ganglion cyst 10/14/2015    Hidradenitis suppurativa 7/29/2016    Hypothyroid     taking synthroid 50mcg    Postpartum hypertension 2/26/2014    only during incident with kidney failure--currently not medicated for  HTN    Vulval hidradenitis suppurativa 11/19/2015     Past Surgical History:   Procedure Laterality Date    CARPAL TUNNEL RELEASE Right 2/9/16    CARPAL TUNNEL RELEASE Left 4-12-16     SECTION  2014    emergency C section    GYNECOLOGIC CRYOSURGERY      INDUCED       LEEP      times 2    TUBAL LIGATION  2014    WISDOM TOOTH EXTRACTION         Allergies   Allergen Reactions    Minocycline Other (See Comments)     Headache and dizziness    Nsaids Other (See Comments)     KIDNEY FAILURE     Outpatient Medications Marked as Taking for the 20 encounter (Office Visit) with Michael Obrien MD   Medication Sig Dispense Refill    diazePAM (VALIUM) 5 MG tablet Take 1 tablet by mouth 2 times daily as needed for Anxiety for up to 60 days. Do not fill before 2020 50 tablet 1    buPROPion (WELLBUTRIN XL) 150 MG extended release tablet Take 1 tablet by mouth every morning 30 tablet 3    acetaZOLAMIDE (DIAMOX) 250 MG tablet TAKE 1 TABLET BY MOUTH TWICE DAILY 60 tablet 0    albuterol sulfate HFA (PROVENTIL HFA) 108 (90 Base) MCG/ACT inhaler Inhale 2 puffs into the lungs every 6 hours as needed for Wheezing 1 Inhaler 3    levothyroxine (SYNTHROID) 125 MCG tablet TAKE 1 TABLET BY MOUTH EVERY DAY 30 tablet 5    spironolactone (ALDACTONE) 25 MG tablet TAKE 1 TABLET BY MOUTH EVERY DAY 30 tablet 5    valACYclovir (VALTREX) 500 MG tablet TAKE 1 TABLET BY MOUTH TWICE DAILY 60 tablet 1    clindamycin (CLEOCIN T) 1 % external solution Apply to affected areas twice daily. 60 mL 4    acetaminophen (APAP EXTRA STRENGTH) 500 MG tablet Take 2 tablets by mouth every 6 hours as needed for Pain 120 tablet 3           Physical Examination       The following were examined and determined to be normal: Psych/Neuro, Head/face, Neck, RUE and LUE. The following were examined and determined to be abnormal: Breast/axilla/chest, RLE and LLE. Well appearing. 1.  Inframammary region, proximal inner thighs and groin -multiple tender erythematous nodules, multiple scars, and several comedones. No sinus tracts are present today. Axillae with old scarring. Assessment and Plan     1. Hidradenitis suppurativa - [place] stage II with predominance of scarring and multiple nodules and abscesses, worsened with increased numbers of lesions and near constant activity, limits ability to work and negatively impacts quality of life. Discussed other treatment options including Humira. We discussed risks including serious infection and other reported rare risks (heart issues, MS like reactions). Check QuantiFERON TB test, CBC and differential, renal profile and LFTs. As long as the labs are okay, will pursue treatment with Humira. Return in about 3 months (around 9/23/2020).

## 2020-07-01 ENCOUNTER — TELEPHONE (OUTPATIENT)
Dept: DERMATOLOGY | Age: 40
End: 2020-07-01

## 2020-07-01 NOTE — TELEPHONE ENCOUNTER
Dr Monsivais Sep patient  Pt c/b #[phone]   Pt stated  Has a breakout all over her body that looks like hives took benadryl it helped for the night then by the morning it has gotten worse red,itch, raised it also gets worse when she sweats she doesn't know what to do she's miserable.   Please c/b to discuss

## 2020-07-02 NOTE — TELEPHONE ENCOUNTER
Called and spk to pt reg concern, she states that she ended up going to urgent care and they prescribe her some meds and got some shots for allergy reactions and that cleared everything up, she had gone to aquarium and she is allergic to fish she believes it started there. She just wants Dr to be aware that this happened. Addressed that I will let Dr know.

## 2020-07-02 NOTE — TELEPHONE ENCOUNTER
She complains of new onset hives that appeared a couple of days ago. Appeared after being at the aquarium Monday. Doing better on steroids. Instructed to take Zyrtec daily.

## 2020-07-06 ENCOUNTER — TELEPHONE (OUTPATIENT)
Dept: DERMATOLOGY | Age: 40
End: 2020-07-06

## 2020-07-09 ENCOUNTER — VIRTUAL VISIT (OUTPATIENT)
Dept: PSYCHIATRY | Age: 40
End: 2020-07-09
Payer: COMMERCIAL

## 2020-07-09 PROCEDURE — 90833 PSYTX W PT W E/M 30 MIN: CPT | Performed by: PSYCHIATRY & NEUROLOGY

## 2020-07-09 PROCEDURE — 99213 OFFICE O/P EST LOW 20 MIN: CPT | Performed by: PSYCHIATRY & NEUROLOGY

## 2020-07-09 RX ORDER — FLUOXETINE HYDROCHLORIDE 20 MG/1
20 CAPSULE ORAL DAILY
Qty: 30 CAPSULE | Refills: 2 | Status: SHIPPED | OUTPATIENT
Start: 2020-07-09 | End: 2020-09-24 | Stop reason: SDUPTHER

## 2020-07-09 RX ORDER — BUPROPION HYDROCHLORIDE 150 MG/1
150 TABLET ORAL EVERY MORNING
Qty: 30 TABLET | Refills: 3 | Status: SHIPPED | OUTPATIENT
Start: 2020-07-09 | End: 2021-02-22

## 2020-07-09 NOTE — PROGRESS NOTES
PSYCHIATRY PROGRESS NOTE  TELEHEALTH VISIT    Jaleel Holland  1980  7/9/2020  Patient location: home  Provider location: office  Face to Face time: 40 min  PCP: Colette Nissen, MD      ASSESSMENT:   43 yo F who struggles with poor frustration tolerance and anxiety. Not doing well. buspar not tolerated. SSRI would be more beneficial but sertraline caused a lot of weight gain she she has been hesitant. Therapy helped but she no longer is seeing our [facility]. Long term diazepam really hasn't helped her and the sooner she can accept trying to get off the better it will be in the long run. 1. Generalized anxiety disorder  2. Unspecified depressive disorder  3. Tobacco use disorder  4. Obesity  5. hidrandenitis suppurativa, Psuedotumor cerebri    PLAN:   1. Continue diazepam 5mg BID prn, continue 50 tabs/month. Long discussion today and education on how it may have impacted mood instability and judgement long term. 2. Continue bupropion XL 150mg daily  3. Will start fluoxetine 20mg daily. Discussed r/b/se. 4. Referral added to Dr. Car Jimenez. I spent 25 min on psychotherapy with the patient.      Medication Monitoring:    - OARRS reviewed, no issues noted      Follow-up: RTC in 4 weeks  Safety: RF include anxiety, hx of self harm. Pt is low risk for future dangerousness to self or others at this time   _________________________________    CC:   Chief Complaint   Patient presents with    Depression    Anxiety     S:   Pursuant to the emergency declaration under the [facility] [place], 1135 waiver authority and the [employer] Act, this Virtual  Visit was conducted, with patient's consent, to reduce the patient's risk of exposure to COVID-19 and provide continuity of care for an established patient. Services were provided through a video synchronous discussion virtually to substitute for in-person clinic visit. Doxy. me used for the Virtual Video. Pt reports ongoing problems with anxiety and depression. Her depression seems to include excessive fatigue, self doubt, low self esteem, frequently viewing herself as flawed and a failure. Anxiety causes a lot of irritability and can contribute to wide swings in her mood during the day based on small triggers. She worries excessively about the future and the general state of the world and how this may impact her son. She is often anxious about her son, Neil Zavala, and how what she perceives are poor qualities about herself may rub off on him. She may be hypersensitive to things he says or does and view them as her negative influence on him. She continues to struggle with weight loss and this is a large source of stress. She is also having a flare of her hidrandenitis suppurativa, something she has dealt with chronically - impacts her self consciousness, mood and anxiety. THERAPY GOALS: improve self esteem, reduce depression and anxiety  THERAPY MODALITIES: supportive, CBT  THERAPY NOTES: challenged assumptions about herself as unaccomplished, not a leader, a poor mother, etc. Discussed evidence for and against. Explored her specific fears about the world and evidence against these things occurring. Normalized her anxieties about the future. Reinforced positive aspects about her parenting. ROS: +boils in thighs/breasts. No cp, no palpitations, no headaches, no weight changes, no tremors, no diaphoresis, no abd pain, no n/v/d    Brief Medical Hx:   Pseudotumor cerebri, Hypothyroidism, morbid obesity, hidrandenitis suppurativa    Brief Psych Hx:  Med trials: lexapro, celexa, paxil (could not tolerate any of these, nausea), duloxetine (did not tolerate). Did take sertraline, not sure how long or what dose, but she did not have side effects.  Been on diazepam 5mg BID for over 10 yrs  NSSI: did cut self 4 times in late teens, but denies intent to end life    Current Outpatient Medications   Medication Sig Dispense Refill    diazePAM (VALIUM) 5 MG tablet Take 1 tablet by mouth 2 times daily as needed for Anxiety for up to 60 days. Do not fill before 6/18/2020 50 tablet 1    buPROPion (WELLBUTRIN XL) 150 MG extended release tablet Take 1 tablet by mouth every morning 30 tablet 3    acetaZOLAMIDE (DIAMOX) 250 MG tablet TAKE 1 TABLET BY MOUTH TWICE DAILY 60 tablet 0    albuterol sulfate HFA (PROVENTIL HFA) 108 (90 Base) MCG/ACT inhaler Inhale 2 puffs into the lungs every 6 hours as needed for Wheezing 1 Inhaler 3    levothyroxine (SYNTHROID) 125 MCG tablet TAKE 1 TABLET BY MOUTH EVERY DAY 30 tablet 5    spironolactone (ALDACTONE) 25 MG tablet TAKE 1 TABLET BY MOUTH EVERY DAY 30 tablet 5    valACYclovir (VALTREX) 500 MG tablet TAKE 1 TABLET BY MOUTH TWICE DAILY 60 tablet 1    clindamycin (CLEOCIN T) 1 % external solution Apply to affected areas twice daily. 60 mL 4    acetaminophen (APAP EXTRA STRENGTH) 500 MG tablet Take 2 tablets by mouth every 6 hours as needed for Pain 120 tablet 3     No current facility-administered medications for this visit.         O:  Wt Readings from Last 3 Encounters:   03/04/20 (!) 324 lb (147 kg)   02/13/20 (!) 326 lb (147.9 kg)   12/23/19 (!) 316 lb (143.3 kg)     Temp Readings from Last 3 Encounters:   06/23/20 98.2 °F (36.8 °C) (Temporal)   05/28/20 98.9 °F (37.2 °C)   03/04/20 98.3 °F (36.8 °C) (Axillary)     BP Readings from Last 3 Encounters:   03/04/20 (!) 128/100   02/13/20 124/79   12/23/19 124/76     Pulse Readings from Last 3 Encounters:   05/28/20 74   03/04/20 87   02/13/20 72     PHQ Scores 6/1/2020 12/12/2019 5/28/2019 8/20/2018 3/20/2018 1/22/2018 8/7/2017   PHQ2 Score 2 0 2 2 0 0 0   PHQ9 Score 2 6 10 14 5 5 1     Interpretation of Total Score Depression Severity: 1-4 = Minimal depression, 5-9 = Mild depression, 10-14 = Moderate depression, 15-19 = Moderately severe depression, 20-27 = Severe depression    BHUMI 7 SCORE 12/12/2019 5/28/2019 11/2/2018 8/17/2018 3/20/2018 1/22/2018 8/7/2017   BHUMI-7 Total Score 8 11 12 12 11 11 6     Interpretation of BHUMI-7 score: 5-9 = mild anxiety, 10-14 = moderate anxiety, 15+ = severe anxiety. Recommend referral to behavioral health for scores 10 or greater. Mental Status Exam:   Appearance    No acute distress, well dressed and groomed,   Motor: No abnormal movements, tics or mannerisms. Speech    spontaneous, normal rate and normal volume  Mood/Affect   depressed / constricted, tearful often  Thought Process    linear, goal directed and coherent  Thought Content    intact , future oriented, no suicidal ideation  Associations    logical connections  Attention/Concentration    intact  Memory    recent and remote memory intact  Insight/Judgement    Good / Intact    Labs:     Orders Only on 06/23/2020   Component Date Value Ref Range Status    Quantiferon TB Minus NIL 06/23/2020 Negative  Negative Final    Comment: Interpretive Data: Quantiferon TB Gold Plus  Interferon gamma release is measured for specimens from each of the  four  collection tubes. A qualitative result (Negative, Positive, or  Indeterminate)  is based on interpretation of the four values, NIL, MITOGEN minus NIL  (MITOGEN-NIL), TB1 minus NIL (TB1-NIL), and TB2 minus NIL (TB2-NIL). The NIL  value represents nonspecific reactivity produced by the patient  specimen. The  MITOGEN-NIL value serves as the positive control for the patient  specimen,  demonstrating successful lymphocyte activity. The TB1-NIL tube  specifically  detects CD4+ lymphocyte reactivity, specifically stimulated by the TB1  antigens. The TB2-NIL tube detects both CD4+ and CD8+ lymphocyte  reactivity,  stimulated by TB2 antigens. An overall Negative result does not  completely  rule out TB infection. A false-positive result in the absence of other clinical evidence of TB  infection is not uncommon.  Refer to: Updated Guidelines for Using  Interferon  Gamma Estelle Moran se Assays to Detect Mycobacterium tuberculosis Infection ---  [place], 2010  ([email or web address].[email or web address].ee),  for more information concerning test performance in low-prevalence  populations and use in occupational screening.  Quantiferon TB1 Minus NIL 06/23/2020 0.00  0.00 - 0.34 IU/mL Final    Quantiferon TB2 Minus NIL 06/23/2020 0.00  0.00 - 0.34 IU/mL Final    QuantiFERON Mitogen 06/23/2020 >10.00  IU/mL Final    QuantiFERON Nil 06/23/2020 0.04  IU/mL Final    Comment: Performed by [facility] [address], [address] [facility] [phone]  www. Leann Miranda MD, Lab. Director      Sodium 06/23/2020 142  136 - 145 mmol/L Final    Potassium 06/23/2020 4.7  3.5 - 5.1 mmol/L Final    Chloride 06/23/2020 107  99 - 110 mmol/L Final    CO2 06/23/2020 20* 21 - 32 mmol/L Final    Anion Gap 06/23/2020 15  3 - 16 Final    Glucose 06/23/2020 94  70 - 99 mg/dL Final    BUN 06/23/2020 10  7 - 20 mg/dL Final    CREATININE 06/23/2020 1.0  0.6 - 1.1 mg/dL Final    GFR Non- 06/23/2020 >60  >60 Final    Comment: >60 mL/min/1.73m2 EGFR, calc. for ages 25 and older using the  MDRD formula (not corrected for weight), is valid for stable  renal function.  GFR  06/23/2020 >60  >60 Final    Comment: Chronic Kidney Disease: less than 60 ml/min/1.73 sq.m. Kidney Failure: less than 15 ml/min/1.73 sq.m. Results valid for patients 18 years and older.       Calcium 06/23/2020 9.2  8.3 - 10.6 mg/dL Final    Phosphorus 06/23/2020 3.0  2.5 - 4.9 mg/dL Final    Alb 06/23/2020 4.5  3.4 - 5.0 g/dL Final    Total Protein 06/23/2020 7.0  6.4 - 8.2 g/dL Final    Alkaline Phosphatase 06/23/2020 76  40 - 129 U/L Final    ALT 06/23/2020 11  10 - 40 U/L Final    AST 06/23/2020 10* 15 - 37 U/L Final    Total Bilirubin 06/23/2020 <0.2  0.0 - 1.0 mg/dL Final    Bilirubin, Direct 06/23/2020 <0.2  0.0 - 0.3 mg/dL Final    Bilirubin, Indirect 2020 see below  0.0 - 1.0 mg/dL Final    Comment: Indirect Bilirubin cannot be calculated since Total Bilirubin  and/or Direct Bilirubin is below measurable range.  WBC 2020 9.2  4.0 - 11.0 K/uL Final    RBC 2020 4.96  4.00 - 5.20 M/uL Final    Hemoglobin 2020 14.5  12.0 - 16.0 g/dL Final    Hematocrit 2020 44.5  36.0 - 48.0 % Final    MCV 2020 89.6  80.0 - 100.0 fL Final    MCH 2020 29.3  26.0 - 34.0 pg Final    MCHC 2020 32.7  31.0 - 36.0 g/dL Final    RDW 2020 14.1  12.4 - 15.4 % Final    Platelets  224  135 - 450 K/uL Final    MPV 2020 10.4  5.0 - 10.5 fL Final    Neutrophils % 2020 64.3  % Final    Lymphocytes % 2020 24.9  % Final    Monocytes % 2020 7.3  % Final    Eosinophils % 2020 2.8  % Final    Basophils % 2020 0.7  % Final    Neutrophils Absolute 2020 5.9  1.7 - 7.7 K/uL Final    Lymphocytes Absolute 2020 2.3  1.0 - 5.1 K/uL Final    Monocytes Absolute 2020 0.7  0.0 - 1.3 K/uL Final    Eosinophils Absolute 2020 0.3  0.0 - 0.6 K/uL Final    Basophils Absolute 2020 0.1  0.0 - 0.2 K/uL Final       EK17: Rate 77 bpm, Normal sinus rhythm. Minimal voltage criteria for LVH, may be normal variant. QTc 443ms.        Carlos Kerr MD  Psychiatry

## 2020-07-21 ENCOUNTER — TELEPHONE (OUTPATIENT)
Dept: DERMATOLOGY | Age: 40
End: 2020-07-21

## 2020-07-21 NOTE — TELEPHONE ENCOUNTER
Dr. Eddie Santacruz patient     Patient left voice mail on 7/20 [facility] to know the status of her Marcia?     Call back # [phone]

## 2020-07-21 NOTE — TELEPHONE ENCOUNTER
Called and spoke to patient and she said [facility] did not receive the prescription for the starting dose for Humira. I will refax prescriptions.

## 2020-07-23 ENCOUNTER — TELEPHONE (OUTPATIENT)
Dept: DERMATOLOGY | Age: 40
End: 2020-07-23

## 2020-07-23 NOTE — TELEPHONE ENCOUNTER
Voicemail received 7/22/20 at 6/24 pm    Oswaldo Tafoya 12        Clarification of Humira     1. Which starter pack  2. Issue date  3.   Quantity

## 2020-07-23 NOTE — TELEPHONE ENCOUNTER
Called [facility] and spoke to [facility] to confirm that patient need the Humira pen starter pack 80mg/0.8ml (CF). Informed patient to expect a call from the pharmacy.

## 2020-08-03 ENCOUNTER — OFFICE VISIT (OUTPATIENT)
Dept: PRIMARY CARE CLINIC | Age: 40
End: 2020-08-03
Payer: COMMERCIAL

## 2020-08-03 ENCOUNTER — TELEPHONE (OUTPATIENT)
Dept: DERMATOLOGY | Age: 40
End: 2020-08-03

## 2020-08-03 PROCEDURE — G8417 CALC BMI ABV UP PARAM F/U: HCPCS | Performed by: NURSE PRACTITIONER

## 2020-08-03 PROCEDURE — 99211 OFF/OP EST MAY X REQ PHY/QHP: CPT | Performed by: NURSE PRACTITIONER

## 2020-08-03 PROCEDURE — G8428 CUR MEDS NOT DOCUMENT: HCPCS | Performed by: NURSE PRACTITIONER

## 2020-08-03 NOTE — TELEPHONE ENCOUNTER
Dr Sima Silva Patient  Cordell Mercado from [address] [place] [address] c/b #[phone] ext. Andria Burleson stated  Calling to see if dr Milian Person received a prior authorization for pt.   Please c/b to discuss

## 2020-08-03 NOTE — TELEPHONE ENCOUNTER
Patient called with additional information:     Julee Pineda is requesting a call with a PLU. Can we cntact [facility]?

## 2020-08-03 NOTE — PATIENT INSTRUCTIONS

## 2020-08-03 NOTE — PROGRESS NOTES
Garcia Loredo received a viral test for COVID-19. They were educated on isolation and quarantine as appropriate. For any symptoms, they were directed to seek care from their PCP, given contact information to establish with a doctor, directed to an urgent care or the emergency room.

## 2020-08-04 LAB — SARS-COV-2, NAA: NOT DETECTED

## 2020-08-05 NOTE — TELEPHONE ENCOUNTER
Called and spoke to Olayinka Rivas at [employer] and she stated that she thinks the [employer] needs a separate PA from the matinence dose but needs to double check. She also said the patient needs to call [phone] to set up financial assistance for the [facility]. Informed patient and she is going to call today.

## 2020-08-06 ENCOUNTER — VIRTUAL VISIT (OUTPATIENT)
Dept: PSYCHIATRY | Age: 40
End: 2020-08-06
Payer: COMMERCIAL

## 2020-08-06 PROCEDURE — 99213 OFFICE O/P EST LOW 20 MIN: CPT | Performed by: PSYCHIATRY & NEUROLOGY

## 2020-08-06 RX ORDER — DIAZEPAM 5 MG/1
5 TABLET ORAL 2 TIMES DAILY PRN
Qty: 50 TABLET | Refills: 1 | Status: SHIPPED | OUTPATIENT
Start: 2020-08-16 | End: 2020-09-24 | Stop reason: SDUPTHER

## 2020-08-06 NOTE — PROGRESS NOTES
PSYCHIATRY PROGRESS NOTE  TELEHEALTH VISIT    Solo Dalton  1980  8/6/2020  Patient location: home  Provider location: office  Face to Face time: 20 min  PCP: Maria M José MD      ASSESSMENT:   43 yo F who struggles with poor frustration tolerance and anxiety. Fluoxetine is helping, may help further with more time on the medication and may help her utilize other coping strategies more. 1. Generalized anxiety disorder  2. Unspecified depressive disorder  3. Tobacco use disorder  4. Obesity  5. hidrandenitis suppurativa, Psuedotumor cerebri    PLAN:   1. Continue diazepam 5mg BID prn, continue 50 tabs/month. 2. Continue bupropion XL 150mg daily  3. Continue fluoxetine 20mg daily  4. Referral previously made to Dr. Bruce Mariano but patient hasn't followed through  5. Discussed writing lists of tasks to help organize her thoughts and reduce anxiety       Medication Monitoring:    - OARRS reviewed, no issues noted      Follow-up: RTC in 4 weeks  Safety: RF include anxiety, hx of self harm. Pt is low risk for future dangerousness to self or others at this time   _________________________________    CC:   Chief Complaint   Patient presents with    Anxiety     S:   Pursuant to the emergency declaration under the [facility], [facility] waiver authority and the [employer] Act, this Virtual  Visit was conducted, with patient's consent, to reduce the patient's risk of exposure to COVID-19 and provide continuity of care for an established patient. Services were provided through a video synchronous discussion virtually to substitute for in-person clinic visit. Doxy. me used for the [employer]. Pt reports a clear benefit from the fluoxetine. She feels her anxiety is more manageable, her concentration is better \"I feel more clear headed\", energy is a little better.  She typically wakes with multiple worries in the morning and this is less intense. Irritability and occasional crying spells still occur but this is less often. She feels more positive, more like she can focus on only the things she can control rather than multiple things she cant which has changed how she views her role as a mother. Overall she likes the effect of fluoxetine. She continues to have a lot of anxiety during the day but feels it is more manageable. ROS: No cp, no palpitations, no headaches, no weight changes, no tremors, no diaphoresis, no abd pain, no n/v/d    Brief Medical Hx:   Pseudotumor cerebri, Hypothyroidism, morbid obesity, hidrandenitis suppurativa    Brief Psych Hx:  Med trials: lexapro, celexa, paxil (could not tolerate any of these, nausea), duloxetine (did not tolerate). Did take sertraline, not sure how long or what dose, but she did not have side effects. Been on diazepam 5mg BID for over 10 yrs  NSSI: did cut self 4 times in late teens, but denies intent to end life    Current Outpatient Medications   Medication Sig Dispense Refill    acetaZOLAMIDE (DIAMOX) 250 MG tablet Take 1 tablet by mouth 2 times daily 60 tablet 2    buPROPion (WELLBUTRIN XL) 150 MG extended release tablet Take 1 tablet by mouth every morning 30 tablet 3    FLUoxetine (PROZAC) 20 MG capsule Take 1 capsule by mouth daily 30 capsule 2    diazePAM (VALIUM) 5 MG tablet Take 1 tablet by mouth 2 times daily as needed for Anxiety for up to 60 days.  Do not fill before 6/18/2020 50 tablet 1    albuterol sulfate HFA (PROVENTIL HFA) 108 (90 Base) MCG/ACT inhaler Inhale 2 puffs into the lungs every 6 hours as needed for Wheezing 1 Inhaler 3    levothyroxine (SYNTHROID) 125 MCG tablet TAKE 1 TABLET BY MOUTH EVERY DAY 30 tablet 5    spironolactone (ALDACTONE) 25 MG tablet TAKE 1 TABLET BY MOUTH EVERY DAY 30 tablet 5    valACYclovir (VALTREX) 500 MG tablet TAKE 1 TABLET BY MOUTH TWICE DAILY 60 tablet 1    clindamycin (CLEOCIN T) 1 % external solution Apply to affected areas twice daily. 60 mL 4    acetaminophen (APAP EXTRA STRENGTH) 500 MG tablet Take 2 tablets by mouth every 6 hours as needed for Pain 120 tablet 3     No current facility-administered medications for this visit. O:  Wt Readings from Last 3 Encounters:   03/04/20 (!) 324 lb (147 kg)   02/13/20 (!) 326 lb (147.9 kg)   12/23/19 (!) 316 lb (143.3 kg)     Temp Readings from Last 3 Encounters:   06/23/20 98.2 °F (36.8 °C) (Temporal)   05/28/20 98.9 °F (37.2 °C)   03/04/20 98.3 °F (36.8 °C) (Axillary)     BP Readings from Last 3 Encounters:   03/04/20 (!) 128/100   02/13/20 124/79   12/23/19 124/76     Pulse Readings from Last 3 Encounters:   05/28/20 74   03/04/20 87   02/13/20 72     PHQ Scores 6/1/2020 12/12/2019 5/28/2019 8/20/2018 3/20/2018 1/22/2018 8/7/2017   PHQ2 Score 2 0 2 2 0 0 0   PHQ9 Score 2 6 10 14 5 5 1     Interpretation of Total Score Depression Severity: 1-4 = Minimal depression, 5-9 = Mild depression, 10-14 = Moderate depression, 15-19 = Moderately severe depression, 20-27 = Severe depression    BHUMI 7 SCORE 12/12/2019 5/28/2019 11/2/2018 8/17/2018 3/20/2018 1/22/2018 8/7/2017   BHUMI-7 Total Score 8 11 12 12 11 11 6     Interpretation of BHUMI-7 score: 5-9 = mild anxiety, 10-14 = moderate anxiety, 15+ = severe anxiety. Recommend referral to behavioral health for scores 10 or greater. Mental Status Exam:   Appearance    No acute distress, well dressed and groomed,   Motor: No abnormal movements, tics or mannerisms.   Speech    spontaneous, normal rate and normal volume  Mood/Affect   better /full quality good motility and range  Thought Process    linear, goal directed and coherent  Thought Content    intact , future oriented, no suicidal ideation  Associations    logical connections  Attention/Concentration    intact  Memory    recent and remote memory intact  Insight/Judgement    Good / Intact    Labs:     Office Visit on 08/03/2020   Component Date Value Ref Range Status    SARS-CoV-2, SHIN 2020 NOT DETECTED  NOT DETECTED Final    Comment: The SARS-CoV-2 assay is a real-time RT-PCR test intended for the  qualitative detection of nucleic acid from the SARS-CoV-2 in  respiratory specimens from individuals. Testing is limited to the  guidelines of FDA Emergency Use Authorization FDA for performing  SARS-CoV-2 testing. A Non-Detected result does not preclude the possibility of 2019-nCoV  infection since the adequacy of sample collection and/or low viral  burden may result in the presence of viral nucleic acids below the  analytical sensitivity of this test method. Test results should be used  with caution and in conjunction with other clinical and laboratory data  in making a diagnosis. Performed at: [address] [place] [address], [address] [place] [address]  : Spencer Whitehead MD         EK17: Rate 77 bpm, Normal sinus rhythm. Minimal voltage criteria for LVH, may be normal variant. QTc 443ms.        Cata Smith MD  Psychiatry

## 2020-09-02 ENCOUNTER — PATIENT MESSAGE (OUTPATIENT)
Dept: DERMATOLOGY | Age: 40
End: 2020-09-02

## 2020-09-16 ENCOUNTER — TELEPHONE (OUTPATIENT)
Dept: DERMATOLOGY | Age: 40
End: 2020-09-16

## 2020-09-16 NOTE — TELEPHONE ENCOUNTER
Patient cancelled appointment for 9/2/20 with Dr Tristan Martinez - the cancellation note said,     Artemio Victor we reschedule? I never got started on humeria, they never sent it. Honestly I'm afraid to take it now after letting the side effects sink in. \"    I had previously tried contacting patient multiple times to see if she has received the Humira but didn't get a response back. Called and left message for patient to call back office to reschedule appointment. Please advise as to when I should schedule her back.

## 2020-09-24 ENCOUNTER — OFFICE VISIT (OUTPATIENT)
Dept: PSYCHIATRY | Age: 40
End: 2020-09-24
Payer: COMMERCIAL

## 2020-09-24 VITALS
WEIGHT: 293 LBS | DIASTOLIC BLOOD PRESSURE: 78 MMHG | OXYGEN SATURATION: 98 % | HEART RATE: 78 BPM | SYSTOLIC BLOOD PRESSURE: 119 MMHG | BODY MASS INDEX: 47.59 KG/M2 | TEMPERATURE: 97.9 F

## 2020-09-24 PROCEDURE — 90833 PSYTX W PT W E/M 30 MIN: CPT | Performed by: PSYCHIATRY & NEUROLOGY

## 2020-09-24 PROCEDURE — G8417 CALC BMI ABV UP PARAM F/U: HCPCS | Performed by: PSYCHIATRY & NEUROLOGY

## 2020-09-24 PROCEDURE — 4004F PT TOBACCO SCREEN RCVD TLK: CPT | Performed by: PSYCHIATRY & NEUROLOGY

## 2020-09-24 PROCEDURE — 99213 OFFICE O/P EST LOW 20 MIN: CPT | Performed by: PSYCHIATRY & NEUROLOGY

## 2020-09-24 PROCEDURE — G8427 DOCREV CUR MEDS BY ELIG CLIN: HCPCS | Performed by: PSYCHIATRY & NEUROLOGY

## 2020-09-24 RX ORDER — DIAZEPAM 5 MG/1
5 TABLET ORAL 2 TIMES DAILY PRN
Qty: 40 TABLET | Refills: 1 | Status: SHIPPED | OUTPATIENT
Start: 2020-10-14 | End: 2020-12-13

## 2020-09-24 RX ORDER — FLUOXETINE HYDROCHLORIDE 20 MG/1
40 CAPSULE ORAL DAILY
Qty: 60 CAPSULE | Refills: 2 | Status: SHIPPED | OUTPATIENT
Start: 2020-09-24 | End: 2020-12-22 | Stop reason: SDUPTHER

## 2020-09-24 ASSESSMENT — PATIENT HEALTH QUESTIONNAIRE - PHQ9
SUM OF ALL RESPONSES TO PHQ QUESTIONS 1-9: 6
2. FEELING DOWN, DEPRESSED OR HOPELESS: 1
1. LITTLE INTEREST OR PLEASURE IN DOING THINGS: 0
SUM OF ALL RESPONSES TO PHQ9 QUESTIONS 1 & 2: 1
4. FEELING TIRED OR HAVING LITTLE ENERGY: 1
5. POOR APPETITE OR OVEREATING: 0
3. TROUBLE FALLING OR STAYING ASLEEP: 1
9. THOUGHTS THAT YOU WOULD BE BETTER OFF DEAD, OR OF HURTING YOURSELF: 1
SUM OF ALL RESPONSES TO PHQ QUESTIONS 1-9: 6
6. FEELING BAD ABOUT YOURSELF - OR THAT YOU ARE A FAILURE OR HAVE LET YOURSELF OR YOUR FAMILY DOWN: 2
7. TROUBLE CONCENTRATING ON THINGS, SUCH AS READING THE NEWSPAPER OR WATCHING TELEVISION: 0
8. MOVING OR SPEAKING SO SLOWLY THAT OTHER PEOPLE COULD HAVE NOTICED. OR THE OPPOSITE, BEING SO FIGETY OR RESTLESS THAT YOU HAVE BEEN MOVING AROUND A LOT MORE THAN USUAL: 0

## 2020-09-24 ASSESSMENT — ANXIETY QUESTIONNAIRES
4. TROUBLE RELAXING: 0-NOT AT ALL
5. BEING SO RESTLESS THAT IT IS HARD TO SIT STILL: 0-NOT AT ALL
7. FEELING AFRAID AS IF SOMETHING AWFUL MIGHT HAPPEN: 1-SEVERAL DAYS
6. BECOMING EASILY ANNOYED OR IRRITABLE: 1-SEVERAL DAYS
3. WORRYING TOO MUCH ABOUT DIFFERENT THINGS: 1-SEVERAL DAYS
GAD7 TOTAL SCORE: 5
2. NOT BEING ABLE TO STOP OR CONTROL WORRYING: 1-SEVERAL DAYS
1. FEELING NERVOUS, ANXIOUS, OR ON EDGE: 1-SEVERAL DAYS

## 2020-09-24 NOTE — TELEPHONE ENCOUNTER
Called and left message for patient to call back office. Multiple attempts were made to contact patient with no success. Will close out of telephone encounter.

## 2020-09-24 NOTE — PROGRESS NOTES
PSYCHIATRY PROGRESS NOTE      Katharina Vera  1980  09/24/2020  Face to Face time: 25 min  PCP: Precious Perera MD      ASSESSMENT:   48 yo F who struggles with poor frustration tolerance and anxiety. Fluoxetine is helping partially. Therapy would be helping in conjunction. 1. Generalized anxiety disorder  2. Unspecified depressive disorder  3. Tobacco use disorder  4. Obesity  5. hidrandenitis suppurativa, Psuedotumor cerebri    PLAN:   1. Continue diazepam 5mg BID prn, will reduce now to #40 tabs per month  2. Continue bupropion XL 150mg daily  3. Increase fluoxetine to 40mg daily  4. Recommended a self help book on meditation as also encouraged her to schedule with Dr. Raoul Garcia. I spent 18 min on psychotherapy with the patient     Medication Monitoring:    - OARRS reviewed, no issues noted      Follow-up: RTC in 4-6 weeks  Safety: RF include anxiety, hx of self harm. Pt is low risk for future dangerousness to self or others at this time   _________________________________    CC:   Chief Complaint   Patient presents with    Anxiety     S:   Continues to feel better with fluoxetine over the last couple months. She has noticed her mood is better, less anhedonia, more likely to engage in her day to day activities and chores and feels more accomplished by the end of the day. Energy is better and she is noticing it has been easier to lose weight and stick to her diet goals. She continues to struggle with feelings of guilt, feeling that she is a failure often and has failed herself and her family. Will still have generalized worries about her future, her , her son. She feels she is starting to feel less reliant on diazepam and more motivated to reduce her use of this. She would like to go down on the number of tablets she is getting per month. Main stressor is her son's health and him being overweight. She is trying to manage his diet but this often causes conflict with her . valACYclovir (VALTREX) 500 MG tablet TAKE 1 TABLET BY MOUTH TWICE DAILY 60 tablet 1    clindamycin (CLEOCIN T) 1 % external solution Apply to affected areas twice daily. 60 mL 4    acetaminophen (APAP EXTRA STRENGTH) 500 MG tablet Take 2 tablets by mouth every 6 hours as needed for Pain 120 tablet 3     No current facility-administered medications for this visit. O:  Wt Readings from Last 3 Encounters:   09/24/20 (!) 313 lb (142 kg)   03/04/20 (!) 324 lb (147 kg)   02/13/20 (!) 326 lb (147.9 kg)     Temp Readings from Last 3 Encounters:   09/24/20 97.9 °F (36.6 °C) (Temporal)   06/23/20 98.2 °F (36.8 °C) (Temporal)   05/28/20 98.9 °F (37.2 °C)     BP Readings from Last 3 Encounters:   09/24/20 119/78   03/04/20 (!) 128/100   02/13/20 124/79     Pulse Readings from Last 3 Encounters:   09/24/20 78   05/28/20 74   03/04/20 87     PHQ Scores 6/1/2020 12/12/2019 5/28/2019 8/20/2018 3/20/2018 1/22/2018 8/7/2017   PHQ2 Score 2 0 2 2 0 0 0   PHQ9 Score 2 6 10 14 5 5 1     Interpretation of Total Score Depression Severity: 1-4 = Minimal depression, 5-9 = Mild depression, 10-14 = Moderate depression, 15-19 = Moderately severe depression, 20-27 = Severe depression    BHUMI 7 SCORE 12/12/2019 5/28/2019 11/2/2018 8/17/2018 3/20/2018 1/22/2018 8/7/2017   BHUMI-7 Total Score 8 11 12 12 11 11 6     Interpretation of BHUMI-7 score: 5-9 = mild anxiety, 10-14 = moderate anxiety, 15+ = severe anxiety. Recommend referral to behavioral health for scores 10 or greater. Mental Status Exam:   Appearance    No acute distress, well dressed and groomed,   Motor: No abnormal movements, tics or mannerisms.   Speech    spontaneous, normal rate and normal volume  Mood/Affect  good /full quality good motility and range  Thought Process    linear, goal directed and coherent  Thought Content    intact , future oriented, no suicidal ideation  Associations    logical connections  Attention/Concentration    intact  Memory    recent and remote memory intact  Insight/Judgement    Good / Intact    Labs:     Office Visit on 2020   Component Date Value Ref Range Status    SARS-CoV-2, SHIN 2020 NOT DETECTED  NOT DETECTED Final    Comment: The SARS-CoV-2 assay is a real-time RT-PCR test intended for the  qualitative detection of nucleic acid from the SARS-CoV-2 in  respiratory specimens from individuals. Testing is limited to the  guidelines of FDA Emergency Use Authorization FDA for performing  SARS-CoV-2 testing. A Non-Detected result does not preclude the possibility of 2019-nCoV  infection since the adequacy of sample collection and/or low viral  burden may result in the presence of viral nucleic acids below the  analytical sensitivity of this test method. Test results should be used  with caution and in conjunction with other clinical and laboratory data  in making a diagnosis. Performed at: [address] [place] [address], [address] [place] [address]  : Manjula Renteria MD         EK17: Rate 77 bpm, Normal sinus rhythm. Minimal voltage criteria for LVH, may be normal variant. QTc 443ms.        Juliocesar Snell MD  Psychiatry

## 2020-09-28 ENCOUNTER — TELEPHONE (OUTPATIENT)
Dept: INTERNAL MEDICINE CLINIC | Age: 40
End: 2020-09-28

## 2020-10-26 ENCOUNTER — VIRTUAL VISIT (OUTPATIENT)
Dept: NEUROLOGY | Age: 40
End: 2020-10-26
Payer: COMMERCIAL

## 2020-10-26 PROCEDURE — G8427 DOCREV CUR MEDS BY ELIG CLIN: HCPCS | Performed by: PSYCHIATRY & NEUROLOGY

## 2020-10-26 PROCEDURE — 99213 OFFICE O/P EST LOW 20 MIN: CPT | Performed by: PSYCHIATRY & NEUROLOGY

## 2020-10-26 RX ORDER — ACETAZOLAMIDE 250 MG/1
TABLET ORAL
Qty: 60 TABLET | Refills: 5 | Status: SHIPPED | OUTPATIENT
Start: 2020-10-26 | End: 2022-07-14

## 2020-10-26 NOTE — PROGRESS NOTES
T  TELEHEALTH EVALUATION -- Audio/Visual (During DVBUL-94 public health emergency)    Roman Bartholomew   Neurology followup             Subjective:   CC/HP  History was obtained from the patient. Patient has known pseudotumor cerebri. Patient is doing much better after we increase her Diamox dose. She is also lost about 15 pounds of weight. She saw her ophthalmologist in August who felt that her papilledema was much better. Patient has not had any visual symptoms now recently. Her headaches are stable.   No other neurological symptoms    REVIEW OF SYSTEMS    Constitutional:  []   Chills   []  Fatigue   []  Fevers   []  Malaise   []  Weight loss     [x] Denies all of the above    Respiratory:   []  Cough    []  Shortness of breath         [x] Denies all of the above     Cardiovascular:   []  Chest pain    []  Exertional chest pressure/discomfort           [] Palpitations    []  Syncope     [x] Denies all of the above        Past Medical History:   Diagnosis Date     History  of genital herpes     Allergic rhinitis due to pollen 1/26/2012    Anxiety     Carbuncle of breast 12/22/2015    Carpal tunnel syndrome of right wrist 2/15/2016    Cervical dysplasia     LEEP X2 THEN CRYOSURGERY    Contact lens/glasses fitting     Depression     Depressive disorder, not elsewhere classified 1/26/2012    BHUMI (generalized anxiety disorder) 10/15/2018    Ganglion cyst 10/14/2015    Hidradenitis suppurativa 7/29/2016    Hypothyroid     taking synthroid 50mcg    Postpartum hypertension 2/26/2014    only during incident with kidney failure--currently not medicated for  HTN    Vulval hidradenitis suppurativa 11/19/2015     Family History   Problem Relation Age of Onset    High Blood Pressure Father     Cancer Father         pancreatic    Diabetes Father     Alcohol Abuse Father     Mental Illness Father         \"temper problems\"    Diabetes Mother     Hypertension Maternal Grandfather     Cancer Paternal Grandmother         pancreatic    Eclampsia Sister     Mental Illness Other         mother's side. details unclear    Rheum Arthritis Neg Hx     Osteoarthritis Neg Hx     Asthma Neg Hx     Breast Cancer Neg Hx     Heart Failure Neg Hx     High Cholesterol Neg Hx     Migraines Neg Hx     Ovarian Cancer Neg Hx     Rashes/Skin Problems Neg Hx     Seizures Neg Hx     Stroke Neg Hx     Thyroid Disease Neg Hx      Social History     Socioeconomic History    Marital status:      Spouse name: None    Number of children: 1    Years of education: 15    Highest education level: None   Occupational History    None   Social Needs    Financial resource strain: None    Food insecurity     Worry: None     Inability: None    Transportation needs     Medical: None     Non-medical: None   Tobacco Use    Smoking status: Current Every Day Smoker     Packs/day: 0.50     Years: 18.00     Pack years: 9.00     Types: Cigarettes    Smokeless tobacco: Never Used    Tobacco comment: states cutting down on cigarrettes; maybe 3 per day   Substance and Sexual Activity    Alcohol use:  Yes     Alcohol/week: 2.0 standard drinks     Types: 2 Standard drinks or equivalent per week     Comment: social. heavier social weekend drinking in her 19's    Drug use: Yes     Types: Marijuana     Comment: occasional use    Sexual activity: Yes     Partners: Male   Lifestyle    Physical activity     Days per week: None     Minutes per session: None    Stress: None   Relationships    Social connections     Talks on phone: None     Gets together: None     Attends Spiritism service: None     Active member of club or organization: None     Attends meetings of clubs or organizations: None     Relationship status: None    Intimate partner violence     Fear of current or ex partner: None     Emotionally abused: None     Physically abused: None     Forced sexual activity: None   Other Topics Concern    None   Social History recent eye exam by the ophthalmologist showed good improvement in papilledema    Plan :  Discussed with patient  Continue Diamox 250 mg twice daily  I refilled her prescriptions. Return in 6 months. Please note a portion of  this chart was generated using dragon dictation software. Although every effort was made to ensure the accuracy of this automated transcription, some errors in transcription may have occurred. Pursuant to the emergency declaration under the [address] [place] [address], [facility] waiver authority and the [employer] Act, this Virtual  Visit was conducted, with patient's consent, to reduce the patient's risk of exposure to COVID-19 and provide continuity of care for an established patient. Services were provided through a video synchronous discussion virtually to substitute for in-person clinic visit.

## 2020-12-04 ENCOUNTER — VIRTUAL VISIT (OUTPATIENT)
Dept: INTERNAL MEDICINE CLINIC | Age: 40
End: 2020-12-04
Payer: COMMERCIAL

## 2020-12-04 PROCEDURE — 99213 OFFICE O/P EST LOW 20 MIN: CPT | Performed by: INTERNAL MEDICINE

## 2020-12-04 PROCEDURE — G8427 DOCREV CUR MEDS BY ELIG CLIN: HCPCS | Performed by: INTERNAL MEDICINE

## 2020-12-04 ASSESSMENT — PATIENT HEALTH QUESTIONNAIRE - PHQ9
SUM OF ALL RESPONSES TO PHQ QUESTIONS 1-9: 1
1. LITTLE INTEREST OR PLEASURE IN DOING THINGS: 0
SUM OF ALL RESPONSES TO PHQ QUESTIONS 1-9: 1
2. FEELING DOWN, DEPRESSED OR HOPELESS: 1
SUM OF ALL RESPONSES TO PHQ9 QUESTIONS 1 & 2: 1
SUM OF ALL RESPONSES TO PHQ QUESTIONS 1-9: 1

## 2020-12-04 NOTE — PROGRESS NOTES
2020      TELEHEALTH EVALUATION -- Audio/Visual (During JJSER-41 public health emergency)    HPI:    Rola Mead (:  1980) has requested an audio/video evaluation for the following concern(s):    Thyroid disease follow-up. The patient is doing well. She is currently babysitting her nephew. Patient has been taking her medications as prescribed. She has no side effects noted. PHQ Scores 2020 2020 2020 2019 2019 2018 3/20/2018   PHQ2 Score 1 1 2 0 2 2 0   PHQ9 Score 1 6 2 6 10 14 5     Interpretation of Total Score Depression Severity: 1-4 = Minimal depression, 5-9 = Mild depression, 10-14 = Moderate depression, 15-19 = Moderately severe depression, 20-27 = Severe depression    On the basis of positive PHQ-9 screening (PHQ-9 Total Score: 1), the following plan was implemented: additional evaluation and assessment performed. Patient will follow-up in 6 month(s) with PCP. Review of Systems    Prior to Visit Medications    Medication Sig Taking? Authorizing Provider   acetaZOLAMIDE (DIAMOX) 250 MG tablet TAKE ONE TABLET BY MOUTH TWICE A DAY Yes Yola Gerard MD   diazePAM (VALIUM) 5 MG tablet Take 1 tablet by mouth 2 times daily as needed for Anxiety for up to 60 days.  Do not fill before 10/14/2020 Yes Mayela Lorenz MD   FLUoxetine (PROZAC) 20 MG capsule Take 2 capsules by mouth daily Yes Nikolay Marte MD   levothyroxine (SYNTHROID) 125 MCG tablet TAKE ONE TABLET BY MOUTH DAILY Yes Cooper Post MD   spironolactone (ALDACTONE) 25 MG tablet TAKE ONE TABLET BY MOUTH DAILY Yes Cooper Post MD   buPROPion (WELLBUTRIN XL) 150 MG extended release tablet Take 1 tablet by mouth every morning Yes Nikolay Marte MD   albuterol sulfate HFA (PROVENTIL HFA) 108 (90 Base) MCG/ACT inhaler Inhale 2 puffs into the lungs every 6 hours as needed for Wheezing Yes Cooper Post MD   valACYclovir (VALTREX) 500 MG tablet TAKE 1 TABLET BY MOUTH TWICE DAILY Yes Lizzeth KUHN Carisa Yan MD   clindamycin (CLEOCIN T) 1 % external solution Apply to affected areas twice daily. Yes Rey Merchant MD   acetaminophen (APAP EXTRA STRENGTH) 500 MG tablet Take 2 tablets by mouth every 6 hours as needed for Pain Yes Gabrielle Bustillos MD       Social History     Tobacco Use    Smoking status: Current Every Day Smoker     Packs/day: 0.50     Years: 18.00     Pack years: 9.00     Types: Cigarettes    Smokeless tobacco: Never Used    Tobacco comment: states cutting down on cigarrettes; maybe 3 per day   Substance Use Topics    Alcohol use: Yes     Alcohol/week: 2.0 standard drinks     Types: 2 Standard drinks or equivalent per week     Comment: social. heavier social weekend drinking in her 21's    Drug use: Yes     Types: Marijuana     Comment: occasional use          PHYSICAL EXAMINATION:  There were no vitals filed for this visit. Physical Exam  Vitals signs reviewed. Constitutional:       General: She is not in acute distress. Appearance: Normal appearance. She is well-developed. She is not diaphoretic. HENT:      Head: Normocephalic and atraumatic. Eyes:      Extraocular Movements: Extraocular movements intact. Conjunctiva/sclera: Conjunctivae normal.      Pupils: Pupils are equal, round, and reactive to light. Pulmonary:      Effort: Pulmonary effort is normal.   Neurological:      General: No focal deficit present. Mental Status: She is alert and oriented to person, place, and time. Cranial Nerves: No cranial nerve deficit. Psychiatric:         Mood and Affect: Mood normal.         Behavior: Behavior normal.         Thought Content: Thought content normal.         Judgment: Judgment normal.         ASSESSMENT/PLAN:  Assessment/Plan:  Zina Martins was seen today for hypothyroidism. Diagnoses and all orders for this visit:    Morbid obesity ([address]  Comments:  On Wellbutrin.     Tobacco use disorder  Comments:  Smoking cessation through use of Wellbutrin. Hypothyroidism (acquired)  -     T4, Free; Future  -     TSH without Reflex; Future        No follow-ups on file. Yoseph Babb is a 47 y.o. female being evaluated by a Virtual Visit (video visit) encounter to address concerns as mentioned above. A caregiver was present when appropriate. Due to this being a TeleHealth encounter (During [facility] public [facility] emergency), evaluation of the following organ systems was limited: Vitals/Constitutional/EENT/Resp/CV/GI//MS/Neuro/Skin/Heme-Lymph-Imm. Pursuant to the emergency declaration under the [address] [place] [address], [address] [place] [address] authority and the [employer] Act, this Virtual Visit was conducted with patient's (and/or legal guardian's) consent, to reduce the patient's risk of exposure to COVID-19 and provide necessary medical care. The patient (and/or legal guardian) has also been advised to contact this office for worsening conditions or problems, and seek emergency medical treatment and/or call 911 if deemed necessary. Patient identification was verified at the start of the visit: Yes    Total time spent on this encounter: Not billed by time    Services were provided through a video synchronous discussion virtually to substitute for in-person clinic visit. Patient and provider were located at their individual homes. --Destiny Blanton MD on 12/4/2020 at 8:55 AM    An electronic signature was used to authenticate this note.

## 2020-12-17 ENCOUNTER — PATIENT MESSAGE (OUTPATIENT)
Dept: PSYCHIATRY | Age: 40
End: 2020-12-17

## 2020-12-17 RX ORDER — DIAZEPAM 5 MG/1
5 TABLET ORAL 2 TIMES DAILY PRN
Qty: 40 TABLET | Refills: 0 | Status: SHIPPED | OUTPATIENT
Start: 2020-12-17 | End: 2020-12-22 | Stop reason: SDUPTHER

## 2020-12-17 NOTE — TELEPHONE ENCOUNTER
From: Elier Bell  To: Emir Coyle MD  Sent: 12/17/2020 9:47 AM EST  Subject: Prescription Question    Good morning Dr. Miguel Zambrano,  Would you be able to send a refill for my diazepam into the pharmacy in [facility]? Im out, and the soonest available appointment was this Tuesday at [address]. Thank you for your time, I'll see you soon.   Placido Avila

## 2020-12-22 ENCOUNTER — VIRTUAL VISIT (OUTPATIENT)
Dept: PSYCHIATRY | Age: 40
End: 2020-12-22
Payer: COMMERCIAL

## 2020-12-22 PROCEDURE — 90833 PSYTX W PT W E/M 30 MIN: CPT | Performed by: PSYCHIATRY & NEUROLOGY

## 2020-12-22 PROCEDURE — 99213 OFFICE O/P EST LOW 20 MIN: CPT | Performed by: PSYCHIATRY & NEUROLOGY

## 2020-12-22 RX ORDER — FLUOXETINE HYDROCHLORIDE 20 MG/1
40 CAPSULE ORAL DAILY
Qty: 60 CAPSULE | Refills: 5 | Status: SHIPPED | OUTPATIENT
Start: 2020-12-22 | End: 2021-04-01 | Stop reason: SDUPTHER

## 2020-12-22 RX ORDER — DIAZEPAM 5 MG/1
5 TABLET ORAL 2 TIMES DAILY PRN
Qty: 40 TABLET | Refills: 0 | Status: SHIPPED | OUTPATIENT
Start: 2021-01-16 | End: 2021-01-26 | Stop reason: SDUPTHER

## 2020-12-22 NOTE — PROGRESS NOTES
THERAPY MODALITIES: psychodynamic  THERAPY NOTES: explored childhood in regards to father's behavior and how this impacted her. Also discussed how parents financial struggles impacted her and how this has contributed to anxiety. Discussed her reaction with [place]. Discussed benefits of setting boundaries and being mindful of her reactions and cognitive strategies to help her with her reactions when they occur. ROS: No cp, no palpitations, no headaches, no tremors, no diaphoresis, no abd pain, no n/v/d. Has had some weight loss    Brief Medical Hx:   Pseudotumor cerebri, Hypothyroidism, morbid obesity, hidrandenitis suppurativa    Brief Psych Hx:  Med trials: lexapro, celexa, paxil (could not tolerate any of these, nausea), duloxetine (did not tolerate). Did take sertraline, not sure how long or what dose, but she did not have side effects. Been on diazepam 5mg BID for over 10 yrs  NSSI: did cut self 4 times in late teens, but denies intent to end life    Current Outpatient Medications   Medication Sig Dispense Refill    [START ON 1/16/2021] diazePAM (VALIUM) 5 MG tablet Take 1 tablet by mouth 2 times daily as needed for Anxiety for up to 30 days.  Do not fill before 1/16/2021 40 tablet 0    acetaZOLAMIDE (DIAMOX) 250 MG tablet TAKE ONE TABLET BY MOUTH TWICE A DAY 60 tablet 5    FLUoxetine (PROZAC) 20 MG capsule Take 2 capsules by mouth daily 60 capsule 2    levothyroxine (SYNTHROID) 125 MCG tablet TAKE ONE TABLET BY MOUTH DAILY 30 tablet 5    spironolactone (ALDACTONE) 25 MG tablet TAKE ONE TABLET BY MOUTH DAILY 30 tablet 5    buPROPion (WELLBUTRIN XL) 150 MG extended release tablet Take 1 tablet by mouth every morning 30 tablet 3    albuterol sulfate HFA (PROVENTIL HFA) 108 (90 Base) MCG/ACT inhaler Inhale 2 puffs into the lungs every 6 hours as needed for Wheezing 1 Inhaler 3    valACYclovir (VALTREX) 500 MG tablet TAKE 1 TABLET BY MOUTH TWICE DAILY 60 tablet 1  clindamycin (CLEOCIN T) 1 % external solution Apply to affected areas twice daily. 60 mL 4    acetaminophen (APAP EXTRA STRENGTH) 500 MG tablet Take 2 tablets by mouth every 6 hours as needed for Pain 120 tablet 3     No current facility-administered medications for this visit. O:  Wt Readings from Last 3 Encounters:   09/24/20 (!) 313 lb (142 kg)   03/04/20 (!) 324 lb (147 kg)   02/13/20 (!) 326 lb (147.9 kg)     Temp Readings from Last 3 Encounters:   09/24/20 97.9 °F (36.6 °C) (Temporal)   06/23/20 98.2 °F (36.8 °C) (Temporal)   05/28/20 98.9 °F (37.2 °C)     BP Readings from Last 3 Encounters:   09/24/20 119/78   03/04/20 (!) 128/100   02/13/20 124/79     Pulse Readings from Last 3 Encounters:   09/24/20 78   05/28/20 74   03/04/20 87     PHQ Scores 12/4/2020 9/24/2020 6/1/2020 12/12/2019 5/28/2019 8/20/2018 3/20/2018   PHQ2 Score 1 1 2 0 2 2 0   PHQ9 Score 1 6 2 6 10 14 5     Interpretation of Total Score Depression Severity: 1-4 = Minimal depression, 5-9 = Mild depression, 10-14 = Moderate depression, 15-19 = Moderately severe depression, 20-27 = Severe depression    BHUMI 7 SCORE 9/24/2020 12/12/2019 5/28/2019 11/2/2018 8/17/2018 3/20/2018 1/22/2018   BHUMI-7 Total Score 5 8 11 12 12 11 11     Interpretation of BHUMI-7 score: 5-9 = mild anxiety, 10-14 = moderate anxiety, 15+ = severe anxiety. Recommend referral to behavioral health for scores 10 or greater. Mental Status Exam:   Appearance    No acute distress, well dressed and groomed,   Motor: No abnormal movements, tics or mannerisms.   Speech    spontaneous, normal rate and normal volume  Mood/Affect  good /full quality good motility and range  Thought Process    linear, goal directed and coherent  Thought Content    intact , future oriented, no suicidal ideation  Associations    logical connections  Attention/Concentration    intact  Memory    recent and remote memory intact  Insight/Judgement    Good / Intact    Labs: Office Visit on 2020   Component Date Value Ref Range Status    SARS-CoV-2, SHIN 2020 NOT DETECTED  NOT DETECTED Final    Comment: The SARS-CoV-2 assay is a real-time RT-PCR test intended for the  qualitative detection of nucleic acid from the SARS-CoV-2 in  respiratory specimens from individuals. Testing is limited to the  guidelines of FDA Emergency Use Authorization FDA for performing  SARS-CoV-2 testing. A Non-Detected result does not preclude the possibility of 2019-nCoV  infection since the adequacy of sample collection and/or low viral  burden may result in the presence of viral nucleic acids below the  analytical sensitivity of this test method. Test results should be used  with caution and in conjunction with other clinical and laboratory data  in making a diagnosis. Performed at: [address] [place] [address], [address] [place] [address]  : Alicia Huff MD         EK17: Rate 77 bpm, Normal sinus rhythm. Minimal voltage criteria for LVH, may be normal variant. QTc 443ms.        Anmol Glaser MD  Psychiatry

## 2021-01-26 ENCOUNTER — OFFICE VISIT (OUTPATIENT)
Dept: PSYCHIATRY | Age: 41
End: 2021-01-26
Payer: COMMERCIAL

## 2021-01-26 VITALS
BODY MASS INDEX: 44.41 KG/M2 | HEIGHT: 68 IN | DIASTOLIC BLOOD PRESSURE: 72 MMHG | HEART RATE: 86 BPM | SYSTOLIC BLOOD PRESSURE: 120 MMHG | WEIGHT: 293 LBS | OXYGEN SATURATION: 97 % | TEMPERATURE: 97.6 F

## 2021-01-26 DIAGNOSIS — F41.1 GENERALIZED ANXIETY DISORDER: ICD-10-CM

## 2021-01-26 DIAGNOSIS — F41.1 GAD (GENERALIZED ANXIETY DISORDER): Primary | ICD-10-CM

## 2021-01-26 PROCEDURE — G8417 CALC BMI ABV UP PARAM F/U: HCPCS | Performed by: PSYCHIATRY & NEUROLOGY

## 2021-01-26 PROCEDURE — 4004F PT TOBACCO SCREEN RCVD TLK: CPT | Performed by: PSYCHIATRY & NEUROLOGY

## 2021-01-26 PROCEDURE — G8427 DOCREV CUR MEDS BY ELIG CLIN: HCPCS | Performed by: PSYCHIATRY & NEUROLOGY

## 2021-01-26 PROCEDURE — G8484 FLU IMMUNIZE NO ADMIN: HCPCS | Performed by: PSYCHIATRY & NEUROLOGY

## 2021-01-26 PROCEDURE — 90833 PSYTX W PT W E/M 30 MIN: CPT | Performed by: PSYCHIATRY & NEUROLOGY

## 2021-01-26 PROCEDURE — 99213 OFFICE O/P EST LOW 20 MIN: CPT | Performed by: PSYCHIATRY & NEUROLOGY

## 2021-01-26 RX ORDER — DIAZEPAM 5 MG/1
5 TABLET ORAL 2 TIMES DAILY PRN
Qty: 40 TABLET | Refills: 1 | Status: SHIPPED | OUTPATIENT
Start: 2021-02-15 | End: 2021-04-01 | Stop reason: SDUPTHER

## 2021-01-26 NOTE — PROGRESS NOTES
PSYCHIATRY PROGRESS NOTE        Margareth Duncan  1980  1/26/2021  PCP: Ashlee Pascal MD      CC:   Chief Complaint   Patient presents with    Follow-up     S: Pt reports some improvement with her mood and anxiety. She feels the medication has helped her stay present and in the moment more, and less likely to start worrying about things she can't control. However she continues to struggle with periods of thinking negatively of herself, having low self esteem, feeling like a failure, this is better than it has been in the past.   Can still struggle with periods of anger, frustration. Feels reliant on diazepam  for this as well as for her acute anxiety. Very fearful of reducing it further although she recognizes that it is preventing her from addressing core issues. Still with periods of suicidal ideation - feels it is a reflex thought in times of distress. This has been a chronic issue. Thinking about her son helps the thoughts go away. She feels overall this is becoming better. 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 She also applied for a job at [employer] that she feels she would be well suited for and really enjoy. She is starting to think again about getting into higher education to progress her career. THERAPY GOALS: reduce anxiety, improve self esteem  THERAPY MODALITIES: CBT  THERAPY NOTES: discussed cognitive distortions. Highlighted changes patient has made in her outlook and behaviors seemingly as a result of some improvement in negative self thinking. Discussed benefits of evaluating automatic thoughts and cognitive distortions to avoid suicidal thinking. ROS: some weight gain.      Brief Medical Hx:   Pseudotumor cerebri, Hypothyroidism, morbid obesity, hidrandenitis suppurativa    Brief Psych Hx: Med trials: lexapro, celexa, paxil (could not tolerate any of these, nausea), duloxetine (did not tolerate). Did take sertraline, not sure how long or what dose, but she did not have side effects. Been on diazepam 5mg BID for over 10 yrs  NSSI: did cut self 4 times in late teens, but denies intent to end life    Current Outpatient Medications   Medication Sig Dispense Refill    diazePAM (VALIUM) 5 MG tablet Take 1 tablet by mouth 2 times daily as needed for Anxiety for up to 30 days. Do not fill before 1/16/2021 40 tablet 0    FLUoxetine (PROZAC) 20 MG capsule Take 2 capsules by mouth daily 60 capsule 5    acetaZOLAMIDE (DIAMOX) 250 MG tablet TAKE ONE TABLET BY MOUTH TWICE A DAY 60 tablet 5    levothyroxine (SYNTHROID) 125 MCG tablet TAKE ONE TABLET BY MOUTH DAILY 30 tablet 5    spironolactone (ALDACTONE) 25 MG tablet TAKE ONE TABLET BY MOUTH DAILY 30 tablet 5    buPROPion (WELLBUTRIN XL) 150 MG extended release tablet Take 1 tablet by mouth every morning 30 tablet 3    albuterol sulfate HFA (PROVENTIL HFA) 108 (90 Base) MCG/ACT inhaler Inhale 2 puffs into the lungs every 6 hours as needed for Wheezing 1 Inhaler 3    valACYclovir (VALTREX) 500 MG tablet TAKE 1 TABLET BY MOUTH TWICE DAILY 60 tablet 1    clindamycin (CLEOCIN T) 1 % external solution Apply to affected areas twice daily. 60 mL 4    acetaminophen (APAP EXTRA STRENGTH) 500 MG tablet Take 2 tablets by mouth every 6 hours as needed for Pain 120 tablet 3     No current facility-administered medications for this visit.         O:  Wt Readings from Last 3 Encounters:   01/26/21 (!) 317 lb (143.8 kg)   09/24/20 (!) 313 lb (142 kg)   03/04/20 (!) 324 lb (147 kg)     Temp Readings from Last 3 Encounters:   01/26/21 97.6 °F (36.4 °C) (Infrared)   09/24/20 97.9 °F (36.6 °C) (Temporal)   06/23/20 98.2 °F (36.8 °C) (Temporal)     BP Readings from Last 3 Encounters:   01/26/21 120/72   09/24/20 119/78   03/04/20 (!) 128/100 Pulse Readings from Last 3 Encounters:   01/26/21 86   09/24/20 78   05/28/20 74     Mental Status Exam:   Appearance    No acute distress, well dressed and groomed,   Motor: No abnormal movements, tics or mannerisms. Speech    spontaneous, normal rate and normal volume  Mood/Affect  ok /full quality good motility and range  Thought Process    linear, goal directed and coherent  Thought Content  future oriented, occ SI w/o intent or plan   Associations    logical connections  Attention/Concentration    intact  Memory    recent and remote memory intact  Insight/Judgement    Good / Intact    Labs:     Lab Results   Component Value Date    LABA1C 5.3 10/23/2019     Lab Results   Component Value Date    .4 10/23/2019     Lab Results   Component Value Date    CHOL 186 10/23/2019    CHOL 200 (H) 07/29/2015    CHOL 202 (H) 09/19/2012     Lab Results   Component Value Date    TRIG 95 10/23/2019    TRIG 226 (H) 07/29/2015    TRIG 96 09/19/2012     Lab Results   Component Value Date    HDL 56 10/23/2019    HDL 67 (H) 07/29/2015    HDL 70 (H) 09/19/2012     Lab Results   Component Value Date    LDLCALC 111 (H) 10/23/2019    LDLCALC 88 07/29/2015    LDLCALC 113 (H) 09/19/2012     Lab Results   Component Value Date    LABVLDL 19 10/23/2019    LABVLDL 45 07/29/2015    LABVLDL 19 09/19/2012     No results found for: CHOLHDLRATIO      ASSESSMENT:   48 yo F who struggles with poor frustration tolerance, depression, and anxiety. Fluoxetine is helping partially. Therapy would continue to be helpful. Tapering of benzodiazepines are a goal. Adjunctive medication options may be helpful alternatives. 1. Generalized anxiety disorder  2. Unspecified depressive disorder  3. Tobacco use disorder  4. Obesity  5. hidrandenitis suppurativa, Psuedotumor cerebri    PLAN:   1. Continue diazepam 5mg BID prn,  #40 tabs per month. Future goal is to taper further  2. Continue bupropion XL 150mg daily  3.  Continue fluoxetine 40mg daily 4. Provided CBT handout on cognitive distortions    I spent 20 min on psychotherapy with the patient     Medication Monitoring:    - OARRS reviewed, no issues noted      Follow-up: RTC in 1 month      Pernell Steiner MD  Psychiatry

## 2021-02-21 DIAGNOSIS — F41.1 GENERALIZED ANXIETY DISORDER: ICD-10-CM

## 2021-02-22 RX ORDER — BUPROPION HYDROCHLORIDE 150 MG/1
TABLET ORAL
Qty: 30 TABLET | Refills: 2 | Status: SHIPPED | OUTPATIENT
Start: 2021-02-22 | End: 2021-04-01 | Stop reason: SDUPTHER

## 2021-02-25 ENCOUNTER — VIRTUAL VISIT (OUTPATIENT)
Dept: PSYCHIATRY | Age: 41
End: 2021-02-25
Payer: COMMERCIAL

## 2021-02-25 DIAGNOSIS — F41.1 GAD (GENERALIZED ANXIETY DISORDER): Primary | ICD-10-CM

## 2021-02-25 DIAGNOSIS — E66.01 MORBID OBESITY (HCC): Chronic | ICD-10-CM

## 2021-02-25 DIAGNOSIS — F32.A DEPRESSION, UNSPECIFIED DEPRESSION TYPE: ICD-10-CM

## 2021-02-25 DIAGNOSIS — G47.10 HYPERSOMNIA: ICD-10-CM

## 2021-02-25 PROCEDURE — 99214 OFFICE O/P EST MOD 30 MIN: CPT | Performed by: PSYCHIATRY & NEUROLOGY

## 2021-02-25 NOTE — PROGRESS NOTES
PSYCHIATRY PROGRESS NOTE  Telehealth visit      Jorge Kay  1980  2/25/2021  Provider location: Office  Patient location: Home  PCP: Pipe Phillips MD      CC:   Chief Complaint   Patient presents with    Depression     S: This virtual visit was conducted with the patient's consent. Services were provided through video using Doxy. me software. Today we discussed patient's longstanding struggles with fatigue and loss of motivation. On days off she has a hard time motivating herself to get things done around the home that she feels is necessary. She finds herself sleeping excessively. Even when she is working she feels the need to fall asleep and doze off throughout the entire day. When she does sleep she does not feel well rested. These things affect her mood as she feels she is not able to accomplish all the things she wants to. In particular, she does not have the energy and drive to exercise despite the motivation to lose some weight. Her excessive weight is a big issue for her and affects her emotionally and impacts her self-confidence. Review of records indicates that there was some suspicion of sleep apnea back in 2018 per Dr. Blanca Valadez. He also had diagnosed pulmonary HTN. She did have an echocardiogram in 2014 that showed some tricuspid regurgitation and elevated right ventricular systolic pressure of 42 mmHg. An overnight pulse oximetry was ordered although the patient does not recall what resulted from that test.  She has not seen a sleep doctor or been evaluated for sleep apnea in the past.    In addition to hypersomnia, and excessive daytime fatigue, patient reports very excessive and loud snoring. She wakes up multiple times in the night at times feeling short of breath or coughing.   She often finds herself using 5 mg of diazepam at nighttime to help her go to sleep as it does help calm her anxiety, however she notes that she really does not have issues falling asleep due to her excessive fatigue. She does feel clouded with her thinking. ROS: Positive for fatigue, snoring    Brief Medical Hx:   Pseudotumor cerebri, Hypothyroidism, morbid obesity, hidrandenitis suppurativa    Brief Psych Hx:  Med trials: lexapro, celexa, paxil (could not tolerate any of these, nausea), duloxetine (did not tolerate). Did take sertraline, not sure how long or what dose, but she did not have side effects. Been on diazepam 5mg BID for over 10 yrs  NSSI: did cut self 4 times in late teens, but denies intent to end life    Current Outpatient Medications   Medication Sig Dispense Refill    buPROPion (WELLBUTRIN XL) 150 MG extended release tablet TAKE ONE TABLET BY MOUTH EVERY MORNING 30 tablet 2    spironolactone (ALDACTONE) 25 MG tablet TAKE ONE TABLET BY MOUTH DAILY 30 tablet 2    levothyroxine (SYNTHROID) 125 MCG tablet TAKE ONE TABLET BY MOUTH DAILY 30 tablet 2    diazePAM (VALIUM) 5 MG tablet Take 1 tablet by mouth 2 times daily as needed for Anxiety for up to 60 days. Do not fill before 2/15/2021 40 tablet 1    FLUoxetine (PROZAC) 20 MG capsule Take 2 capsules by mouth daily 60 capsule 5    acetaZOLAMIDE (DIAMOX) 250 MG tablet TAKE ONE TABLET BY MOUTH TWICE A DAY 60 tablet 5    albuterol sulfate HFA (PROVENTIL HFA) 108 (90 Base) MCG/ACT inhaler Inhale 2 puffs into the lungs every 6 hours as needed for Wheezing 1 Inhaler 3    valACYclovir (VALTREX) 500 MG tablet TAKE 1 TABLET BY MOUTH TWICE DAILY 60 tablet 1    clindamycin (CLEOCIN T) 1 % external solution Apply to affected areas twice daily. 60 mL 4    acetaminophen (APAP EXTRA STRENGTH) 500 MG tablet Take 2 tablets by mouth every 6 hours as needed for Pain 120 tablet 3     No current facility-administered medications for this visit.         O:  Wt Readings from Last 3 Encounters:   01/26/21 (!) 317 lb (143.8 kg)   09/24/20 (!) 313 lb (142 kg)   03/04/20 (!) 324 lb (147 kg)     Temp Readings from Last 3 Encounters:   01/26/21 97.6 °F (36.4 °C) (Infrared)   09/24/20 97.9 °F (36.6 °C) (Temporal)   06/23/20 98.2 °F (36.8 °C) (Temporal)     BP Readings from Last 3 Encounters:   01/26/21 120/72   09/24/20 119/78   03/04/20 (!) 128/100     Pulse Readings from Last 3 Encounters:   01/26/21 86   09/24/20 78   05/28/20 74     Mental Status Exam:   Appearance    No acute distress, well dressed and groomed,   Motor: No abnormal movements, tics or mannerisms. Speech    spontaneous, normal rate and normal volume  Mood/Affect  ok /full quality good motility and range  Thought Process    linear, goal directed and coherent  Thought Content  future oriented, no SI, intent or plan  Associations    logical connections  Attention/Concentration    intact  Memory    recent and remote memory intact  Insight/Judgement    Good / Intact    Labs:     Lab Results   Component Value Date    LABA1C 5.3 10/23/2019     Lab Results   Component Value Date    .4 10/23/2019     Lab Results   Component Value Date    CHOL 186 10/23/2019    CHOL 200 (H) 07/29/2015    CHOL 202 (H) 09/19/2012     Lab Results   Component Value Date    TRIG 95 10/23/2019    TRIG 226 (H) 07/29/2015    TRIG 96 09/19/2012     Lab Results   Component Value Date    HDL 56 10/23/2019    HDL 67 (H) 07/29/2015    HDL 70 (H) 09/19/2012     Lab Results   Component Value Date    LDLCALC 111 (H) 10/23/2019    LDLCALC 88 07/29/2015    LDLCALC 113 (H) 09/19/2012     Lab Results   Component Value Date    LABVLDL 19 10/23/2019    LABVLDL 45 07/29/2015    LABVLDL 19 09/19/2012     No results found for: CHOLHDLRATIO      ASSESSMENT:   48 yo F who struggles with poor frustration tolerance, depression, and anxiety. Fluoxetine is helping partially. Therapy would continue to be helpful. Tapering of benzodiazepines are a goal.  Upon evaluation today it seems she may have pretty severe sleep apnea which may be a longstanding issue. There is a past history of pulmonary HTN per chart records.   She has not previously been evaluated

## 2021-04-01 ENCOUNTER — VIRTUAL VISIT (OUTPATIENT)
Dept: PSYCHIATRY | Age: 41
End: 2021-04-01
Payer: COMMERCIAL

## 2021-04-01 DIAGNOSIS — F41.1 GENERALIZED ANXIETY DISORDER: ICD-10-CM

## 2021-04-01 DIAGNOSIS — F32.A DEPRESSION, UNSPECIFIED DEPRESSION TYPE: Primary | ICD-10-CM

## 2021-04-01 PROCEDURE — G8427 DOCREV CUR MEDS BY ELIG CLIN: HCPCS | Performed by: PSYCHIATRY & NEUROLOGY

## 2021-04-01 PROCEDURE — 99214 OFFICE O/P EST MOD 30 MIN: CPT | Performed by: PSYCHIATRY & NEUROLOGY

## 2021-04-01 RX ORDER — DIAZEPAM 5 MG/1
5 TABLET ORAL 2 TIMES DAILY PRN
Qty: 37 TABLET | Refills: 1 | Status: SHIPPED | OUTPATIENT
Start: 2021-04-21 | End: 2021-06-03 | Stop reason: SDUPTHER

## 2021-04-01 RX ORDER — BUPROPION HYDROCHLORIDE 150 MG/1
TABLET ORAL
Qty: 30 TABLET | Refills: 2 | Status: SHIPPED | OUTPATIENT
Start: 2021-04-01 | End: 2021-06-03 | Stop reason: SDUPTHER

## 2021-04-01 RX ORDER — FLUOXETINE HYDROCHLORIDE 20 MG/1
40 CAPSULE ORAL DAILY
Qty: 60 CAPSULE | Refills: 5 | Status: SHIPPED | OUTPATIENT
Start: 2021-04-01 | End: 2022-01-03

## 2021-04-01 NOTE — PROGRESS NOTES
PSYCHIATRY PROGRESS NOTE  Telehealth visit      Usman Lozada  1980  4/1/2021  Provider location: Office  Patient location: Home  PCP: Rell Barber MD      CC:   Chief Complaint   Patient presents with    Follow-up     S: This virtual visit was conducted with the patient's consent. Services were provided through video using Doxy. me software. Patient is overall fairly stable with her mood and anxiety. Continues to have periods of high anxiety, high reactivity at work. Triggered by her supervisor who can be difficult. 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  She is setting boundaries in regards to her time and respecting that she does not have to take on more tasks than she needs to in order to compromise her work hours. However her job is continually more demanding and she is noticing more more responsibilities put on her, which has made it more difficult. She is looking for alternative jobs actively that may provide for a better work environment for her. Continues to struggle with a lot of daytime fatigue, not waking up rested. Did schedule with Dr. Ki Gr and is looking forward to discussing the possibility of FROY. Is agreeable to work towards using less diazepam slowly. We discussed using alternative medications for her irritability, but she would like to work towards using less medication and not use any alternatives. ROS: Positive for fatigue    Brief Medical Hx:   Pseudotumor cerebri, Hypothyroidism, morbid obesity, hidrandenitis suppurativa    Brief Psych Hx:  Med trials: lexapro, celexa, paxil (could not tolerate any of these, nausea), duloxetine (did not tolerate). Did take sertraline, not sure how long or what dose, but she did not have side effects.  Been on diazepam 5mg BID for over 10 yrs  NSSI: did cut self 4 times in late teens, but denies intent to end life    Current Outpatient Medications   Medication Sig Dispense Refill    [START ON 4/21/2021] diazePAM (VALIUM) 5 MG tablet Take 1 tablet by mouth 2 times daily as needed for Anxiety for up to 60 days. Do not fill before 4/21/2021 37 tablet 1    buPROPion (WELLBUTRIN XL) 150 MG extended release tablet TAKE ONE TABLET BY MOUTH EVERY MORNING 30 tablet 2    FLUoxetine (PROZAC) 20 MG capsule Take 2 capsules by mouth daily 60 capsule 5    spironolactone (ALDACTONE) 25 MG tablet TAKE ONE TABLET BY MOUTH DAILY 30 tablet 2    levothyroxine (SYNTHROID) 125 MCG tablet TAKE ONE TABLET BY MOUTH DAILY 30 tablet 2    acetaZOLAMIDE (DIAMOX) 250 MG tablet TAKE ONE TABLET BY MOUTH TWICE A DAY 60 tablet 5    clindamycin (CLEOCIN T) 1 % external solution Apply to affected areas twice daily. 60 mL 4    acetaminophen (APAP EXTRA STRENGTH) 500 MG tablet Take 2 tablets by mouth every 6 hours as needed for Pain 120 tablet 3    albuterol sulfate HFA (PROVENTIL HFA) 108 (90 Base) MCG/ACT inhaler Inhale 2 puffs into the lungs every 6 hours as needed for Wheezing (Patient not taking: Reported on 4/1/2021) 1 Inhaler 3    valACYclovir (VALTREX) 500 MG tablet TAKE 1 TABLET BY MOUTH TWICE DAILY (Patient not taking: Reported on 4/1/2021) 60 tablet 1     No current facility-administered medications for this visit. O:  Wt Readings from Last 3 Encounters:   01/26/21 (!) 317 lb (143.8 kg)   09/24/20 (!) 313 lb (142 kg)   03/04/20 (!) 324 lb (147 kg)     Temp Readings from Last 3 Encounters:   01/26/21 97.6 °F (36.4 °C) (Infrared)   09/24/20 97.9 °F (36.6 °C) (Temporal)   06/23/20 98.2 °F (36.8 °C) (Temporal)     BP Readings from Last 3 Encounters:   01/26/21 120/72   09/24/20 119/78   03/04/20 (!) 128/100     Pulse Readings from Last 3 Encounters:   01/26/21 86   09/24/20 78   05/28/20 74     Mental Status Exam:   Appearance    No acute distress, well dressed and groomed,   Motor: No abnormal movements, tics or mannerisms.   Speech    spontaneous, normal rate and normal volume  Mood/Affect  ok /full quality good motility and range  Thought Process    linear, goal directed and coherent  Thought Content  future oriented, no SI, intent or plan  Associations    logical connections  Attention/Concentration    intact  Memory    recent and remote memory intact  Insight/Judgement    Good / Intact    Labs:     Lab Results   Component Value Date    LABA1C 5.3 10/23/2019     Lab Results   Component Value Date    .4 10/23/2019     Lab Results   Component Value Date    CHOL 186 10/23/2019    CHOL 200 (H) 07/29/2015    CHOL 202 (H) 09/19/2012     Lab Results   Component Value Date    TRIG 95 10/23/2019    TRIG 226 (H) 07/29/2015    TRIG 96 09/19/2012     Lab Results   Component Value Date    HDL 56 10/23/2019    HDL 67 (H) 07/29/2015    HDL 70 (H) 09/19/2012     Lab Results   Component Value Date    LDLCALC 111 (H) 10/23/2019    LDLCALC 88 07/29/2015    LDLCALC 113 (H) 09/19/2012     Lab Results   Component Value Date    LABVLDL 19 10/23/2019    LABVLDL 45 07/29/2015    LABVLDL 19 09/19/2012     No results found for: CHOLHDLRATIO      ASSESSMENT:   46 yo F who struggles with poor frustration tolerance, depression, and anxiety. She is fairly stable, continues to work on managing her interpersonal reactivity. Diazepam tapering and addressing possible underlying FROY will only help her be in more control of her emotional states going forward. She is also looking to find the type of work environment that she is best suited for. 1. Generalized anxiety disorder  2. Unspecified depressive disorder  3. Tobacco use disorder  4. Obesity  5. Hypersomnia, rule out obstructive sleep apnea. 6.  History of pulmonary HTN    PLAN:   1. Continue diazepam 5 mg twice daily as needed anxiety, will lower to #37 tabs per month. 2. Continue bupropion XL 150mg daily  3. Continue fluoxetine 40mg daily  4.   Follow through with initial visit with Dr. Gabrielle Dixon next month.       Medication Monitoring:    - OARRS reviewed, no issues noted      Follow-up: RTC in 2 months    I spent 30 minutes on this encounter including chart review, face-to-face time with the patient, documentation, and orders.     Mikel Anglin MD  Psychiatry

## 2021-05-04 ENCOUNTER — OFFICE VISIT (OUTPATIENT)
Dept: PULMONOLOGY | Age: 41
End: 2021-05-04
Payer: COMMERCIAL

## 2021-05-04 VITALS
SYSTOLIC BLOOD PRESSURE: 122 MMHG | OXYGEN SATURATION: 98 % | BODY MASS INDEX: 44.41 KG/M2 | HEIGHT: 68 IN | WEIGHT: 293 LBS | HEART RATE: 64 BPM | DIASTOLIC BLOOD PRESSURE: 84 MMHG

## 2021-05-04 DIAGNOSIS — E03.9 HYPOTHYROIDISM (ACQUIRED): Chronic | ICD-10-CM

## 2021-05-04 DIAGNOSIS — G47.10 HYPERSOMNIA: Primary | ICD-10-CM

## 2021-05-04 DIAGNOSIS — E66.01 CLASS 3 SEVERE OBESITY DUE TO EXCESS CALORIES WITH SERIOUS COMORBIDITY AND BODY MASS INDEX (BMI) OF 45.0 TO 49.9 IN ADULT (HCC): Chronic | ICD-10-CM

## 2021-05-04 DIAGNOSIS — R06.83 SNORING: ICD-10-CM

## 2021-05-04 DIAGNOSIS — F41.1 GAD (GENERALIZED ANXIETY DISORDER): Chronic | ICD-10-CM

## 2021-05-04 PROCEDURE — 99244 OFF/OP CNSLTJ NEW/EST MOD 40: CPT | Performed by: INTERNAL MEDICINE

## 2021-05-04 PROCEDURE — G8427 DOCREV CUR MEDS BY ELIG CLIN: HCPCS | Performed by: INTERNAL MEDICINE

## 2021-05-04 PROCEDURE — G8417 CALC BMI ABV UP PARAM F/U: HCPCS | Performed by: INTERNAL MEDICINE

## 2021-05-04 ASSESSMENT — ENCOUNTER SYMPTOMS
ABDOMINAL PAIN: 0
EYE PAIN: 0
RHINORRHEA: 0
CHOKING: 0
APNEA: 1
ABDOMINAL DISTENTION: 0
CHEST TIGHTNESS: 0
ALLERGIC/IMMUNOLOGIC NEGATIVE: 1
SHORTNESS OF BREATH: 0
VOMITING: 0
PHOTOPHOBIA: 0
NAUSEA: 0

## 2021-05-04 ASSESSMENT — SLEEP AND FATIGUE QUESTIONNAIRES
HOW LIKELY ARE YOU TO NOD OFF OR FALL ASLEEP WHILE SITTING QUIETLY AFTER LUNCH WITHOUT ALCOHOL: 2
HOW LIKELY ARE YOU TO NOD OFF OR FALL ASLEEP WHILE SITTING AND TALKING TO SOMEONE: 0
HOW LIKELY ARE YOU TO NOD OFF OR FALL ASLEEP IN A CAR, WHILE STOPPED FOR A FEW MINUTES IN TRAFFIC: 0
HOW LIKELY ARE YOU TO NOD OFF OR FALL ASLEEP WHILE LYING DOWN TO REST IN THE AFTERNOON WHEN CIRCUMSTANCES PERMIT: 2
HOW LIKELY ARE YOU TO NOD OFF OR FALL ASLEEP WHEN YOU ARE A PASSENGER IN A CAR FOR AN HOUR WITHOUT A BREAK: 0
ESS TOTAL SCORE: 10
HOW LIKELY ARE YOU TO NOD OFF OR FALL ASLEEP WHILE SITTING INACTIVE IN A PUBLIC PLACE: 0

## 2021-05-04 NOTE — LETTER
[facility]  [address]  [place] [address] [address]  Phone: [phone]  Fax: [phone]      May 4, 2021       Patient: Keisha Jones   MR Number: [id number]   YOB: 1980   Date of Visit: 5/4/2021     Thank you for allowing me to participate in the care of [facility]. Here is my assessment and plan. Also attached is a copy of her consult note:    ASSESSMENT:  Visit Diagnoses and Associated Orders     Hypersomnia   (New Problem)  -  Primary    needs work-up    Sleep Study with PAP Titration [96070 Custom]   - Future Order         Snoring   (New Problem)      needs work-up    Sleep Study with PAP Titration [[id number] Custom]   - Future Order         Hypothyroidism (acquired)   (Stable)           BHUMI (generalized anxiety disorder)   (Stable)           Class 3 severe obesity due to excess calories with serious comorbidity and body mass index (BMI) of 45.0 to 49.9 in [facility])   (Not Stable)      Sleep Study with PAP Titration [[id number] Custom]   - Future Order               Plan:  One or more undiagnosed new problem with uncertain prognosis till final diagnosis is made. Reviewed FROY: pathophysiology, diagnosis, complications and treatment. Instructed her not to drive if drowsy. Continue medications per her PCP and other physicians. Limit caffeine use after 3pm. Given severity of his Sx and medical Hx as well has very high probability for FROY will do split-night to expedite therapy for his FROY. 8 wk f/u after titration. The chronic medical conditions listed are directly related to the primary diagnosis listed above. The management of the primary diagnosis affects the secondary diagnosis and vice versa. This information was analyzed to assess complexity and medical decision making in regards to further testing and management. Continue meds for: Hypothyroid and BHUMI. Pt would medically benefit from wt loss for FROY (diet, exercise, surgical).     Orders Placed This Encounter   Procedures    POLYSOMNOGRAPHY (PSG) - Diagnostic Testing         If you have questions or concerns, please do not hesitate to call me. I look forward to following Sherin Bedolla along with you.     Sincerely,      Sb Villalba MD     providers:  Jordyn Castaneda MD  [address] [id number]  Via Fax: [address] [facility], [address] [address] [address]  Via In [place]

## 2021-05-04 NOTE — LETTER
[facility]  [address]  [place]  [address]  Phone: [phone]  Fax: [phone]      May 4, 2021       Patient: Codey Young   MR Number: [id number]   YOB: 1980   Date of Visit: 5/4/2021     Thank you for allowing me to participate in the care of [facility]. Here is my assessment and plan. Also attached is a copy of her consult note:    ASSESSMENT:  Visit Diagnoses and Associated Orders     Hypersomnia   (New Problem)  -  Primary    needs work-up    Sleep Study with PAP Titration [[id number] Custom]   - Future Order         Snoring   (New Problem)      needs work-up    Sleep Study with PAP Titration [[id number] Custom]   - Future Order         Hypothyroidism (acquired)   (Stable)           BHUMI (generalized anxiety disorder)   (Stable)           Class 3 severe obesity due to excess calories with serious comorbidity and body mass index (BMI) of 45.0 to 49.9 in adult [facility])   (Not Stable)      Sleep Study with PAP Titration [[id number] Custom]   - Future Order               Plan:       If you have questions or concerns, please do not hesitate to call me. I look forward to following Carli Dent along with you.     Sincerely,      Angella Be MD    CC providers:  Gloria Britton MD  [address] [place] [id number]  Via Fax: [address] [facility], [address] [facility] [address]  [address]  Via In [place]

## 2021-05-04 NOTE — PROGRESS NOTES
Demario Falk MD, Brant JenningsFormerly [facility]  Tiffanie Kehrt CNP  Janith Fabry CNP [place] [address]  Elidia Sanches [address] [facility], [address] [facility]- [phone]   [facility] [address] Elidia Sanches. [address] [facility]. Carolann Farmer 37 [phone]     [address] [place], [place] [address] [address] [facility] [address] [address]  Dept: [phone]  Loc: [phone]    Assessment:      Visit Diagnoses and Associated Orders     Hypersomnia   (New Problem)  -  Primary    needs work-up    POLYSOMNOGRAPHY (PSG) - Diagnostic Testing [43229 Custom]   - Future Order         Snoring   (New Problem)      needs work-up    POLYSOMNOGRAPHY (PSG) - Diagnostic Testing [36572 Custom]   - Future Order                Plan:      One or more undiagnosed new problem with uncertain prognosis till final diagnosis is made. Reviewed FROY: pathophysiology, diagnosis, complications and treatment. Instructed her not to drive if drowsy. Continue medications per her PCP and other physicians. Limit caffeine use after 3pm. Given severity of his Sx and medical Hx as well has very high probability for FROY will do split-night to expedite therapy for his FROY. 8 wk f/u after titration. The chronic medical conditions listed are directly related to the primary diagnosis listed above. The management of the primary diagnosis affects the secondary diagnosis and vice versa. This information was analyzed to assess complexity and medical decision making in regards to further testing and management. Continue meds for: Hypothyroid and BHUMI. Pt would medically benefit from wt loss for FROY (diet, exercise, surgical). Encouraged to quit tobacco in all forms, discussed different techniques to quit. Orders Placed This Encounter   Procedures    POLYSOMNOGRAPHY (PSG) - Diagnostic Testing          Subjective:     Patient ID: Denise Rodas is a 47 y.o. female.     Chief Complaint   Patient presents with    Daytime Sleepiness    Snoring       HPI: Reyes Ruiz is a 47 y.o. female referred by Marveen Brunner, MD for a sleep evaluation. She complains of: snoring, witnessed apneas, excessive daytime sleepiness , non-restorative sleep, nocturia, waking choking/gasping, AM headaches, drowsiness while driving, decreased concentration, short term memory issues and tossing and turning at night. She denies: cataplexy and hypnagogic hallucinations. Symptoms began >10 years ago, gradually worsening since that time. Previous evaluation and treatment has included- none    Chronic stable medical conditions: hypothyroid and BHUMI  Chronic unstable medical conditions: obesity    DOT/CDL - No  FAA/'s license -No    Previous Report(s) Reviewed: historical medical records, office notes, andreferral letter(s). Pertinent data has been documented. [place] - Total score: 10    Caffeine Intake - Pop/Soda: 2-3 can(s) per week    Social History     Socioeconomic History    Marital status:      Spouse name: Not on file    Number of children: 1    Years of education: 15    Highest education level: Not on file   Occupational History    Not on file   Social Needs    Financial resource strain: Not on file    Food insecurity     Worry: Not on file     Inability: Not on file   [employer] needs     Medical: Not on file     Non-medical: Not on file   Tobacco Use    Smoking status: Current Every Day Smoker     Packs/day: 0.50     Years: 18.00     Pack years: 9.00     Types: Cigarettes    Smokeless tobacco: Never Used    Tobacco comment: states cutting down on cigarrettes; maybe 3 per day   Substance and Sexual Activity    Alcohol use:  Yes     Alcohol/week: 2.0 standard drinks     Types: 2 Standard drinks or equivalent per week     Comment: social. heavier social weekend drinking in her 19's    Drug use: Yes     Types: Marijuana     Comment: occasional use    Sexual activity: Yes     Partners: Male   Lifestyle    Physical activity     Days per week: Not on file     Minutes per session: Not on file    Stress: Not on file   Relationships    Social connections     Talks on phone: Not on file     Gets together: Not on file     Attends Yazidism service: Not on file     Active member of club or organization: Not on file     Attends meetings of clubs or organizations: Not on file     Relationship status: Not on file    Intimate partner violence     Fear of current or ex partner: Not on file     Emotionally abused: Not on file     Physically abused: Not on file     Forced sexual activity: Not on file   Other Topics Concern    Not on file   Social History Narrative    Occupation: Has always worked in the [employer]. 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 Childhood hx: good, [place], [place]. Father was a , 8 children in the home (6 biological siblings), pt grew up one of 4 girls. Moved to [place] age 8. She later found out he had cheated on her mom before this. Father started having an affair - a 23 yo female who moved into their home, pt was first to suspect but criticized by others. Pt moved out at age 18 b/c of this issue. Moved back in age 23 after her dad  of cancer. Later found out father had cheated many times before. Education: did very well in school, enjoyed her time in school.  since         Current Outpatient Medications   Medication Sig Dispense Refill    diazePAM (VALIUM) 5 MG tablet Take 1 tablet by mouth 2 times daily as needed for Anxiety for up to 60 days.  Do not fill before 2021 37 tablet 1    buPROPion (WELLBUTRIN XL) 150 MG extended release tablet TAKE ONE TABLET BY MOUTH EVERY MORNING 30 tablet 2    FLUoxetine (PROZAC) 20 MG capsule Take 2 capsules by mouth daily 60 capsule 5    spironolactone (ALDACTONE) 25 MG tablet TAKE ONE TABLET BY MOUTH DAILY 30 tablet 2    levothyroxine (SYNTHROID) 125 MCG tablet TAKE ONE TABLET BY MOUTH DAILY 30 tablet 2    acetaZOLAMIDE (DIAMOX) 250 MG tablet TAKE ONE TABLET BY MOUTH TWICE A DAY 60 tablet 5    clindamycin (CLEOCIN T) 1 % external solution Apply to affected areas twice daily. 60 mL 4    acetaminophen (APAP EXTRA STRENGTH) 500 MG tablet Take 2 tablets by mouth every 6 hours as needed for Pain 120 tablet 3    albuterol sulfate HFA (PROVENTIL HFA) 108 (90 Base) MCG/ACT inhaler Inhale 2 puffs into the lungs every 6 hours as needed for Wheezing (Patient not taking: Reported on 2021) 1 Inhaler 3    valACYclovir (VALTREX) 500 MG tablet TAKE 1 TABLET BY MOUTH TWICE DAILY (Patient not taking: Reported on 2021) 60 tablet 1     No current facility-administered medications for this visit.         Allergies as of 2021 - Review Complete 2021   Allergen Reaction Noted    Minocycline Other (See Comments) 2017    Nsaids Other (See Comments) 10/14/2015       Patient Active Problem List   Diagnosis    Morbid obesity ([facility] [address]    Hypothyroidism (acquired)     History  of genital herpes    Anxiety    Tobacco use disorder    BHUMI (generalized anxiety disorder)       Past Medical History:   Diagnosis Date     History  of genital herpes     Allergic rhinitis due to pollen 2012    Anxiety     Carbuncle of breast 2015    Carpal tunnel syndrome of right wrist 2/15/2016    Cervical dysplasia     LEEP X2 THEN CRYOSURGERY    Contact lens/glasses fitting     Depression     Depressive disorder, not elsewhere classified 2012    BHUMI (generalized anxiety disorder) 10/15/2018    Ganglion cyst 10/14/2015    Hidradenitis suppurativa 2016    Hypothyroid     taking synthroid 50mcg    Postpartum hypertension 2014    only during incident with kidney failure--currently not medicated for  HTN    Vulval hidradenitis suppurativa 2015       Past Surgical History:   Procedure Laterality Date    CARPAL TUNNEL RELEASE Right 16    CARPAL TUNNEL RELEASE Left -     SECTION 2014    emergency C section    GYNECOLOGIC CRYOSURGERY      INDUCED       LEEP      times 2    TUBAL LIGATION  2014    WISDOM TOOTH EXTRACTION         Family History   Problem Relation Age of Onset    High Blood Pressure Father     Cancer Father         pancreatic    Diabetes Father     Alcohol Abuse Father     Mental Illness Father         \"temper problems\"    Diabetes Mother     Hypertension Maternal Grandfather     Cancer Paternal Grandmother         pancreatic    Eclampsia Sister     Mental Illness Other         mother's side. details unclear    Rheum Arthritis Neg Hx     Osteoarthritis Neg Hx     Asthma Neg Hx     Breast Cancer Neg Hx     Heart Failure Neg Hx     High Cholesterol Neg Hx     Migraines Neg Hx     Ovarian Cancer Neg Hx     Rashes/Skin Problems Neg Hx     Seizures Neg Hx     Stroke Neg Hx     Thyroid Disease Neg Hx        Review of Systems   Constitutional: Positive for fatigue and unexpected weight change. Negative for activity change and appetite change. HENT: Positive for congestion. Negative for nosebleeds, postnasal drip, rhinorrhea and sneezing. Eyes: Negative for photophobia, pain and visual disturbance. Respiratory: Positive for apnea. Negative for choking, chest tightness and shortness of breath. Cardiovascular: Positive for palpitations. Gastrointestinal: Negative for abdominal distention, abdominal pain, nausea and vomiting. Endocrine: Negative for cold intolerance and heat intolerance. Genitourinary: Positive for frequency. Negative for difficulty urinating, dysuria and urgency. Musculoskeletal: Negative. Negative for neck pain and neck stiffness. Skin: Negative. Allergic/Immunologic: Negative. Neurological: Negative for tremors, seizures, syncope and weakness. Hematological: Negative for adenopathy. Does not bruise/bleed easily. Psychiatric/Behavioral: Positive for decreased concentration and sleep disturbance.  Negative for agitation, behavioral problems and confusion. The patient is nervous/anxious. Objective:     Vitals:  Weight BMI   Wt Readings from Last 3 Encounters:   05/04/21 (!) 304 lb 3.2 oz (138 kg)   01/26/21 (!) 317 lb (143.8 kg)   09/24/20 (!) 313 lb (142 kg)    Body mass index is 46.25 kg/m². BP HR SaO2   BP Readings from Last 3 Encounters:   05/04/21 122/84   01/26/21 120/72   09/24/20 119/78    Pulse Readings from Last 3 Encounters:   05/04/21 64   01/26/21 86   09/24/20 78    SpO2 Readings from Last 3 Encounters:   05/04/21 98%   01/26/21 97%   09/24/20 98%        Physical Exam  Vitals signs reviewed. Constitutional:       General: She is not in acute distress. Appearance: Normal appearance. She is well-developed. She is obese. She is not toxic-appearing or diaphoretic. HENT:      Head: Normocephalic and atraumatic. Not macrocephalic and not microcephalic. Right Ear: External ear normal.      Left Ear: External ear normal.      Nose: Nasal deformity, septal deviation and mucosal edema present. Mouth/Throat:      Lips: Pink. Mouth: Mucous membranes are moist.      Tongue: No lesions. Palate: No mass. Pharynx: Uvula midline. Uvula swelling present. No oropharyngeal exudate. Tonsils: No tonsillar exudate or tonsillar abscesses. Comments: Tonsils: normal size  Eyes:      General: Lids are normal.      Extraocular Movements: Extraocular movements intact. Conjunctiva/sclera: Conjunctivae normal.      Pupils: Pupils are equal, round, and reactive to light. Neck:      Musculoskeletal: Normal range of motion. Vascular: No JVD. Trachea: Trachea normal.      Comments: Neck Circ: 16 inches    Cardiovascular:      Rate and Rhythm: Normal rate and regular rhythm. Heart sounds: Normal heart sounds. Pulmonary:      Effort: Pulmonary effort is normal.      Breath sounds: Normal breath sounds.    Abdominal:      General: Bowel sounds are normal. Musculoskeletal:      Comments: No evidence of cyanosis or clubbing of nails   Skin:     General: Skin is warm. Nails: There is no clubbing. Neurological:      General: No focal deficit present. Mental Status: She is alert. Psychiatric:         Attention and Perception: Attention normal.         Mood and Affect: Mood and affect normal.         Speech: Speech normal.         Behavior: Behavior normal.         Thought Content:  Thought content normal.         Electronically signed by Maggie Campuzano MD on5/4/2021 at 3:38 PM

## 2021-05-11 ENCOUNTER — TELEPHONE (OUTPATIENT)
Dept: INTERNAL MEDICINE CLINIC | Age: 41
End: 2021-05-11

## 2021-05-11 NOTE — TELEPHONE ENCOUNTER
----- Message from Otis Nolasco sent at 5/11/2021 11:25 AM EDT -----  Subject: Message to Provider    QUESTIONS  Information for Provider? Pt. 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 Pt.   wants to know if she can come in to get the paper signed or should she   schedule an appt. Patient would like to bring the form in today. Can leave   a message on [employer] or [employer].   ---------------------------------------------------------------------------  --------------  CALL BACK INFO  What is the best way for the office to contact you? OK to leave message on   [employer], OK to respond with electronic message via [employer] portal (only   for patients who have registered [employer] account)  Preferred Call Back Phone Number? [phone]  ---------------------------------------------------------------------------  --------------  SCRIPT ANSWERS  Relationship to Patient?  Self

## 2021-05-14 ENCOUNTER — OFFICE VISIT (OUTPATIENT)
Dept: PRIMARY CARE CLINIC | Age: 41
End: 2021-05-14
Payer: COMMERCIAL

## 2021-05-14 DIAGNOSIS — Z01.811 PREOP PULMONARY/RESPIRATORY EXAM: Primary | ICD-10-CM

## 2021-05-14 LAB — SARS-COV-2: NOT DETECTED

## 2021-05-14 PROCEDURE — G8428 CUR MEDS NOT DOCUMENT: HCPCS | Performed by: NURSE PRACTITIONER

## 2021-05-14 PROCEDURE — G8417 CALC BMI ABV UP PARAM F/U: HCPCS | Performed by: NURSE PRACTITIONER

## 2021-05-14 PROCEDURE — 99211 OFF/OP EST MAY X REQ PHY/QHP: CPT | Performed by: NURSE PRACTITIONER

## 2021-05-19 ENCOUNTER — HOSPITAL ENCOUNTER (OUTPATIENT)
Dept: SLEEP CENTER | Age: 41
Discharge: HOME | End: 2021-05-19
Payer: COMMERCIAL

## 2021-05-19 DIAGNOSIS — E66.01 CLASS 3 SEVERE OBESITY DUE TO EXCESS CALORIES WITH SERIOUS COMORBIDITY AND BODY MASS INDEX (BMI) OF 45.0 TO 49.9 IN ADULT (HCC): Chronic | ICD-10-CM

## 2021-05-19 DIAGNOSIS — R06.83 SNORING: ICD-10-CM

## 2021-05-19 DIAGNOSIS — G47.10 HYPERSOMNIA: ICD-10-CM

## 2021-05-19 PROCEDURE — 95811 POLYSOM 6/>YRS CPAP 4/> PARM: CPT

## 2021-05-19 PROCEDURE — 95811 POLYSOM 6/>YRS CPAP 4/> PARM: CPT | Performed by: INTERNAL MEDICINE

## 2021-05-20 NOTE — PROGRESS NOTES
Herman Pena received a viral test for COVID-19. They were educated on isolation and quarantine as appropriate. For any symptoms, they were directed to seek care from their PCP, given contact information to establish with a doctor, directed to an urgent care or the emergency room.

## 2021-05-24 ENCOUNTER — TELEPHONE (OUTPATIENT)
Dept: PULMONOLOGY | Age: 41
End: 2021-05-24

## 2021-05-24 NOTE — PROGRESS NOTES
Anette White         : 1980    Diagnosis: [x] FROY (G47.33) [] CSA (G47.31) [] Apnea (G47.30)   Length of Need: [x] 12 Months [] 99 Months [] Other:    Machine (JAY!): [x] Respironics Dream Station      Auto [] ResMed AirSense     Auto [] Other:     []  CPAP () [x] Bilevel ()   Mode: [] Auto [] Spontaneous    Mode: [x] Auto [] Spontaneous                IPAP max 25  EPAP min 11 cmH2O  PS min 3 cmH2O  PS max 7 cmH2O             Comfort Settings:   - Ramp Pressure:5 cmH2O                                        - Ramp time: 15 min                                     -  Flex/EPR - 3 full time                                    - For ResMed Bilevel (TiMax-4 sec   TiMin- 0.2 sec)     Humidifier: [x] Heated ()        [x] Water chamber replacement ()/ 1 per 6 months        Mask:   [] Nasal () /1 per 3 months [x] Full Face () /1 per 3 months   [] Patient choice -Size and fit mask [x] Patient Choice - Size and fit mask   [] Dispense:  [] Dispense:    [] Headgear () / 1 per 3 months [x] Headgear () / 1 per 3 months   [] Replacement Nasal Cushion ()/2 per month [x] Interface Replacement ()/1 per month   [] Replacement Nasal Pillows ()/2 per month         Tubing: [x] Heated ()/1 per 3 months    [] Standard ()/1 per 3 months [] Other:           Filters: [x] Non-disposable ()/1 per 6 months     [x] Ultra-Fine, Disposable ()/2 per month        Miscellaneous: [] Chin Strap ()/ 1 per 6 months [] O2 bleed-in:       LPM   [] Oximetry on CPAP/Bilevel []  Other:    [x] Modem: ()         Start Order Date: 21    MEDICAL JUSTIFICATION:  I, the undersigned, certify that the above prescribed supplies are medically necessary for this patients wellbeing. In my opinion, the supplies are both reasonable and necessary in reference to accepted standards of medicalpractice in treatment of this patients condition.     Marija Epps MD      NPI: [id number]       Order Signed Date: 21    Electronically signed by Tutu Panda MD on 2021 at [address] [facility]  1980  [address]  [address]  [phone] (home)   [phone] (mobile)      Insurance Info (confirm with patient if correct):  Payor/Plan Subscr  Sex Relation Sub.  Ins. ID Effective Group Num

## 2021-06-03 ENCOUNTER — VIRTUAL VISIT (OUTPATIENT)
Dept: PSYCHIATRY | Age: 41
End: 2021-06-03
Payer: COMMERCIAL

## 2021-06-03 DIAGNOSIS — F41.1 GENERALIZED ANXIETY DISORDER: ICD-10-CM

## 2021-06-03 PROCEDURE — 99214 OFFICE O/P EST MOD 30 MIN: CPT | Performed by: PSYCHIATRY & NEUROLOGY

## 2021-06-03 PROCEDURE — G8427 DOCREV CUR MEDS BY ELIG CLIN: HCPCS | Performed by: PSYCHIATRY & NEUROLOGY

## 2021-06-03 PROCEDURE — 90833 PSYTX W PT W E/M 30 MIN: CPT | Performed by: PSYCHIATRY & NEUROLOGY

## 2021-06-03 RX ORDER — DIAZEPAM 5 MG/1
5 TABLET ORAL 2 TIMES DAILY PRN
Qty: 35 TABLET | Refills: 1 | Status: SHIPPED | OUTPATIENT
Start: 2021-06-18 | End: 2021-08-17

## 2021-06-03 RX ORDER — BUPROPION HYDROCHLORIDE 150 MG/1
TABLET ORAL
Qty: 30 TABLET | Refills: 5 | Status: SHIPPED | OUTPATIENT
Start: 2021-06-03 | End: 2022-01-31

## 2021-06-03 NOTE — PROGRESS NOTES
PSYCHIATRY PROGRESS NOTE  Telehealth visit      Ward Hunt  1980  6/3/2021  Provider location: Office  Patient location: Home  PCP: Flaquita Martell MD      CC:   Chief Complaint   Patient presents with    Depression    Anxiety     S: This virtual visit was conducted with the patient's consent. Services were provided through video using D[employer]. me software. Barrera Shannon has noted some positive changes. She got a new job in  and it seems to be a really good fit for her. She feels less stressed and less drained after her work day. She does still have periods of high anxiety and more catestrophic thinking (about finances, bills, her future, etc) but seems she can think about things in a more balanced way easier. Mood seems pretty positive and she is looking forward to getting further education and licensing to progress in this career path. Was diagnosed with FROY, severe per Dr. Madeleine Enciso. She is planned to start on bilevel but has not gotten the equipment yet. THERAPY MODALITIES: psychodynamic, supportive  THERAPY GOALS: reduce anxiety  THERAPY NOTES: explored how patient's childhood experience impacts her effectiveness in her current job. We also connected how her job reinforces positive cognitive processes that help her reduce anxiety and reinforce / remind her of the progress she has made in her own life to overcome her temperament issues from childhood. ROS: Positive for fatigue    Brief Medical Hx:   Pseudotumor cerebri, Hypothyroidism, morbid obesity, hidrandenitis suppurativa    Brief Psych Hx:  Med trials: lexapro, celexa, paxil (could not tolerate any of these, nausea), duloxetine (did not tolerate). Did take sertraline, not sure how long or what dose, but she did not have side effects.  Been on diazepam 5mg BID for over 10 yrs  NSSI: did cut self 4 times in late teens, but denies intent to end life    Current Outpatient Medications   Medication Sig Dispense Refill    spironolactone (ALDACTONE) 25 MG tablet TAKE ONE TABLET BY MOUTH DAILY 30 tablet 2    levothyroxine (SYNTHROID) 125 MCG tablet TAKE ONE TABLET BY MOUTH DAILY 30 tablet 2    diazePAM (VALIUM) 5 MG tablet Take 1 tablet by mouth 2 times daily as needed for Anxiety for up to 60 days. Do not fill before 4/21/2021 37 tablet 1    buPROPion (WELLBUTRIN XL) 150 MG extended release tablet TAKE ONE TABLET BY MOUTH EVERY MORNING 30 tablet 2    FLUoxetine (PROZAC) 20 MG capsule Take 2 capsules by mouth daily 60 capsule 5    acetaZOLAMIDE (DIAMOX) 250 MG tablet TAKE ONE TABLET BY MOUTH TWICE A DAY 60 tablet 5    clindamycin (CLEOCIN T) 1 % external solution Apply to affected areas twice daily. 60 mL 4    acetaminophen (APAP EXTRA STRENGTH) 500 MG tablet Take 2 tablets by mouth every 6 hours as needed for Pain 120 tablet 3     No current facility-administered medications for this visit. O:  Wt Readings from Last 3 Encounters:   05/04/21 (!) 304 lb 3.2 oz (138 kg)   01/26/21 (!) 317 lb (143.8 kg)   09/24/20 (!) 313 lb (142 kg)     Temp Readings from Last 3 Encounters:   01/26/21 97.6 °F (36.4 °C) (Infrared)   09/24/20 97.9 °F (36.6 °C) (Temporal)   06/23/20 98.2 °F (36.8 °C) (Temporal)     BP Readings from Last 3 Encounters:   05/04/21 122/84   01/26/21 120/72   09/24/20 119/78     Pulse Readings from Last 3 Encounters:   05/04/21 64   01/26/21 86   09/24/20 78     Mental Status Exam:   Appearance    No acute distress, well dressed and groomed,   Motor: No abnormal movements, tics or mannerisms.   Speech    spontaneous, normal rate and normal volume  Mood/Affect  ok /full quality good motility and range  Thought Process    linear, goal directed and coherent  Thought Content  future oriented, no SI, intent or plan  Associations    logical connections  Attention/Concentration    intact  Memory    recent and remote memory intact  Insight/Judgement    Good / Intact    Labs:     Lab Results   Component Value Date    LABA1C 5.3 10/23/2019 Lab Results   Component Value Date    .4 10/23/2019     Lab Results   Component Value Date    CHOL 186 10/23/2019    CHOL 200 (H) 07/29/2015    CHOL 202 (H) 09/19/2012     Lab Results   Component Value Date    TRIG 95 10/23/2019    TRIG 226 (H) 07/29/2015    TRIG 96 09/19/2012     Lab Results   Component Value Date    HDL 56 10/23/2019    HDL 67 (H) 07/29/2015    HDL 70 (H) 09/19/2012     Lab Results   Component Value Date    LDLCALC 111 (H) 10/23/2019    LDLCALC 88 07/29/2015    LDLCALC 113 (H) 09/19/2012     Lab Results   Component Value Date    LABVLDL 19 10/23/2019    LABVLDL 45 07/29/2015    LABVLDL 19 09/19/2012     No results found for: CHOLHDLRATIO      ASSESSMENT:   46 yo F who struggles with poor frustration tolerance, depression, and anxiety. She is doing well and seems to have found a career path that is a better fit for her which is helping. Tolerating reduction of diazepam. tx of FROY likely will be helpful for her fatigue and focus issues. 1. Generalized anxiety disorder  2. Unspecified depressive disorder  3. Tobacco use disorder  4. Obesity  5. Hypersomnia, rule out obstructive sleep apnea. 6.  History of pulmonary HTN    PLAN:   1. Continue diazepam 5 mg twice daily as needed anxiety, will lower to #35 tabs/ month  2. Continue bupropion XL 150mg daily  3. Continue fluoxetine 40mg daily  4. Informed of safety of using benzodiazepines with dx of FROY.  She should not use diazepam at bedtime, latest she should take is 5pm. Needs to be adherent with bipap treatment when she initiates this.        Medication Monitoring:    - [place]ARRS reviewed, no issues noted      Follow-up: RTC in 2 months    I spent 18 min on psychotherapy with the patient    Lesley Solozrano MD  Psychiatry

## 2021-08-05 ENCOUNTER — OFFICE VISIT (OUTPATIENT)
Dept: PSYCHIATRY | Age: 41
End: 2021-08-05
Payer: COMMERCIAL

## 2021-08-05 VITALS
TEMPERATURE: 97.4 F | BODY MASS INDEX: 44.41 KG/M2 | DIASTOLIC BLOOD PRESSURE: 80 MMHG | HEART RATE: 95 BPM | OXYGEN SATURATION: 96 % | SYSTOLIC BLOOD PRESSURE: 120 MMHG | WEIGHT: 293 LBS | HEIGHT: 68 IN

## 2021-08-05 DIAGNOSIS — F41.1 GAD (GENERALIZED ANXIETY DISORDER): Primary | ICD-10-CM

## 2021-08-05 DIAGNOSIS — F17.200 TOBACCO USE DISORDER: Chronic | ICD-10-CM

## 2021-08-05 PROCEDURE — G8427 DOCREV CUR MEDS BY ELIG CLIN: HCPCS | Performed by: PSYCHIATRY & NEUROLOGY

## 2021-08-05 PROCEDURE — 99214 OFFICE O/P EST MOD 30 MIN: CPT | Performed by: PSYCHIATRY & NEUROLOGY

## 2021-08-05 PROCEDURE — G8417 CALC BMI ABV UP PARAM F/U: HCPCS | Performed by: PSYCHIATRY & NEUROLOGY

## 2021-08-05 PROCEDURE — 4004F PT TOBACCO SCREEN RCVD TLK: CPT | Performed by: PSYCHIATRY & NEUROLOGY

## 2021-08-05 RX ORDER — DIAZEPAM 2 MG/1
2 TABLET ORAL 3 TIMES DAILY PRN
Qty: 80 TABLET | Refills: 1 | Status: SHIPPED | OUTPATIENT
Start: 2021-08-18 | End: 2021-10-17

## 2021-08-05 RX ORDER — FLUCONAZOLE 150 MG/1
TABLET ORAL
COMMUNITY
Start: 2021-06-11 | End: 2022-03-24 | Stop reason: ALTCHOICE

## 2021-08-05 NOTE — PROGRESS NOTES
PSYCHIATRY PROGRESS NOTE        Faisal Phan  1980  8/5/2021  PCP: Amna Thomas MD      CC:   Chief Complaint   Patient presents with    Follow-up     S:   Dealing with issues with her current job. Doesn't feel the pay is enough, and they haven't progressed her into training that they said they would. However, she is setting firm boundaries, communicating clearly her concerns, and has not compromised her other priorities in regards to her time and family for the job which has been good. She is exploring other job options. She does feel the fluoxetine has been helpful for managing her anxiety and mood. She still feel she can be impulsive, angry easily. Relies on the valium to help with this. Did ok with the small amount of reduction with the tablet #. Using bilevel. Feels better energy and sleep quality when she uses it. Sometimes forgets to use it. ROS: no adverse physical sx related to this encounter. Brief Medical Hx:   Pseudotumor cerebri, Hypothyroidism, morbid obesity, hidrandenitis suppurativa    Brief Psych Hx:  Med trials: lexapro, celexa, paxil (could not tolerate any of these, nausea), duloxetine (did not tolerate). Did take sertraline, not sure how long or what dose, but she did not have side effects. Been on diazepam 5mg BID for over 10 yrs  NSSI: did cut self 4 times in late teens, but denies intent to end life    Current Outpatient Medications   Medication Sig Dispense Refill    fluconazole (DIFLUCAN) 150 MG tablet Take one tablet by mouth every three days.  levothyroxine (SYNTHROID) 125 MCG tablet TAKE ONE TABLET BY MOUTH DAILY 30 tablet 1    diazePAM (VALIUM) 5 MG tablet Take 1 tablet by mouth 2 times daily as needed for Anxiety for up to 60 days. Do not fill before 6/18/2021. Must last 30 days.  35 tablet 1    buPROPion (WELLBUTRIN XL) 150 MG extended release tablet TAKE ONE TABLET BY MOUTH EVERY MORNING 30 tablet 5    FLUoxetine (PROZAC) 20 MG capsule Take 2 capsules by mouth daily 60 capsule 5    acetaZOLAMIDE (DIAMOX) 250 MG tablet TAKE ONE TABLET BY MOUTH TWICE A DAY 60 tablet 5    clindamycin (CLEOCIN T) 1 % external solution Apply to affected areas twice daily. 60 mL 4    acetaminophen (APAP EXTRA STRENGTH) 500 MG tablet Take 2 tablets by mouth every 6 hours as needed for Pain 120 tablet 3    spironolactone (ALDACTONE) 25 MG tablet TAKE ONE TABLET BY MOUTH DAILY (Patient not taking: Reported on 8/5/2021) 30 tablet 2     No current facility-administered medications for this visit. O:  Wt Readings from Last 3 Encounters:   08/05/21 (!) 304 lb (137.9 kg)   05/04/21 (!) 304 lb 3.2 oz (138 kg)   01/26/21 (!) 317 lb (143.8 kg)     Temp Readings from Last 3 Encounters:   08/05/21 97.4 °F (36.3 °C)   01/26/21 97.6 °F (36.4 °C) (Infrared)   09/24/20 97.9 °F (36.6 °C) (Temporal)     BP Readings from Last 3 Encounters:   08/05/21 120/80   05/04/21 122/84   01/26/21 120/72     Pulse Readings from Last 3 Encounters:   08/05/21 95   05/04/21 64   01/26/21 86     Mental Status Exam:   Appearance    No acute distress, well dressed and groomed,   Motor: No abnormal movements, tics or mannerisms.   Speech    spontaneous, normal rate and normal volume  Mood/Affect  ok /full quality good motility and range  Thought Process    linear, goal directed and coherent  Thought Content  future oriented, no SI, intent or plan  Associations    logical connections  Attention/Concentration    intact  Memory    recent and remote memory intact  Insight/Judgement    Good / Intact    Labs:     Lab Results   Component Value Date    LABA1C 5.3 10/23/2019     Lab Results   Component Value Date    .4 10/23/2019     Lab Results   Component Value Date    CHOL 186 10/23/2019    CHOL 200 (H) 07/29/2015    CHOL 202 (H) 09/19/2012     Lab Results   Component Value Date    TRIG 95 10/23/2019    TRIG 226 (H) 07/29/2015    TRIG 96 09/19/2012     Lab Results   Component Value Date    HDL 56

## 2021-08-09 ENCOUNTER — TELEPHONE (OUTPATIENT)
Dept: PULMONOLOGY | Age: 41
End: 2021-08-09

## 2021-08-09 ENCOUNTER — VIRTUAL VISIT (OUTPATIENT)
Dept: PULMONOLOGY | Age: 41
End: 2021-08-09
Payer: COMMERCIAL

## 2021-08-09 DIAGNOSIS — E66.01 CLASS 3 SEVERE OBESITY DUE TO EXCESS CALORIES WITH SERIOUS COMORBIDITY AND BODY MASS INDEX (BMI) OF 45.0 TO 49.9 IN ADULT (HCC): Chronic | ICD-10-CM

## 2021-08-09 DIAGNOSIS — G47.33 OSA TREATED WITH BIPAP: Primary | ICD-10-CM

## 2021-08-09 PROCEDURE — G8427 DOCREV CUR MEDS BY ELIG CLIN: HCPCS | Performed by: NURSE PRACTITIONER

## 2021-08-09 PROCEDURE — 99214 OFFICE O/P EST MOD 30 MIN: CPT | Performed by: NURSE PRACTITIONER

## 2021-08-09 ASSESSMENT — SLEEP AND FATIGUE QUESTIONNAIRES
HOW LIKELY ARE YOU TO NOD OFF OR FALL ASLEEP WHEN YOU ARE A PASSENGER IN A CAR FOR AN HOUR WITHOUT A BREAK: 2
HOW LIKELY ARE YOU TO NOD OFF OR FALL ASLEEP WHILE SITTING AND READING: 2
ESS TOTAL SCORE: 12
HOW LIKELY ARE YOU TO NOD OFF OR FALL ASLEEP WHILE SITTING AND TALKING TO SOMEONE: 1
HOW LIKELY ARE YOU TO NOD OFF OR FALL ASLEEP WHILE WATCHING TV: 3
HOW LIKELY ARE YOU TO NOD OFF OR FALL ASLEEP IN A CAR, WHILE STOPPED FOR A FEW MINUTES IN TRAFFIC: 0
HOW LIKELY ARE YOU TO NOD OFF OR FALL ASLEEP WHILE SITTING QUIETLY AFTER LUNCH WITHOUT ALCOHOL: 1
HOW LIKELY ARE YOU TO NOD OFF OR FALL ASLEEP WHILE LYING DOWN TO REST IN THE AFTERNOON WHEN CIRCUMSTANCES PERMIT: 3
HOW LIKELY ARE YOU TO NOD OFF OR FALL ASLEEP WHILE SITTING INACTIVE IN A PUBLIC PLACE: 0

## 2021-08-09 NOTE — PROGRESS NOTES
Bonny Minor MD, FAASM, [facility]  Mara Pond, MSN, RN, [address] [facility] [address] [phone]  Dept: [phone]  Dept Fax: [phone]  Loc: [phone]    Subjective:     Patient ID: Sean Hua is a 47 y.o. female. Chief Complaint   Patient presents with    Sleep Apnea       HPI:      Sleep Medicine Video Visit    Pursuant to the emergency declaration under the [address] [place] [address] [facility] authority and the [employer] Act this Telephone Visit was insisted, with patient's consent, to reduce the patient's risk of exposure to COVID-19 and provide continuity of care for an established patient. Services were provided through a synchronous discussion over a telephone and/or Video chat to substitute for in-person clinic visit, and coded as such. While patient is at home.     Machine Modem/Download Info:  Compliance (hours/night): 1.2 hrs/night  Download AHI (/hour): 3 /HR     Average IPAP Pressure: 17 cmH2O  Average EPAP Pressure: 13 cmH2O         AUTO BIPAP - Settings (Ti)  IPAP Max: 25 cmH2O  EPAP Min: 11 cmH2O  Pressure Support Min: 3  Pressure Support Max: 7             Comfort Settings  Humidity Level (0-8): 4  Flex/EPR (0-3): 3 PAP Mask  Mask Type: Full Face mask  Clinically Relevant Leak: No     [place] - Total score: 12    Follow-up :     Last Visit : May 2021      Subjective Health Changes: none      Over Night Oximetry: [] Yes  [] No  [x] NA [] WNL   Using O2: [] Yes  [] No  [x] NA   Patient is compliant with the machine  [] Yes  [x] No [] Per patient   Feeling rested when using the machine   [x] Yes  [] No     Pressure is comfortable with inspiration and expiration  [x] Yes  [] No   ([x] NA   [] Feeling of suffocation  [] Feeling like not enough air    [] To much pressure)     Noticed changes in pressure  [x] NA  [] Yes [] No     Mask is fitting well  [x] Yes  [] No   Noting Mask Air Leak  [] Yes  [x] No   Having painful Aerophagia  [] Yes  [x] No   Nocturia   0-1  per night. Having  HA upon waking  [] Yes  [x] No   Dry mouth upon waking   Dry Nose  Dry Eyes  [x] Yes  [] No   Congestion upon waking   [] Yes  [x] No    Nose Bleeds  [] Yes  [x] No   Using Sleep Aides  [x] NA  [] OTC  [] Per our office   [] Per another provider   Understands how to change humidification and/or tubing temperature for comfort while at home  [x] Yes  [] No     Difficulties falling asleep  [] Yes  [x] No   Difficulties staying asleep  [] Yes  [x] No   Approximate time to bed  10-11pm   Approximate wake time  6-7am   Taking Naps  frequent   If taking naps usual length  30-120min    [] NA   If taking naps using the machine [] NA  [] Yes    [x] No    [] With and With out    Drowsy when driving  [] Yes  [x] No     Does patient carry a DOT/CDL  [] Yes  [x] No     Does patient carry FAA/[employer] License   [] Yes  [x] No      Any concerns noted with the machine at this time  [] Yes  [x] No       Assessment/Plan:     1. FROY treated with BiPAP  Assessment & Plan:   After downloading data and reviewing  Reviewed compliance download with pt. Supplies and parts as needed for his machine. These are medically necessary. Continue medications per his PCP and other physicians. Limit caffeine use after 3pm.    The chronic medical conditions listed are directly related to the primary diagnosis listed above. The management of the primary diagnosis affects the secondary diagnosis and vice versa    Patient is NOT compliant at this time. Increasing the risk of heart attacks, strokes, car accidents, and/or death from uncontrolled Obstructive Sleep Apnea     2.  Class 3 severe obesity due to excess calories with serious comorbidity and body mass index (BMI) of 45.0 to 49.9 in [facility])  Assessment & Plan:  Patient encouraged to work on maintaining a healthy

## 2021-10-25 ENCOUNTER — PATIENT MESSAGE (OUTPATIENT)
Dept: PSYCHIATRY | Age: 41
End: 2021-10-25

## 2021-10-25 DIAGNOSIS — F41.1 GAD (GENERALIZED ANXIETY DISORDER): Primary | ICD-10-CM

## 2021-10-25 RX ORDER — DIAZEPAM 2 MG/1
2 TABLET ORAL 3 TIMES DAILY PRN
Qty: 80 TABLET | Refills: 0 | Status: SHIPPED | OUTPATIENT
Start: 2021-10-25 | End: 2021-11-22 | Stop reason: SDUPTHER

## 2021-10-25 NOTE — TELEPHONE ENCOUNTER
From: Marie Chiang  To: Jeannie Pérez MD  Sent: 10/25/2021 8:47 AM EDT  Subject: Prescription Question    Good morning Dr. Brennan Turner,  Is it possible to get a refill for diazepam sent into the pharmacy?

## 2021-11-11 ENCOUNTER — TELEPHONE (OUTPATIENT)
Dept: PULMONOLOGY | Age: 41
End: 2021-11-11

## 2021-11-11 NOTE — TELEPHONE ENCOUNTER
Attempted to reach the patient for their appointment.      Left message on the patients machine to complete or reschedule their appointment    Patient is DOT by our records and no download is available at this time, patient will need to have the download prior to the next follow up     Link sent also

## 2021-11-21 ENCOUNTER — PATIENT MESSAGE (OUTPATIENT)
Dept: PSYCHIATRY | Age: 41
End: 2021-11-21

## 2021-11-21 DIAGNOSIS — F41.1 GAD (GENERALIZED ANXIETY DISORDER): ICD-10-CM

## 2021-11-22 RX ORDER — DIAZEPAM 2 MG/1
2 TABLET ORAL 3 TIMES DAILY PRN
Qty: 80 TABLET | Refills: 0 | Status: SHIPPED | OUTPATIENT
Start: 2021-11-24 | End: 2021-12-27 | Stop reason: SDUPTHER

## 2021-11-23 RX ORDER — DIAZEPAM 2 MG/1
TABLET ORAL
Qty: 80 TABLET | OUTPATIENT
Start: 2021-11-23

## 2021-12-23 ENCOUNTER — PATIENT MESSAGE (OUTPATIENT)
Dept: PSYCHIATRY | Age: 41
End: 2021-12-23

## 2021-12-23 DIAGNOSIS — F41.1 GAD (GENERALIZED ANXIETY DISORDER): ICD-10-CM

## 2021-12-27 RX ORDER — DIAZEPAM 2 MG/1
2 TABLET ORAL 3 TIMES DAILY PRN
Qty: 80 TABLET | Refills: 0 | Status: SHIPPED | OUTPATIENT
Start: 2021-12-27 | End: 2022-01-25 | Stop reason: SDUPTHER

## 2021-12-27 NOTE — TELEPHONE ENCOUNTER
From: Mee Brown  To: Dr. Sujey Ashley: 12/23/2021 2:21 PM EST  Subject: Refill    Good afternoon Dr. Annamaria Maldonado, im trying to get a refill sent into the pharmacy for diazepam to  tomorrow. Im nervous if I can't get the refill in today I won't be able to get them by tomorrow and. Sherryle Moder ...im just struggling.

## 2021-12-29 ENCOUNTER — TELEPHONE (OUTPATIENT)
Dept: INTERNAL MEDICINE CLINIC | Age: 41
End: 2021-12-29

## 2021-12-29 NOTE — TELEPHONE ENCOUNTER
----- Message from Laura Phillips sent at 12/28/2021  4:54 PM EST -----  Subject: Appointment Request    Reason for Call: Routine (Patient Request) No Script    QUESTIONS  Type of Appointment? Established Patient  Reason for appointment request? No appointments available during search  Additional Information for Provider? pt is trying to see pcp josse but   no apt is available during search   ---------------------------------------------------------------------------  --------------  CALL BACK INFO  What is the best way for the office to contact you? OK to leave message on   voicemail,Do not leave any message, patient will call back for answer  Preferred Call Back Phone Number? [phone]  ---------------------------------------------------------------------------  --------------  SCRIPT ANSWERS  Relationship to Patient? Self  (Is the patient requesting to see the provider for a procedure?)? No  (Is the patient requesting to see the provider urgently  today or   tomorrow. )? No  Have you been diagnosed with, awaiting test results for, or told that you   are suspected of having COVID-19 (Coronavirus)? (If patient has tested   negative or was tested as a requirement for work, school, or travel and   not based on symptoms, answer no)? No  Within the past two weeks have you developed any of the following symptoms   (answer no if symptoms have been present longer than 2 weeks or began   more than 2 weeks ago)? Fever or Chills, Cough, Shortness of breath or   difficulty breathing, Loss of taste or smell, Sore throat, Nasal   congestion, Sneezing or runny nose, Fatigue or generalized body aches   (answer no if pain is specific to a body part e.g. back pain), Diarrhea,   Headache? No  Have you had close contact with someone with COVID-19 in the last 14 days? No  (Service Expert  click yes below to proceed with [employer] As Usual   Scheduling)?  Yes

## 2022-01-03 RX ORDER — FLUOXETINE HYDROCHLORIDE 20 MG/1
CAPSULE ORAL
Qty: 60 CAPSULE | Refills: 5 | Status: SHIPPED | OUTPATIENT
Start: 2022-01-03 | End: 2022-04-21 | Stop reason: SDUPTHER

## 2022-01-07 ENCOUNTER — TELEPHONE (OUTPATIENT)
Dept: INTERNAL MEDICINE CLINIC | Age: 42
End: 2022-01-07

## 2022-01-07 NOTE — TELEPHONE ENCOUNTER
----- Message from [address] sent at 1/7/2022 11:30 AM EST -----  Subject: Message to Provider    QUESTIONS  Information for Provider? Sabrina Cerda called in to get an appt   scheduled for Dr. Tessa Garrett please advise pt of next available in person   visit asap  ---------------------------------------------------------------------------  --------------  [address]  What is the best way for the office to contact you? OK to leave message on   voicemail  Preferred Call Back Phone Number? [phone]  ---------------------------------------------------------------------------  --------------  SCRIPT ANSWERS  Relationship to Patient?  Self

## 2022-01-25 ENCOUNTER — OFFICE VISIT (OUTPATIENT)
Dept: PSYCHIATRY | Age: 42
End: 2022-01-25
Payer: COMMERCIAL

## 2022-01-25 VITALS
OXYGEN SATURATION: 98 % | WEIGHT: 293 LBS | BODY MASS INDEX: 45.01 KG/M2 | TEMPERATURE: 97.1 F | SYSTOLIC BLOOD PRESSURE: 136 MMHG | DIASTOLIC BLOOD PRESSURE: 84 MMHG | HEART RATE: 53 BPM

## 2022-01-25 DIAGNOSIS — F41.1 GAD (GENERALIZED ANXIETY DISORDER): Primary | ICD-10-CM

## 2022-01-25 DIAGNOSIS — F32.A DEPRESSION, UNSPECIFIED DEPRESSION TYPE: ICD-10-CM

## 2022-01-25 PROCEDURE — 99214 OFFICE O/P EST MOD 30 MIN: CPT | Performed by: PSYCHIATRY & NEUROLOGY

## 2022-01-25 PROCEDURE — 4004F PT TOBACCO SCREEN RCVD TLK: CPT | Performed by: PSYCHIATRY & NEUROLOGY

## 2022-01-25 PROCEDURE — G8427 DOCREV CUR MEDS BY ELIG CLIN: HCPCS | Performed by: PSYCHIATRY & NEUROLOGY

## 2022-01-25 PROCEDURE — G8484 FLU IMMUNIZE NO ADMIN: HCPCS | Performed by: PSYCHIATRY & NEUROLOGY

## 2022-01-25 PROCEDURE — G8417 CALC BMI ABV UP PARAM F/U: HCPCS | Performed by: PSYCHIATRY & NEUROLOGY

## 2022-01-25 RX ORDER — DIAZEPAM 2 MG/1
2 TABLET ORAL 3 TIMES DAILY PRN
Qty: 70 TABLET | Refills: 1 | Status: SHIPPED | OUTPATIENT
Start: 2022-01-26 | End: 2022-03-24 | Stop reason: SDUPTHER

## 2022-01-25 NOTE — PROGRESS NOTES
PSYCHIATRY PROGRESS NOTE        Fernando Yadav  1980  1/25/2022  PCP: Blanca Hernandez MD      CC:   Chief Complaint   Patient presents with    Follow-up     S:   Last seen in August.   No major changes with mood. Fairly stable with a depression and anxiety standpoint but still not happy with where she is in her career and financial situation. Thinks about the many things she feels she wants to accomplish and where she wants to be in the future and then can get overwhelmed and down on herself thinking about where she is now. Still working as a  and has a routine she is comfortable with, but is looking at other employment options. Is poorly focused, jumps from one task to another, very easily distracted. Often can put things off or have low motivation to initiate things she needs to get done. She notes several of her siblings have a diagnosis of ADHD, many of whom had temper and behavioral problems at some point. She was able to quit smoking completely, now uses a vape occasionally. ROS: no adverse physical sx related to this encounter. Brief Medical Hx:   Pseudotumor cerebri, Hypothyroidism, morbid obesity, hidrandenitis suppurativa    Brief Psych Hx:  Med trials: lexapro, celexa, paxil (could not tolerate any of these, nausea), duloxetine (did not tolerate). Did take sertraline, not sure how long or what dose, but she did not have side effects. Been on diazepam 5mg BID for over 10 yrs  NSSI: did cut self 4 times in late teens, but denies intent to end life    Current Outpatient Medications   Medication Sig Dispense Refill    [START ON 1/26/2022] diazePAM (VALIUM) 2 MG tablet Take 1 tablet by mouth 3 times daily as needed for Anxiety for up to 60 days.  Each fill should last 30 days 70 tablet 1    FLUoxetine (PROZAC) 20 MG capsule TAKE TWO CAPSULES BY MOUTH DAILY 60 capsule 5    levothyroxine (SYNTHROID) 125 MCG tablet TAKE ONE TABLET BY MOUTH DAILY 30 tablet 1    buPROPion [facility] XL) 150 MG extended release tablet TAKE ONE TABLET BY MOUTH EVERY MORNING 30 tablet 5    spironolactone (ALDACTONE) 25 MG tablet TAKE ONE TABLET BY MOUTH DAILY 30 tablet 2    clindamycin (CLEOCIN T) 1 % external solution Apply to affected areas twice daily. 60 mL 4    acetaminophen (APAP EXTRA STRENGTH) 500 MG tablet Take 2 tablets by mouth every 6 hours as needed for Pain 120 tablet 3    fluconazole (DIFLUCAN) 150 MG tablet Take one tablet by mouth every three days. (Patient not taking: Reported on 1/25/2022)      nicotine polacrilex (NICORETTE) 2 MG gum Take 1 each by mouth every 4 hours as needed for Smoking cessation (Patient not taking: Reported on 1/25/2022) 110 each 0    acetaZOLAMIDE (DIAMOX) 250 MG tablet TAKE ONE TABLET BY MOUTH TWICE A DAY (Patient not taking: Reported on 1/25/2022) 60 tablet 5     No current facility-administered medications for this visit. O:  Wt Readings from Last 3 Encounters:   01/25/22 296 lb (134.3 kg)   08/05/21 (!) 304 lb (137.9 kg)   05/04/21 (!) 304 lb 3.2 oz (138 kg)     Temp Readings from Last 3 Encounters:   01/25/22 97.1 °F (36.2 °C) (Infrared)   08/05/21 97.4 °F (36.3 °C)   01/26/21 97.6 °F (36.4 °C) (Infrared)     BP Readings from Last 3 Encounters:   01/25/22 136/84   08/05/21 120/80   05/04/21 122/84     Pulse Readings from Last 3 Encounters:   01/25/22 53   08/05/21 95   05/04/21 64     Mental Status Exam:   Appearance    No acute distress, well dressed and groomed,   Motor: No abnormal movements, tics or mannerisms.   Speech    spontaneous, normal rate and normal volume  Mood/Affect  ok /full quality good motility and range  Thought Process    linear, goal directed and coherent  Thought Content  future oriented, no SI, intent or plan  Associations    logical connections  Attention/Concentration    intact  Memory    recent and remote memory intact  Insight/Judgement    Good / Intact    Labs:     Lab Results   Component Value Date    LABA1C 5.3 10/23/2019

## 2022-01-30 DIAGNOSIS — F41.1 GENERALIZED ANXIETY DISORDER: ICD-10-CM

## 2022-01-31 RX ORDER — BUPROPION HYDROCHLORIDE 150 MG/1
TABLET ORAL
Qty: 30 TABLET | Refills: 5 | Status: SHIPPED | OUTPATIENT
Start: 2022-01-31 | End: 2022-07-21 | Stop reason: SDUPTHER

## 2022-02-07 ENCOUNTER — TELEMEDICINE (OUTPATIENT)
Dept: INTERNAL MEDICINE CLINIC | Age: 42
End: 2022-02-07

## 2022-02-07 DIAGNOSIS — Z91.199 NO-SHOW FOR APPOINTMENT: Primary | ICD-10-CM

## 2022-02-07 PROCEDURE — 99999 PR OFFICE/OUTPT VISIT,PROCEDURE ONLY: CPT | Performed by: INTERNAL MEDICINE

## 2022-03-24 ENCOUNTER — TELEPHONE (OUTPATIENT)
Dept: INTERNAL MEDICINE CLINIC | Age: 42
End: 2022-03-24

## 2022-03-24 ENCOUNTER — OFFICE VISIT (OUTPATIENT)
Dept: PSYCHIATRY | Age: 42
End: 2022-03-24
Payer: COMMERCIAL

## 2022-03-24 VITALS
OXYGEN SATURATION: 99 % | SYSTOLIC BLOOD PRESSURE: 128 MMHG | BODY MASS INDEX: 45.31 KG/M2 | WEIGHT: 293 LBS | DIASTOLIC BLOOD PRESSURE: 88 MMHG | HEART RATE: 77 BPM | TEMPERATURE: 97.4 F

## 2022-03-24 DIAGNOSIS — F32.A DEPRESSION, UNSPECIFIED DEPRESSION TYPE: ICD-10-CM

## 2022-03-24 DIAGNOSIS — F41.1 GAD (GENERALIZED ANXIETY DISORDER): Primary | ICD-10-CM

## 2022-03-24 DIAGNOSIS — F90.2 ADHD (ATTENTION DEFICIT HYPERACTIVITY DISORDER), COMBINED TYPE: ICD-10-CM

## 2022-03-24 PROCEDURE — 99214 OFFICE O/P EST MOD 30 MIN: CPT | Performed by: PSYCHIATRY & NEUROLOGY

## 2022-03-24 PROCEDURE — G8484 FLU IMMUNIZE NO ADMIN: HCPCS | Performed by: PSYCHIATRY & NEUROLOGY

## 2022-03-24 PROCEDURE — G8417 CALC BMI ABV UP PARAM F/U: HCPCS | Performed by: PSYCHIATRY & NEUROLOGY

## 2022-03-24 PROCEDURE — G8427 DOCREV CUR MEDS BY ELIG CLIN: HCPCS | Performed by: PSYCHIATRY & NEUROLOGY

## 2022-03-24 PROCEDURE — 4004F PT TOBACCO SCREEN RCVD TLK: CPT | Performed by: PSYCHIATRY & NEUROLOGY

## 2022-03-24 RX ORDER — DIAZEPAM 2 MG/1
2 TABLET ORAL 3 TIMES DAILY PRN
Qty: 80 TABLET | Refills: 1 | Status: SHIPPED | OUTPATIENT
Start: 2022-03-24 | End: 2022-04-21 | Stop reason: SDUPTHER

## 2022-03-24 RX ORDER — METHYLPHENIDATE HYDROCHLORIDE 18 MG/1
18 TABLET ORAL DAILY
Qty: 30 TABLET | Refills: 0 | Status: SHIPPED | OUTPATIENT
Start: 2022-03-24 | End: 2022-04-21 | Stop reason: SDUPTHER

## 2022-03-24 NOTE — TELEPHONE ENCOUNTER
Patient called stating that pharmacy will not allow the refill of the following:  -diazePAM (VALIUM) 2 MG tablet       Patient is stating that she is 6 days out to have medication refilled.     Phone: [phone]

## 2022-03-24 NOTE — LETTER
[facility]   [address] [facility] [address]  Phone: [phone]  Fax: [phone]    Jacquelin Frankel, MD        March 24, 2022     Patient: Skip Resendiz   YOB: 1980   Date of Visit: 3/24/2022       To Whom It May Concern: This letter is to inform you that Marlon Lane was seen for an appointment today in our office.        Sincerely,        Jacquelin Frankel, MD

## 2022-03-24 NOTE — PROGRESS NOTES
PSYCHIATRY PROGRESS NOTE        Brian Davis  1980  03/24/2022  PCP: Kathy Cabral MD      CC:   Chief Complaint   Patient presents with    Follow-up     S:   Patient has been very overwhelmed at work. They are short staffed which is adding to the stress. She can be easily frustrated, irritated, franci when pulled in several direction and when there is a lot of noise and stimulation around her. Valium has been her way to manage this, and with the reduction, she has not felt these issues are well controlled, however she still wants to get off the medication. She ran out about 1 week ago and had to take days off of work due to how stressed she was. We discussed these issues in the context of her suspected dx of ADHD and it seems the overstimulation and struggles with focusing and completing one task at a time is a large contributor. She also notes similar issues at home which we have discussed at length previously as well. Her brother has a dx of ADHD and previously had been on chronic benzodiazepines before being properly diagnosed. ROS: no adverse physical sx related to this encounter. Brief Medical Hx:   Pseudotumor cerebri, Hypothyroidism, morbid obesity, hidrandenitis suppurativa    Brief Psych Hx:  Med trials: lexapro, celexa, paxil (could not tolerate any of these, nausea), duloxetine (did not tolerate). Did take sertraline, not sure how long or what dose, but she did not have side effects. Been on diazepam 5mg BID for over 10 yrs  NSSI: did cut self 4 times in late teens, but denies intent to end life    Current Outpatient Medications   Medication Sig Dispense Refill    methylphenidate (CONCERTA) 18 MG extended release tablet Take 1 tablet by mouth daily for 30 days. 30 tablet 0    diazePAM (VALIUM) 2 MG tablet Take 1 tablet by mouth 3 times daily as needed for Anxiety for up to 60 days.  Each fill should last 30 days 80 tablet 1    buPROPion (WELLBUTRIN XL) 150 MG extended release tablet TAKE ONE TABLET BY MOUTH EVERY MORNING 30 tablet 5    levothyroxine (SYNTHROID) 125 MCG tablet TAKE ONE TABLET BY MOUTH DAILY 30 tablet 1    FLUoxetine (PROZAC) 20 MG capsule TAKE TWO CAPSULES BY MOUTH DAILY 60 capsule 5    spironolactone (ALDACTONE) 25 MG tablet TAKE ONE TABLET BY MOUTH DAILY 30 tablet 2    acetaZOLAMIDE (DIAMOX) 250 MG tablet TAKE ONE TABLET BY MOUTH TWICE A DAY 60 tablet 5    clindamycin (CLEOCIN T) 1 % external solution Apply to affected areas twice daily. 60 mL 4    acetaminophen (APAP EXTRA STRENGTH) 500 MG tablet Take 2 tablets by mouth every 6 hours as needed for Pain 120 tablet 3     No current facility-administered medications for this visit. O:  Wt Readings from Last 3 Encounters:   03/24/22 298 lb (135.2 kg)   01/25/22 296 lb (134.3 kg)   08/05/21 (!) 304 lb (137.9 kg)     Temp Readings from Last 3 Encounters:   03/24/22 97.4 °F (36.3 °C) (Infrared)   01/25/22 97.1 °F (36.2 °C) (Infrared)   08/05/21 97.4 °F (36.3 °C)     BP Readings from Last 3 Encounters:   03/24/22 128/88   01/25/22 136/84   08/05/21 120/80     Pulse Readings from Last 3 Encounters:   03/24/22 77   01/25/22 53   08/05/21 95     Mental Status Exam:   Appearance    No acute distress, well dressed and groomed,   Motor: No abnormal movements, tics or mannerisms.   Speech    spontaneous, normal rate and normal volume  Mood/Affect  Stressed  / tearful at times, congruent to mood, anxious  Thought Process    linear, goal directed and coherent  Thought Content  future oriented, no SI, intent or plan  Associations    logical connections  Attention/Concentration    intact  Memory    recent and remote memory intact  Insight/Judgement    Good / Intact    Labs:     Lab Results   Component Value Date    LABA1C 5.3 10/23/2019     Lab Results   Component Value Date    .4 10/23/2019     Lab Results   Component Value Date    CHOL 186 10/23/2019    CHOL 200 (H) 07/29/2015    CHOL 202 (H) 09/19/2012     Lab Results   Component Value Date    TRIG 95 10/23/2019    TRIG 226 (H) 07/29/2015    TRIG 96 09/19/2012     Lab Results   Component Value Date    HDL 56 10/23/2019    HDL 67 (H) 07/29/2015    HDL 70 (H) 09/19/2012     Lab Results   Component Value Date    LDLCALC 111 (H) 10/23/2019    LDLCALC 88 07/29/2015    LDLCALC 113 (H) 09/19/2012     Lab Results   Component Value Date    LABVLDL 19 10/23/2019    LABVLDL 45 07/29/2015    LABVLDL 19 09/19/2012     No results found for: CHOLHDLRATIO      ASSESSMENT:   45 yo F who struggles with poor frustration tolerance, depression, and anxiety. I suspect underlying adhd is a major contributor to her issues and the valium has been her way to manage this, but the core problem hasn't been addressed adequately. Will try to introduce a stimulant and then re-try tapering the valium and I think this will go much smoother. 1. Generalized anxiety disorder  2. Unspecified depressive disorder  3. ADHD, combined type  3. Nicotine use disorder  4. Obesity  5. FROY on bipap    PLAN:   1. Continue valium 2mg BID prn, will return to #80/month for now  2. Will start concerta 99PQ daily. Discussed r/b/se  3. Continue bupropion XL 150mg daily - we may eventually stop this as we adjust the stimulant dose  4. Continue fluoxetine 40mg daily  5.  Start using bipap again.      Medication Monitoring:    - OARRS reviewed, no issues noted    - Continue to work on weight loss      Follow-up: RTC in 1 month      Ramses Renner MD  Psychiatry

## 2022-04-21 ENCOUNTER — OFFICE VISIT (OUTPATIENT)
Dept: PSYCHIATRY | Age: 42
End: 2022-04-21
Payer: COMMERCIAL

## 2022-04-21 VITALS
HEART RATE: 68 BPM | SYSTOLIC BLOOD PRESSURE: 128 MMHG | TEMPERATURE: 97.5 F | DIASTOLIC BLOOD PRESSURE: 88 MMHG | OXYGEN SATURATION: 98 % | WEIGHT: 293 LBS | BODY MASS INDEX: 44.41 KG/M2 | HEIGHT: 68 IN

## 2022-04-21 DIAGNOSIS — F90.2 ADHD (ATTENTION DEFICIT HYPERACTIVITY DISORDER), COMBINED TYPE: ICD-10-CM

## 2022-04-21 DIAGNOSIS — F11.21 OPIOID USE DISORDER, MODERATE, IN EARLY REMISSION, ON MAINTENANCE THERAPY (HCC): ICD-10-CM

## 2022-04-21 DIAGNOSIS — F41.1 GAD (GENERALIZED ANXIETY DISORDER): ICD-10-CM

## 2022-04-21 DIAGNOSIS — G93.2 PSEUDOTUMOR CEREBRI: ICD-10-CM

## 2022-04-21 DIAGNOSIS — F41.1 GENERALIZED ANXIETY DISORDER: ICD-10-CM

## 2022-04-21 DIAGNOSIS — F33.42 RECURRENT MAJOR DEPRESSIVE DISORDER, IN FULL REMISSION (HCC): ICD-10-CM

## 2022-04-21 DIAGNOSIS — E03.9 HYPOTHYROIDISM, UNSPECIFIED TYPE: Primary | ICD-10-CM

## 2022-04-21 DIAGNOSIS — G47.33 OSA TREATED WITH BIPAP: ICD-10-CM

## 2022-04-21 DIAGNOSIS — E03.9 HYPOTHYROIDISM, UNSPECIFIED TYPE: ICD-10-CM

## 2022-04-21 LAB
T4 FREE: 0.7 NG/DL (ref 0.9–1.8)
TSH REFLEX: 7.42 UIU/ML (ref 0.27–4.2)

## 2022-04-21 PROCEDURE — 4004F PT TOBACCO SCREEN RCVD TLK: CPT | Performed by: PSYCHIATRY & NEUROLOGY

## 2022-04-21 PROCEDURE — G8427 DOCREV CUR MEDS BY ELIG CLIN: HCPCS | Performed by: PSYCHIATRY & NEUROLOGY

## 2022-04-21 PROCEDURE — G8417 CALC BMI ABV UP PARAM F/U: HCPCS | Performed by: PSYCHIATRY & NEUROLOGY

## 2022-04-21 PROCEDURE — 99215 OFFICE O/P EST HI 40 MIN: CPT | Performed by: PSYCHIATRY & NEUROLOGY

## 2022-04-21 RX ORDER — METHYLPHENIDATE HYDROCHLORIDE 18 MG/1
18 TABLET ORAL DAILY
Qty: 30 TABLET | Refills: 0 | Status: SHIPPED | OUTPATIENT
Start: 2022-04-23 | End: 2022-09-29 | Stop reason: ALTCHOICE

## 2022-04-21 RX ORDER — METHYLPHENIDATE HYDROCHLORIDE 18 MG/1
18 TABLET ORAL DAILY
Qty: 30 TABLET | Refills: 0 | Status: SHIPPED | OUTPATIENT
Start: 2022-06-22 | End: 2022-07-21 | Stop reason: SDUPTHER

## 2022-04-21 RX ORDER — METHYLPHENIDATE HYDROCHLORIDE 18 MG/1
18 TABLET ORAL DAILY
Qty: 30 TABLET | Refills: 0 | Status: SHIPPED | OUTPATIENT
Start: 2022-05-23 | End: 2022-09-29 | Stop reason: ALTCHOICE

## 2022-04-21 RX ORDER — DIAZEPAM 2 MG/1
2 TABLET ORAL 2 TIMES DAILY PRN
Qty: 50 TABLET | Refills: 0 | Status: SHIPPED | OUTPATIENT
Start: 2022-06-22 | End: 2022-07-21 | Stop reason: SDUPTHER

## 2022-04-21 RX ORDER — FLUOXETINE HYDROCHLORIDE 20 MG/1
CAPSULE ORAL
Qty: 60 CAPSULE | Refills: 5 | Status: SHIPPED | OUTPATIENT
Start: 2022-04-21 | End: 2022-07-21 | Stop reason: SDUPTHER

## 2022-04-21 RX ORDER — DIAZEPAM 2 MG/1
2 TABLET ORAL 2 TIMES DAILY PRN
Qty: 60 TABLET | Refills: 0 | Status: SHIPPED | OUTPATIENT
Start: 2022-05-23 | End: 2022-06-22

## 2022-04-21 RX ORDER — DIAZEPAM 2 MG/1
2 TABLET ORAL 3 TIMES DAILY PRN
Qty: 70 TABLET | Refills: 0 | Status: SHIPPED | OUTPATIENT
Start: 2022-04-23 | End: 2022-05-23

## 2022-04-21 ASSESSMENT — PATIENT HEALTH QUESTIONNAIRE - PHQ9
5. POOR APPETITE OR OVEREATING: 0
7. TROUBLE CONCENTRATING ON THINGS, SUCH AS READING THE NEWSPAPER OR WATCHING TELEVISION: 0
3. TROUBLE FALLING OR STAYING ASLEEP: 1
SUM OF ALL RESPONSES TO PHQ QUESTIONS 1-9: 3
SUM OF ALL RESPONSES TO PHQ9 QUESTIONS 1 & 2: 1
SUM OF ALL RESPONSES TO PHQ QUESTIONS 1-9: 4
SUM OF ALL RESPONSES TO PHQ QUESTIONS 1-9: 4
10. IF YOU CHECKED OFF ANY PROBLEMS, HOW DIFFICULT HAVE THESE PROBLEMS MADE IT FOR YOU TO DO YOUR WORK, TAKE CARE OF THINGS AT HOME, OR GET ALONG WITH OTHER PEOPLE: 1
1. LITTLE INTEREST OR PLEASURE IN DOING THINGS: 0
SUM OF ALL RESPONSES TO PHQ QUESTIONS 1-9: 4
6. FEELING BAD ABOUT YOURSELF - OR THAT YOU ARE A FAILURE OR HAVE LET YOURSELF OR YOUR FAMILY DOWN: 0
9. THOUGHTS THAT YOU WOULD BE BETTER OFF DEAD, OR OF HURTING YOURSELF: 1
4. FEELING TIRED OR HAVING LITTLE ENERGY: 1
8. MOVING OR SPEAKING SO SLOWLY THAT OTHER PEOPLE COULD HAVE NOTICED. OR THE OPPOSITE, BEING SO FIGETY OR RESTLESS THAT YOU HAVE BEEN MOVING AROUND A LOT MORE THAN USUAL: 0
2. FEELING DOWN, DEPRESSED OR HOPELESS: 1

## 2022-04-21 ASSESSMENT — ANXIETY QUESTIONNAIRES
2. NOT BEING ABLE TO STOP OR CONTROL WORRYING: 0
IF YOU CHECKED OFF ANY PROBLEMS ON THIS QUESTIONNAIRE, HOW DIFFICULT HAVE THESE PROBLEMS MADE IT FOR YOU TO DO YOUR WORK, TAKE CARE OF THINGS AT HOME, OR GET ALONG WITH OTHER PEOPLE: SOMEWHAT DIFFICULT
4. TROUBLE RELAXING: 0
6. BECOMING EASILY ANNOYED OR IRRITABLE: 1
7. FEELING AFRAID AS IF SOMETHING AWFUL MIGHT HAPPEN: 0
GAD7 TOTAL SCORE: 2
1. FEELING NERVOUS, ANXIOUS, OR ON EDGE: 0
3. WORRYING TOO MUCH ABOUT DIFFERENT THINGS: 1
5. BEING SO RESTLESS THAT IT IS HARD TO SIT STILL: 0

## 2022-04-21 NOTE — PROGRESS NOTES
PSYCHIATRY PROGRESS NOTE        Petr Bentley  1980  04/21/2022  PCP: Galo Larson MD      CC:   Chief Complaint   Patient presents with    Follow-up     S:   Patient has improved since last visit with her mood. Feels much more hopeful, less dread and anxiety about the future. She feels in more control of her thoughts and emotions. Concentration and improved, less overwhelmed during the day, feels she can manage her work tasks without getting stressed much more easily. Initiating tasks has become easier. Opioid use d/o: pt started to misuse opioid pain pills (percocet primarily) about 1 yr ago. About 6 months ago she felt dependent and was using 30-60mg per day. She had occasionally used opioids in the past, but triggering factor was access at work from a co-worker. She was spending $1/mg and find herself increasingly more financially stressed by her use, depressed, and feeling withdrawal symptoms if she went without this. She sought treatment from a addiction provider and was started on suboxone shortly after our last visit. This was started at the same time as the concerta, and she felt both significantly improved how she was managing her day, reduced her reliance and cravings to use opioids and overall stress. She feels more prepared to reduce her valium use at this point as her overall anxiety feels better. The concerta is well tolerated when taking with food. Otherwise it causes sweating and nausea. It lasts typically until about 4:30. She can feel fatigue in the afternoon and evening as it wears off and may nap easily. She still has not started to use her BiPap again. She is gaining weight and noticing vision problems. Has not followed up with eye exams for her pseudotumor and neuro follow up. ROS: +weight gain, some vision changes.      Brief Medical Hx:   Pseudotumor cerebri, Hypothyroidism, morbid obesity, hidrandenitis suppurativa    Brief Psych Hx:  Med trials: lexapro, celexa, paxil (could not tolerate any of these, nausea), duloxetine (did not tolerate). Did take sertraline, not sure how long or what dose, but she did not have side effects. Been on diazepam 5mg BID for over 10 yrs  NSSI: did cut self 4 times in late teens, but denies intent to end life    Current Outpatient Medications   Medication Sig Dispense Refill    methylphenidate (CONCERTA) 18 MG extended release tablet Take 1 tablet by mouth daily for 30 days. 30 tablet 0    diazePAM (VALIUM) 2 MG tablet Take 1 tablet by mouth 3 times daily as needed for Anxiety for up to 60 days. Each fill should last 30 days 80 tablet 1    buPROPion (WELLBUTRIN XL) 150 MG extended release tablet TAKE ONE TABLET BY MOUTH EVERY MORNING 30 tablet 5    levothyroxine (SYNTHROID) 125 MCG tablet TAKE ONE TABLET BY MOUTH DAILY 30 tablet 1    FLUoxetine (PROZAC) 20 MG capsule TAKE TWO CAPSULES BY MOUTH DAILY 60 capsule 5    spironolactone (ALDACTONE) 25 MG tablet TAKE ONE TABLET BY MOUTH DAILY 30 tablet 2    acetaZOLAMIDE (DIAMOX) 250 MG tablet TAKE ONE TABLET BY MOUTH TWICE A DAY 60 tablet 5    clindamycin (CLEOCIN T) 1 % external solution Apply to affected areas twice daily. 60 mL 4    acetaminophen (APAP EXTRA STRENGTH) 500 MG tablet Take 2 tablets by mouth every 6 hours as needed for Pain 120 tablet 3     No current facility-administered medications for this visit.        O:  Wt Readings from Last 3 Encounters:   04/21/22 (!) 306 lb (138.8 kg)   03/24/22 298 lb (135.2 kg)   01/25/22 296 lb (134.3 kg)     Temp Readings from Last 3 Encounters:   04/21/22 97.5 °F (36.4 °C)   03/24/22 97.4 °F (36.3 °C) (Infrared)   01/25/22 97.1 °F (36.2 °C) (Infrared)     BP Readings from Last 3 Encounters:   04/21/22 128/88   03/24/22 128/88   01/25/22 136/84     Pulse Readings from Last 3 Encounters:   04/21/22 68   03/24/22 77   01/25/22 53     Mental Status Exam:   Appearance    No acute distress, well dressed and groomed,   Motor: No abnormal movements, tics or mannerisms. Speech    spontaneous, normal rate and normal volume  Mood/Affect  good  / full quality, good motility and range  Thought Process    linear, goal directed and coherent  Thought Content  future oriented, no SI, intent or plan  Associations    logical connections  Attention/Concentration    intact  Memory    recent and remote memory intact  Insight/Judgement    Good / Intact    Labs:     Lab Results   Component Value Date    LABA1C 5.3 10/23/2019     Lab Results   Component Value Date    .4 10/23/2019     Lab Results   Component Value Date    CHOL 186 10/23/2019    CHOL 200 (H) 07/29/2015    CHOL 202 (H) 09/19/2012     Lab Results   Component Value Date    TRIG 95 10/23/2019    TRIG 226 (H) 07/29/2015    TRIG 96 09/19/2012     Lab Results   Component Value Date    HDL 56 10/23/2019    HDL 67 (H) 07/29/2015    HDL 70 (H) 09/19/2012     Lab Results   Component Value Date    LDLCALC 111 (H) 10/23/2019    LDLCALC 88 07/29/2015    LDLCALC 113 (H) 09/19/2012     Lab Results   Component Value Date    LABVLDL 19 10/23/2019    LABVLDL 45 07/29/2015    LABVLDL 19 09/19/2012     No results found for: CHOLHDLRATIO      ASSESSMENT:   45 yo F who struggles with poor frustration tolerance, depression, and anxiety. Doing a lot better after admitting to opioid addiction issues and getting treatment for this, as well as treatment for underlying ADHD. Contributing factors that still need to be addressed are FROY, weight gain. In a much better position to tolerate valium taper at this point. 1. Generalized anxiety disorder  2. Opioid use disorder, moderate, in early remission, on maintenance therapy  3. MDD, in remission  4. ADHD, combined type  5. Nicotine use disorder  6. Obesity  7. FROY on bipap (non-adherent)  8. Pseudotumor cerebri   9. Hypothyroidism    PLAN:   1. Reduce valium to 2mg BID, #70/ month, reduce by 10 tabs monthly. 2. Continue concerta 75WE daily  3.  Continue bupropion XL 150mg daily   4. Continue fluoxetine 40mg daily  5. Initiate bipap treatment and maintain adherence with this  6. Work on weight loss and f/u for eye exam and neurology follow up.   7. Work on nicotine cessation (currently using a vape)  8. Check TSH given weight gain.      Medication Monitoring:    - OARRS reviewed, no issues noted      I spent 45 min on this encounter including documentation, chart review, and orders.      Follow-up: RTC in 3 months      Anmol Glaser MD  Psychiatry

## 2022-04-22 DIAGNOSIS — E03.9 HYPOTHYROIDISM (ACQUIRED): Chronic | ICD-10-CM

## 2022-04-22 RX ORDER — LEVOTHYROXINE SODIUM 0.12 MG/1
125 TABLET ORAL DAILY
Qty: 30 TABLET | Refills: 5 | Status: SHIPPED | OUTPATIENT
Start: 2022-04-22

## 2022-05-23 DIAGNOSIS — F41.1 GAD (GENERALIZED ANXIETY DISORDER): ICD-10-CM

## 2022-05-23 NOTE — TELEPHONE ENCOUNTER
Recent Visits  Date Type Provider Dept   04/21/22 Office Visit Carissa Marquez MD [facility] [address]   03/24/22 Office Visit Carissa Marquez MD [facility] [address]   01/25/22 Office Visit Carissa Marquez MD [facility] [address]   08/05/21 Office Visit Carissa Marquez MD (Old) [facility]   05/14/21 Office Visit KANNAN Murray (Old) [facility] [place]   01/26/21 Office Visit Carissa Marquez MD (Old) [facility] recent visits within past 540 days with a meds authorizing provider and meeting all other requirements  Future Appointments  Date Type Provider Dept   07/21/22 Appointment Carissa Marquez MD [facility] [address] [facility] future appointments within next 150 days with a meds authorizing provider and meeting all other requirements     4/21/2022

## 2022-05-24 RX ORDER — DIAZEPAM 2 MG/1
TABLET ORAL
Qty: 70 TABLET | OUTPATIENT
Start: 2022-05-24

## 2022-05-30 ENCOUNTER — PATIENT MESSAGE (OUTPATIENT)
Dept: NEUROLOGY | Age: 42
End: 2022-05-30

## 2022-05-31 ENCOUNTER — TELEPHONE (OUTPATIENT)
Dept: NEUROLOGY | Age: 42
End: 2022-05-31

## 2022-05-31 NOTE — TELEPHONE ENCOUNTER
From: Lashonda Phillips  To: Dr. Fry Mess: 5/30/2022 3:11 PM EDT  Subject: My eyes    Good Afternoon Syeda Must,  I badly need refills on Diamox. My eyes are very blurry again, lots of black patches coming and going, especially when under duress. My opthamology office is no longer open, and I am trying to get in with the new office that has taken its place. Do I need to schedule a visit with you to get refills of the diamox? If so I'll call to schedule right away, as I've said, my eyes are REALLY bothering me, and im scared for my vision. I hope you are doing well, and I look forward to hearing from you. Thank you for your time.   Sincerest Regards,  Sherie Abarca

## 2022-05-31 NOTE — TELEPHONE ENCOUNTER
Pt phoned would like a refill on Diamox she did make an appt however the first opening is July. She is going to check with her PCP and see if the will refill until her appt.

## 2022-07-14 ENCOUNTER — OFFICE VISIT (OUTPATIENT)
Dept: NEUROLOGY | Age: 42
End: 2022-07-14
Payer: COMMERCIAL

## 2022-07-14 VITALS
HEIGHT: 68 IN | BODY MASS INDEX: 44.41 KG/M2 | HEART RATE: 56 BPM | DIASTOLIC BLOOD PRESSURE: 113 MMHG | WEIGHT: 293 LBS | SYSTOLIC BLOOD PRESSURE: 174 MMHG

## 2022-07-14 DIAGNOSIS — G93.2 PSEUDOTUMOR CEREBRI: ICD-10-CM

## 2022-07-14 PROCEDURE — G8427 DOCREV CUR MEDS BY ELIG CLIN: HCPCS | Performed by: PSYCHIATRY & NEUROLOGY

## 2022-07-14 PROCEDURE — G8417 CALC BMI ABV UP PARAM F/U: HCPCS | Performed by: PSYCHIATRY & NEUROLOGY

## 2022-07-14 PROCEDURE — 4004F PT TOBACCO SCREEN RCVD TLK: CPT | Performed by: PSYCHIATRY & NEUROLOGY

## 2022-07-14 PROCEDURE — 99214 OFFICE O/P EST MOD 30 MIN: CPT | Performed by: PSYCHIATRY & NEUROLOGY

## 2022-07-14 RX ORDER — ACETAZOLAMIDE 250 MG/1
250 TABLET ORAL 2 TIMES DAILY
Qty: 60 TABLET | Refills: 2 | Status: SHIPPED | OUTPATIENT
Start: 2022-07-14 | End: 2022-10-10

## 2022-07-14 NOTE — PROGRESS NOTES
Childhood hx: good, grew up in [place], [place]. Father was a , 8 children in the home (6 biological siblings), pt grew up one of 4 girls. Moved to [place] age 8. She later found out he had cheated on her mom before this. Father started having an affair - a 25 yo female who moved into their home, pt was first to suspect but criticized by others. Pt moved out at age 18 b/c of this issue. Moved back in age 19[facility] after her dad  of cancer. Later found out father had cheated many times before. Education: did very well in school, enjoyed her time in school.  since      Social Determinants of Health     Financial Resource Strain:     Difficulty of Paying Living Expenses: Not on file   Food Insecurity:     Worried About Running Out of Food in the Last Year: Not on file    Enrico of Food in the Last Year: Not on file   Transportation Needs:     Lack of Transportation (Medical): Not on file    Lack of Transportation (Non-Medical):  Not on file   Physical Activity:     Days of Exercise per Week: Not on file    Minutes of Exercise per Session: Not on file   Stress:     Feeling of Stress : Not on file   Social Connections:     Frequency of Communication with Friends and Family: Not on file    Frequency of Social Gatherings with Friends and Family: Not on file    Attends [employer] Services: Not on file    Active Member of [address] [place] [address] or Organizations: Not on file    Attends Club or Organization Meetings: Not on file    Marital Status: Not on file   Intimate Partner Violence:     Fear of Current or Ex-Partner: Not on file    Emotionally Abused: Not on file    Physically Abused: Not on file    Sexually Abused: Not on file   Housing Stability:     Unable to Pay for Housing in the Last Year: Not on file    Number of [place] in the Last Year: Not on file    Unstable Housing in the Last Year: Not on file        Objective:  Exam:  BP (!) 178/113   Pulse 64   Ht 5' 8\" (1.727 m)   Wt (!) 302 lb (137 kg)   BMI 45.92 kg/m²   This is an obese patient in no acute distress  Patient is awake, alert and oriented x3. Speech is normal.  Pupils are equal round reacting to light. Bilateral  papilledema present . Extraocular movements intact. Face symmetrical. Tongue midline. Motor exam shows normal symmetrical strength. Deep tendon reflexes normal. Plantar reflexes downgoing. Sensory exam normal. Coordination normal. Gait normal. No carotid bruit. No neck stiffness. Data :  LABS:  General Labs:    CBC:   Lab Results   Component Value Date/Time    WBC 9.2 06/23/2020 04:25 PM    RBC 4.96 06/23/2020 04:25 PM    HGB 14.5 06/23/2020 04:25 PM    HCT 44.5 06/23/2020 04:25 PM    MCV 89.6 06/23/2020 04:25 PM    MCH 29.3 06/23/2020 04:25 PM    MCHC 32.7 06/23/2020 04:25 PM    RDW 14.1 06/23/2020 04:25 PM     06/23/2020 04:25 PM    MPV 10.4 06/23/2020 04:25 PM     BMP:    Lab Results   Component Value Date/Time     06/23/2020 04:25 PM    K 4.7 06/23/2020 04:25 PM     06/23/2020 04:25 PM    CO2 20 06/23/2020 04:25 PM    BUN 10 06/23/2020 04:25 PM    LABALBU 4.5 06/23/2020 04:25 PM    CREATININE 1.0 06/23/2020 04:25 PM    CALCIUM 9.2 06/23/2020 04:25 PM    GFRAA >60 06/23/2020 04:25 PM    GFRAA >60 09/19/2012 03:58 PM    LABGLOM >60 06/23/2020 04:25 PM    GLUCOSE 94 06/23/2020 04:25 PM       Impression :  Pseudotumor cerebri, symptoms worse recently  Patient has not been on Diamox for several months. Opening pressure In the past was 39 cm of CSF  MRI brain was normal  MR venogram showed mild stenosis of the transverse sinus but no thrombus was seen  Patient recently has had increased symptoms since she went off Diamox. Plan :  Discussed with patient  I will restart the patient on Diamox 250 mg twice daily. Side effects were discussed.   We discussed about the need for weight loss as a potential treatment for pseudotumor cerebri  Patient will try to continue to lose weight  I have recommended that she get a repeat eye testing done in 6 weeks and I will see her back in 3 months. If there is no improvement in papilledema we may have to increase the Diamox dose and even consider repeat lumbar puncture. High risk because of possibility of visual loss. Please note a portion of  this chart was generated using dragon dictation software. Although every effort was made to ensure the accuracy of this automated transcription, some errors in transcription may have occurred.

## 2022-07-21 ENCOUNTER — OFFICE VISIT (OUTPATIENT)
Dept: PSYCHIATRY | Age: 42
End: 2022-07-21
Payer: COMMERCIAL

## 2022-07-21 VITALS
OXYGEN SATURATION: 98 % | SYSTOLIC BLOOD PRESSURE: 138 MMHG | BODY MASS INDEX: 43.8 KG/M2 | RESPIRATION RATE: 16 BRPM | DIASTOLIC BLOOD PRESSURE: 86 MMHG | HEIGHT: 68 IN | HEART RATE: 76 BPM | WEIGHT: 289 LBS

## 2022-07-21 DIAGNOSIS — F90.2 ADHD (ATTENTION DEFICIT HYPERACTIVITY DISORDER), COMBINED TYPE: ICD-10-CM

## 2022-07-21 DIAGNOSIS — F41.1 GAD (GENERALIZED ANXIETY DISORDER): ICD-10-CM

## 2022-07-21 DIAGNOSIS — F33.42 RECURRENT MAJOR DEPRESSIVE DISORDER, IN FULL REMISSION (HCC): ICD-10-CM

## 2022-07-21 PROCEDURE — 99214 OFFICE O/P EST MOD 30 MIN: CPT | Performed by: PSYCHIATRY & NEUROLOGY

## 2022-07-21 PROCEDURE — G8427 DOCREV CUR MEDS BY ELIG CLIN: HCPCS | Performed by: PSYCHIATRY & NEUROLOGY

## 2022-07-21 PROCEDURE — 4004F PT TOBACCO SCREEN RCVD TLK: CPT | Performed by: PSYCHIATRY & NEUROLOGY

## 2022-07-21 PROCEDURE — G8417 CALC BMI ABV UP PARAM F/U: HCPCS | Performed by: PSYCHIATRY & NEUROLOGY

## 2022-07-21 RX ORDER — METHYLPHENIDATE HYDROCHLORIDE 18 MG/1
18 TABLET ORAL DAILY
Qty: 30 TABLET | Refills: 0 | Status: SHIPPED | OUTPATIENT
Start: 2022-08-21 | End: 2022-09-27 | Stop reason: SDUPTHER

## 2022-07-21 RX ORDER — FLUOXETINE HYDROCHLORIDE 20 MG/1
CAPSULE ORAL
Qty: 60 CAPSULE | Refills: 5 | Status: SHIPPED | OUTPATIENT
Start: 2022-07-21

## 2022-07-21 RX ORDER — DIAZEPAM 2 MG/1
2 TABLET ORAL DAILY PRN
Qty: 30 TABLET | Refills: 0 | Status: SHIPPED | OUTPATIENT
Start: 2022-08-21 | End: 2022-09-27 | Stop reason: SDUPTHER

## 2022-07-21 RX ORDER — METHYLPHENIDATE HYDROCHLORIDE 18 MG/1
18 TABLET ORAL DAILY
Qty: 30 TABLET | Refills: 0 | Status: SHIPPED | OUTPATIENT
Start: 2022-07-22 | End: 2022-09-29 | Stop reason: ALTCHOICE

## 2022-07-21 RX ORDER — HYDROCODONE BITARTRATE AND ACETAMINOPHEN 5; 325 MG/1; MG/1
TABLET ORAL
COMMUNITY
Start: 2022-07-19

## 2022-07-21 RX ORDER — DIAZEPAM 2 MG/1
2 TABLET ORAL 2 TIMES DAILY PRN
Qty: 40 TABLET | Refills: 0 | Status: SHIPPED | OUTPATIENT
Start: 2022-07-21 | End: 2022-08-20

## 2022-07-21 RX ORDER — BUPROPION HYDROCHLORIDE 150 MG/1
150 TABLET ORAL EVERY MORNING
Qty: 30 TABLET | Refills: 5 | Status: SHIPPED | OUTPATIENT
Start: 2022-07-21

## 2022-07-21 ASSESSMENT — PATIENT HEALTH QUESTIONNAIRE - PHQ9
8. MOVING OR SPEAKING SO SLOWLY THAT OTHER PEOPLE COULD HAVE NOTICED. OR THE OPPOSITE, BEING SO FIGETY OR RESTLESS THAT YOU HAVE BEEN MOVING AROUND A LOT MORE THAN USUAL: 0
6. FEELING BAD ABOUT YOURSELF - OR THAT YOU ARE A FAILURE OR HAVE LET YOURSELF OR YOUR FAMILY DOWN: 1
SUM OF ALL RESPONSES TO PHQ QUESTIONS 1-9: 4
7. TROUBLE CONCENTRATING ON THINGS, SUCH AS READING THE NEWSPAPER OR WATCHING TELEVISION: 0
9. THOUGHTS THAT YOU WOULD BE BETTER OFF DEAD, OR OF HURTING YOURSELF: 0
SUM OF ALL RESPONSES TO PHQ QUESTIONS 1-9: 4
1. LITTLE INTEREST OR PLEASURE IN DOING THINGS: 1
10. IF YOU CHECKED OFF ANY PROBLEMS, HOW DIFFICULT HAVE THESE PROBLEMS MADE IT FOR YOU TO DO YOUR WORK, TAKE CARE OF THINGS AT HOME, OR GET ALONG WITH OTHER PEOPLE: 0
SUM OF ALL RESPONSES TO PHQ QUESTIONS 1-9: 4
5. POOR APPETITE OR OVEREATING: 0
3. TROUBLE FALLING OR STAYING ASLEEP: 1
2. FEELING DOWN, DEPRESSED OR HOPELESS: 0
SUM OF ALL RESPONSES TO PHQ9 QUESTIONS 1 & 2: 1
SUM OF ALL RESPONSES TO PHQ QUESTIONS 1-9: 4
4. FEELING TIRED OR HAVING LITTLE ENERGY: 1

## 2022-07-21 ASSESSMENT — ANXIETY QUESTIONNAIRES
3. WORRYING TOO MUCH ABOUT DIFFERENT THINGS: 1
4. TROUBLE RELAXING: 0
7. FEELING AFRAID AS IF SOMETHING AWFUL MIGHT HAPPEN: 0
5. BEING SO RESTLESS THAT IT IS HARD TO SIT STILL: 0
GAD7 TOTAL SCORE: 3
IF YOU CHECKED OFF ANY PROBLEMS ON THIS QUESTIONNAIRE, HOW DIFFICULT HAVE THESE PROBLEMS MADE IT FOR YOU TO DO YOUR WORK, TAKE CARE OF THINGS AT HOME, OR GET ALONG WITH OTHER PEOPLE: NOT DIFFICULT AT ALL
6. BECOMING EASILY ANNOYED OR IRRITABLE: 1
2. NOT BEING ABLE TO STOP OR CONTROL WORRYING: 1
1. FEELING NERVOUS, ANXIOUS, OR ON EDGE: 0

## 2022-07-21 NOTE — PROGRESS NOTES
PSYCHIATRY PROGRESS NOTE        Chacorta Graham  1980  07/21/2022  PCP: Manuela Sprague MD      CC:   Chief Complaint   Patient presents with    Anxiety     S:   Continuing to see a benefit from the [address]. Sweating and nausea from it have improved. Focus maybe isn't as good as when she first started it, it still noticably works and helps her manage her job and home life much better. She feels less stressed, less irritated by noises in her environment, and better able to multitask and follow through with tasks. One negative she has noticed is that she is a little less talkative and social. Some family members have commented on her being a little more dulled out or not contacting them as much. Although she doesn't want to be this way, she also recognizes she feels that she is prioritizing her responsibiltiies more now which is important to her, and she is better able to set boundaries with not getting involved as much emotionally with people's personal lives at work. Main stress has been dental pain over the last week. She had to go to the ED several times for the pain. She was prescribed Abx and a short course of vicodin which she has now completed and is planning to restart suboxone this evening as it has been over 24 hrs since her last vicodin. She is getting an extraction tomorrow which should help. She had severe htn in the ED likely influenced by the pain as it improved with better pain control. Her son is overweight and had poor blood work results recently. He is now 7 yo. She is trying to get him to eat healthier but feels resistance from her . ROS: +weight loss. Brief Medical Hx:   Pseudotumor cerebri, Hypothyroidism, morbid obesity, hidrandenitis suppurativa    Brief Psych Hx:  Med trials: lexapro, celexa, paxil (could not tolerate any of these, nausea), duloxetine (did not tolerate). Did take sertraline, not sure how long or what dose, but she did not have side effects. Been on diazepam 5mg BID for over 10 yrs  NSSI: did cut self 4 times in late teens, but denies intent to end life    Current Outpatient Medications   Medication Sig Dispense Refill    HYDROcodone-acetaminophen (NORCO) 5-325 MG per tablet       acetaZOLAMIDE (DIAMOX) 250 MG tablet Take 1 tablet by mouth 2 times daily 60 tablet 2    levothyroxine (SYNTHROID) 125 MCG tablet Take 1 tablet by mouth Daily 30 tablet 5    methylphenidate (CONCERTA) 18 MG extended release tablet Take 1 tablet by mouth daily for 30 days. Do not fill before 6/22/2022 30 tablet 0    FLUoxetine (PROZAC) 20 MG capsule TAKE TWO CAPSULES BY MOUTH DAILY 60 capsule 5    diazePAM (VALIUM) 2 MG tablet Take 1 tablet by mouth 2 times daily as needed for Anxiety for up to 30 days. Do not fill before 6/22/2022 50 tablet 0    buPROPion (WELLBUTRIN XL) 150 MG extended release tablet TAKE ONE TABLET BY MOUTH EVERY MORNING 30 tablet 5    spironolactone (ALDACTONE) 25 MG tablet TAKE ONE TABLET BY MOUTH DAILY 30 tablet 2    clindamycin (CLEOCIN T) 1 % external solution Apply to affected areas twice daily. 60 mL 4    acetaminophen (APAP EXTRA STRENGTH) 500 MG tablet Take 2 tablets by mouth every 6 hours as needed for Pain 120 tablet 3    methylphenidate (CONCERTA) 18 MG extended release tablet Take 1 tablet by mouth daily for 30 days. Do not fill before 4/23/2022 30 tablet 0    methylphenidate (CONCERTA) 18 MG extended release tablet Take 1 tablet by mouth daily for 30 days. Do not fill before 5/23/2022 30 tablet 0     No current facility-administered medications for this visit.        O:  Wt Readings from Last 3 Encounters:   07/21/22 289 lb (131.1 kg)   07/14/22 (!) 302 lb (137 kg)   04/21/22 (!) 306 lb (138.8 kg)     Temp Readings from Last 3 Encounters:   04/21/22 97.5 °F (36.4 °C)   03/24/22 97.4 °F (36.3 °C) (Infrared)   01/25/22 97.1 °F (36.2 °C) (Infrared)     BP Readings from Last 3 Encounters:   07/21/22 138/86   07/14/22 (!) 174/113   04/21/22 128/88 Pulse Readings from Last 3 Encounters:   07/21/22 76   07/14/22 56   04/21/22 68     Mental Status Exam:   Appearance    No acute distress, well dressed and groomed,   Motor: No abnormal movements, tics or mannerisms. Speech    spontaneous, normal rate and normal volume  Mood/Affect  ok / full quality, good motility and range  Thought Process    linear, goal directed and coherent  Thought Content  future oriented, no SI, intent or plan  Associations    logical connections  Attention/Concentration    intact  Memory    recent and remote memory intact  Insight/Judgement    Good / Intact    Labs:     Lab Results   Component Value Date    LABA1C 5.3 10/23/2019     Lab Results   Component Value Date    .4 10/23/2019     Lab Results   Component Value Date    CHOL 186 10/23/2019    CHOL 200 (H) 07/29/2015    CHOL 202 (H) 09/19/2012     Lab Results   Component Value Date    TRIG 95 10/23/2019    TRIG 226 (H) 07/29/2015    TRIG 96 09/19/2012     Lab Results   Component Value Date    HDL 56 10/23/2019    HDL 67 (H) 07/29/2015    HDL 70 (H) 09/19/2012     Lab Results   Component Value Date    LDLCALC 111 (H) 10/23/2019    LDLCALC 88 07/29/2015    LDLCALC 113 (H) 09/19/2012     Lab Results   Component Value Date    LABVLDL 19 10/23/2019    LABVLDL 45 07/29/2015    LABVLDL 19 09/19/2012     No results found for: CHOLHDLRATIO      ASSESSMENT:   47 yo F who struggles with poor frustration tolerance, depression, and anxiety. Doing a lot better after admitting to opioid addiction issues and getting treatment for this, as well as treatment for underlying ADHD. Contributing factors that still need to be addressed are FROY, weight gain. In a much better position to tolerate valium taper at this point and we are working to get her off of this. 1. Generalized anxiety disorder  2. Opioid use disorder, moderate, in early remission, on maintenance therapy  3. MDD, in remission  4. ADHD, combined type  5.  Nicotine use disorder  6. Obesity  7. FROY on bipap   8. Pseudotumor cerebri   9. Hypothyroidism    PLAN:   1. Reduce valium to 2mg BID, #40/ month, continue to reduce by 10 tabs monthly. 2. Continue concerta 23SG daily  3. Continue bupropion XL 150mg daily   4. Continue fluoxetine 40mg daily  5. Continue FROY treatment  6.  Suboxone mgmt per addiction treatment provider     Medication Monitoring:    - OARRS reviewed, no issues noted        Follow-up: RTC in 6-8 weeks      Eneida Woods MD  Psychiatry

## 2022-08-23 ENCOUNTER — PATIENT MESSAGE (OUTPATIENT)
Dept: PSYCHIATRY | Age: 42
End: 2022-08-23

## 2022-08-23 DIAGNOSIS — F90.2 ADHD (ATTENTION DEFICIT HYPERACTIVITY DISORDER), COMBINED TYPE: ICD-10-CM

## 2022-08-23 NOTE — TELEPHONE ENCOUNTER
From: Olga Lanes  To: Dr. Jess Gaitan: 8/23/2022 4:43 PM EDT  Subject: Refills/ follow-up appointment    Good Afternoon ,   I hope all is well with you and the family! I need a refill of Concerta and diazepam, if you could. Im out, and the pharmacy said I needed to contact you for refills on the Concerta. I just scheduled my follow-up/ medication visit for September 8th. I look forward to seeing you soon, sir. Thank you!     Cecilia Lomeli

## 2022-08-24 NOTE — TELEPHONE ENCOUNTER
Prescriptions were written for both to be filled the end of august. Will need to reach out the the pharmacy to verify why they don't have these and why they can't fill the ones already sent. If they have them, they should fill both as patient is due for these.

## 2022-09-11 ENCOUNTER — TELEPHONE (OUTPATIENT)
Dept: INTERNAL MEDICINE CLINIC | Age: 42
End: 2022-09-11

## 2022-09-12 NOTE — TELEPHONE ENCOUNTER
Left Message for patient [phone] (home)  to call the office regarding below message.   Offered patient a follow up appointment for 9/15 9:30 or 10

## 2022-09-23 ENCOUNTER — PATIENT MESSAGE (OUTPATIENT)
Dept: PSYCHIATRY | Age: 42
End: 2022-09-23

## 2022-09-23 DIAGNOSIS — F90.2 ADHD (ATTENTION DEFICIT HYPERACTIVITY DISORDER), COMBINED TYPE: ICD-10-CM

## 2022-09-23 DIAGNOSIS — F41.1 GAD (GENERALIZED ANXIETY DISORDER): ICD-10-CM

## 2022-09-27 RX ORDER — METHYLPHENIDATE HYDROCHLORIDE 18 MG/1
18 TABLET ORAL DAILY
Qty: 30 TABLET | Refills: 0 | Status: SHIPPED | OUTPATIENT
Start: 2022-09-27 | End: 2022-10-27

## 2022-09-27 RX ORDER — DIAZEPAM 2 MG/1
2 TABLET ORAL DAILY PRN
Qty: 20 TABLET | Refills: 0 | Status: SHIPPED | OUTPATIENT
Start: 2022-09-27 | End: 2022-09-29 | Stop reason: SDUPTHER

## 2022-09-27 NOTE — TELEPHONE ENCOUNTER
From: Olga Lanes  To: Dr. Jess Gaitan: 9/23/2022 2:50 PM EDT  Subject: Refill    Good afternoon Dr. Minerva Castellano,  I am SO sorry i missed our last appointment, I have one scheduled for Sept.29th next week to come in for my follow-up. In the meantime, I am out of concerta and diazepam.. Tati Barragan I was trying to catch you before the weekend starts to ask if you could call me in refills. I hope you have a nice weekend, and I will see you Thursday morning. Thank you.   Cecilia Lomeli

## 2022-09-27 NOTE — TELEPHONE ENCOUNTER
Medication:   Requested Prescriptions     Pending Prescriptions Disp Refills    methylphenidate (CONCERTA) 18 MG extended release tablet 30 tablet 0     Sig: Take 1 tablet by mouth daily for 30 days. Do not fill  before 8/21/2022    diazePAM (VALIUM) 2 MG tablet 30 tablet 0     Sig: Take 1 tablet by mouth daily as needed for Anxiety for up to 30 days.  Do not fill before 8/21/2022       Last Filled:      Patient Phone Number: [phone] (home)     Last appt: 7/21/2022   Next appt: 9/29/2022

## 2022-09-29 ENCOUNTER — OFFICE VISIT (OUTPATIENT)
Dept: PSYCHIATRY | Age: 42
End: 2022-09-29
Payer: COMMERCIAL

## 2022-09-29 VITALS
BODY MASS INDEX: 44.25 KG/M2 | OXYGEN SATURATION: 99 % | HEIGHT: 68 IN | HEART RATE: 69 BPM | WEIGHT: 292 LBS | SYSTOLIC BLOOD PRESSURE: 118 MMHG | DIASTOLIC BLOOD PRESSURE: 70 MMHG | RESPIRATION RATE: 16 BRPM

## 2022-09-29 DIAGNOSIS — F41.1 GAD (GENERALIZED ANXIETY DISORDER): ICD-10-CM

## 2022-09-29 DIAGNOSIS — F90.2 ADHD (ATTENTION DEFICIT HYPERACTIVITY DISORDER), COMBINED TYPE: Primary | ICD-10-CM

## 2022-09-29 DIAGNOSIS — F11.21 OPIOID USE DISORDER, MODERATE, IN SUSTAINED REMISSION, ON MAINTENANCE THERAPY (HCC): ICD-10-CM

## 2022-09-29 DIAGNOSIS — F33.42 RECURRENT MAJOR DEPRESSIVE DISORDER, IN FULL REMISSION (HCC): ICD-10-CM

## 2022-09-29 PROCEDURE — G8417 CALC BMI ABV UP PARAM F/U: HCPCS | Performed by: PSYCHIATRY & NEUROLOGY

## 2022-09-29 PROCEDURE — G8427 DOCREV CUR MEDS BY ELIG CLIN: HCPCS | Performed by: PSYCHIATRY & NEUROLOGY

## 2022-09-29 PROCEDURE — 99214 OFFICE O/P EST MOD 30 MIN: CPT | Performed by: PSYCHIATRY & NEUROLOGY

## 2022-09-29 PROCEDURE — 4004F PT TOBACCO SCREEN RCVD TLK: CPT | Performed by: PSYCHIATRY & NEUROLOGY

## 2022-09-29 RX ORDER — METHYLPHENIDATE HYDROCHLORIDE 27 MG/1
27 TABLET ORAL DAILY
Qty: 30 TABLET | Refills: 0 | Status: SHIPPED | OUTPATIENT
Start: 2022-11-26 | End: 2022-12-26

## 2022-09-29 RX ORDER — DIAZEPAM 2 MG/1
2 TABLET ORAL DAILY PRN
Qty: 20 TABLET | Refills: 2 | Status: SHIPPED | OUTPATIENT
Start: 2022-10-27 | End: 2023-01-25

## 2022-09-29 RX ORDER — METHYLPHENIDATE HYDROCHLORIDE 27 MG/1
27 TABLET ORAL DAILY
Qty: 30 TABLET | Refills: 0 | Status: SHIPPED | OUTPATIENT
Start: 2022-10-27 | End: 2022-11-26

## 2022-09-29 ASSESSMENT — PATIENT HEALTH QUESTIONNAIRE - PHQ9
4. FEELING TIRED OR HAVING LITTLE ENERGY: 1
8. MOVING OR SPEAKING SO SLOWLY THAT OTHER PEOPLE COULD HAVE NOTICED. OR THE OPPOSITE, BEING SO FIGETY OR RESTLESS THAT YOU HAVE BEEN MOVING AROUND A LOT MORE THAN USUAL: 0
SUM OF ALL RESPONSES TO PHQ QUESTIONS 1-9: 7
SUM OF ALL RESPONSES TO PHQ QUESTIONS 1-9: 6
3. TROUBLE FALLING OR STAYING ASLEEP: 1
SUM OF ALL RESPONSES TO PHQ QUESTIONS 1-9: 7
6. FEELING BAD ABOUT YOURSELF - OR THAT YOU ARE A FAILURE OR HAVE LET YOURSELF OR YOUR FAMILY DOWN: 1
7. TROUBLE CONCENTRATING ON THINGS, SUCH AS READING THE NEWSPAPER OR WATCHING TELEVISION: 0
9. THOUGHTS THAT YOU WOULD BE BETTER OFF DEAD, OR OF HURTING YOURSELF: 1
5. POOR APPETITE OR OVEREATING: 1
2. FEELING DOWN, DEPRESSED OR HOPELESS: 1
10. IF YOU CHECKED OFF ANY PROBLEMS, HOW DIFFICULT HAVE THESE PROBLEMS MADE IT FOR YOU TO DO YOUR WORK, TAKE CARE OF THINGS AT HOME, OR GET ALONG WITH OTHER PEOPLE: 2
SUM OF ALL RESPONSES TO PHQ9 QUESTIONS 1 & 2: 2
1. LITTLE INTEREST OR PLEASURE IN DOING THINGS: 1
SUM OF ALL RESPONSES TO PHQ QUESTIONS 1-9: 7

## 2022-09-29 ASSESSMENT — COLUMBIA-SUICIDE SEVERITY RATING SCALE - C-SSRS
1. WITHIN THE PAST MONTH, HAVE YOU WISHED YOU WERE DEAD OR WISHED YOU COULD GO TO SLEEP AND NOT WAKE UP?: NO
2. HAVE YOU ACTUALLY HAD ANY THOUGHTS OF KILLING YOURSELF?: NO
6. HAVE YOU EVER DONE ANYTHING, STARTED TO DO ANYTHING, OR PREPARED TO DO ANYTHING TO END YOUR LIFE?: NO

## 2022-09-29 ASSESSMENT — ANXIETY QUESTIONNAIRES
4. TROUBLE RELAXING: 1
2. NOT BEING ABLE TO STOP OR CONTROL WORRYING: 1
1. FEELING NERVOUS, ANXIOUS, OR ON EDGE: 1
7. FEELING AFRAID AS IF SOMETHING AWFUL MIGHT HAPPEN: 0
GAD7 TOTAL SCORE: 6
5. BEING SO RESTLESS THAT IT IS HARD TO SIT STILL: 1
IF YOU CHECKED OFF ANY PROBLEMS ON THIS QUESTIONNAIRE, HOW DIFFICULT HAVE THESE PROBLEMS MADE IT FOR YOU TO DO YOUR WORK, TAKE CARE OF THINGS AT HOME, OR GET ALONG WITH OTHER PEOPLE: SOMEWHAT DIFFICULT
6. BECOMING EASILY ANNOYED OR IRRITABLE: 1
3. WORRYING TOO MUCH ABOUT DIFFERENT THINGS: 1

## 2022-09-29 NOTE — PROGRESS NOTES
PSYCHIATRY PROGRESS NOTE        Chaim Herrera  1980  09/29/2022  PCP: Leona Cho MD      CC:   Chief Complaint   Patient presents with    Depression     S:   Still seeing a benefit from [address] in her focus and being about to be less distracted by her environment. Doesn't feel it works as well as it was when she first started it. Had 3 days off of it and could tell a difference off - but she was proud of herself as she handled a difficult emotional experience off of her medication well and feels she was able to think through her thoughts and feels rather than act more impulsively. She feels the concerta overall when she does take it does help her with these issues. However she still feel overwhelmed in situations with a lot of people and stimulation and still feels a reliance on valium to give her some emotional control and relaxation to handle certain situations. Mood is stable. Had some sweating when she first started concerta, worries about that with dose increases. ROS: +weight loss. No cp, no palpitations    Brief Medical Hx:   Pseudotumor cerebri, Hypothyroidism, morbid obesity, hidrandenitis suppurativa    Brief Psych Hx:  Med trials: lexapro, celexa, paxil (could not tolerate any of these, nausea), duloxetine (did not tolerate). Sertraline - weight gain. Been on diazepam 5mg BID for over 10 yrs  NSSI: did cut self 4 times in late teens, but denies intent to end life    Current Psychiatric Medications:  Concerta 08OS daily  Valium 2mg BID prn anxiety (#20/month)  Wellbutrin XL 150mg daily  Fluoxetine 40mg daily    O:  Vitals:    09/29/22 0929   BP: 118/70   Pulse: 69   Resp: 16   SpO2: 99%      Weight: 292 lb (132.5 kg)      Mental Status Exam:   Appearance    No acute distress, well dressed and groomed,   Motor: No abnormal movements, tics or mannerisms.   Speech    spontaneous, normal rate and normal volume  Mood/Affect  ok / full quality, good motility and range  Thought Process linear, goal directed and coherent  Thought Content  future oriented, no SI, intent or plan  Associations    logical connections  Attention/Concentration    intact  Memory    recent and remote memory intact  Insight/Judgement    Good / Intact    Labs:      Latest Reference Range & Units 4/21/22 11:25   TSH 0.27 - 4.20 uIU/mL 7.42 (H)   T4 Free 0.9 - 1.8 ng/dL 0.7 (L)   (H): Data is abnormally high  (L): Data is abnormally low      ASSESSMENT:   45 yo F who struggles with poor frustration tolerance, depression, and anxiety. Doing better. ADHD treatment seems to be helping overall anxiety, impulsivity, mood, but may benefit from a little more control of these issues. 1. Generalized anxiety disorder  2. Opioid use disorder, moderate, in remission, on maintenance therapy  3. MDD, in remission  4. ADHD, combined type    PLAN:   1. Increase concerta to 37HI daily on next fill  2. Continue valium 2mg BID prn, #20/month, will work on reducing further after we give the higher dose of concerta some time.    2. Suboxone mgmt per addiction treatment provider     Medication Monitoring:    - OARRS reviewed, no issues noted        Follow-up: RTC in 2-3 months      Perry Dixon MD  Psychiatry

## 2022-10-10 DIAGNOSIS — G93.2 PSEUDOTUMOR CEREBRI: Primary | ICD-10-CM

## 2022-10-10 RX ORDER — ACETAZOLAMIDE 250 MG/1
TABLET ORAL
Qty: 60 TABLET | Refills: 0 | Status: SHIPPED | OUTPATIENT
Start: 2022-10-10

## 2022-11-09 DIAGNOSIS — G93.2 PSEUDOTUMOR CEREBRI: ICD-10-CM

## 2022-11-09 RX ORDER — ACETAZOLAMIDE 250 MG/1
TABLET ORAL
Qty: 60 TABLET | Refills: 0 | OUTPATIENT
Start: 2022-11-09

## 2022-12-09 DIAGNOSIS — E03.9 HYPOTHYROIDISM (ACQUIRED): Chronic | ICD-10-CM

## 2022-12-09 RX ORDER — LEVOTHYROXINE SODIUM 0.12 MG/1
TABLET ORAL
Qty: 30 TABLET | Refills: 5 | OUTPATIENT
Start: 2022-12-09

## 2022-12-29 ENCOUNTER — OFFICE VISIT (OUTPATIENT)
Dept: PSYCHIATRY | Age: 42
End: 2022-12-29
Payer: COMMERCIAL

## 2022-12-29 VITALS
SYSTOLIC BLOOD PRESSURE: 126 MMHG | HEIGHT: 68 IN | BODY MASS INDEX: 42.74 KG/M2 | DIASTOLIC BLOOD PRESSURE: 72 MMHG | WEIGHT: 282 LBS | HEART RATE: 63 BPM | OXYGEN SATURATION: 99 % | RESPIRATION RATE: 16 BRPM

## 2022-12-29 DIAGNOSIS — F41.1 GAD (GENERALIZED ANXIETY DISORDER): ICD-10-CM

## 2022-12-29 DIAGNOSIS — F90.2 ADHD (ATTENTION DEFICIT HYPERACTIVITY DISORDER), COMBINED TYPE: Primary | ICD-10-CM

## 2022-12-29 DIAGNOSIS — E03.9 HYPOTHYROIDISM, UNSPECIFIED TYPE: ICD-10-CM

## 2022-12-29 DIAGNOSIS — F33.42 RECURRENT MAJOR DEPRESSIVE DISORDER, IN FULL REMISSION (HCC): ICD-10-CM

## 2022-12-29 LAB — TSH REFLEX: 1.28 UIU/ML (ref 0.27–4.2)

## 2022-12-29 PROCEDURE — 99214 OFFICE O/P EST MOD 30 MIN: CPT | Performed by: PSYCHIATRY & NEUROLOGY

## 2022-12-29 PROCEDURE — G8484 FLU IMMUNIZE NO ADMIN: HCPCS | Performed by: PSYCHIATRY & NEUROLOGY

## 2022-12-29 PROCEDURE — 4004F PT TOBACCO SCREEN RCVD TLK: CPT | Performed by: PSYCHIATRY & NEUROLOGY

## 2022-12-29 PROCEDURE — G8417 CALC BMI ABV UP PARAM F/U: HCPCS | Performed by: PSYCHIATRY & NEUROLOGY

## 2022-12-29 PROCEDURE — G8427 DOCREV CUR MEDS BY ELIG CLIN: HCPCS | Performed by: PSYCHIATRY & NEUROLOGY

## 2022-12-29 RX ORDER — DIAZEPAM 2 MG/1
2 TABLET ORAL 2 TIMES DAILY PRN
Qty: 15 TABLET | Refills: 1 | Status: SHIPPED | OUTPATIENT
Start: 2022-12-29 | End: 2023-02-27

## 2022-12-29 RX ORDER — METHYLPHENIDATE HYDROCHLORIDE 18 MG/1
18 TABLET ORAL DAILY
Qty: 30 TABLET | Refills: 0 | Status: SHIPPED | OUTPATIENT
Start: 2023-01-28 | End: 2023-02-27

## 2022-12-29 RX ORDER — METHYLPHENIDATE HYDROCHLORIDE 18 MG/1
18 TABLET ORAL DAILY
Qty: 30 TABLET | Refills: 0 | Status: SHIPPED | OUTPATIENT
Start: 2022-12-29 | End: 2023-01-28

## 2022-12-29 ASSESSMENT — PATIENT HEALTH QUESTIONNAIRE - PHQ9
7. TROUBLE CONCENTRATING ON THINGS, SUCH AS READING THE NEWSPAPER OR WATCHING TELEVISION: 0
1. LITTLE INTEREST OR PLEASURE IN DOING THINGS: 0
SUM OF ALL RESPONSES TO PHQ QUESTIONS 1-9: 4
3. TROUBLE FALLING OR STAYING ASLEEP: 1
10. IF YOU CHECKED OFF ANY PROBLEMS, HOW DIFFICULT HAVE THESE PROBLEMS MADE IT FOR YOU TO DO YOUR WORK, TAKE CARE OF THINGS AT HOME, OR GET ALONG WITH OTHER PEOPLE: 1
5. POOR APPETITE OR OVEREATING: 0
8. MOVING OR SPEAKING SO SLOWLY THAT OTHER PEOPLE COULD HAVE NOTICED. OR THE OPPOSITE, BEING SO FIGETY OR RESTLESS THAT YOU HAVE BEEN MOVING AROUND A LOT MORE THAN USUAL: 0
SUM OF ALL RESPONSES TO PHQ QUESTIONS 1-9: 4
2. FEELING DOWN, DEPRESSED OR HOPELESS: 1
SUM OF ALL RESPONSES TO PHQ QUESTIONS 1-9: 4
SUM OF ALL RESPONSES TO PHQ9 QUESTIONS 1 & 2: 1
6. FEELING BAD ABOUT YOURSELF - OR THAT YOU ARE A FAILURE OR HAVE LET YOURSELF OR YOUR FAMILY DOWN: 1
4. FEELING TIRED OR HAVING LITTLE ENERGY: 1
9. THOUGHTS THAT YOU WOULD BE BETTER OFF DEAD, OR OF HURTING YOURSELF: 0
SUM OF ALL RESPONSES TO PHQ QUESTIONS 1-9: 4

## 2022-12-29 ASSESSMENT — ANXIETY QUESTIONNAIRES
3. WORRYING TOO MUCH ABOUT DIFFERENT THINGS: 1
5. BEING SO RESTLESS THAT IT IS HARD TO SIT STILL: 0
GAD7 TOTAL SCORE: 6
1. FEELING NERVOUS, ANXIOUS, OR ON EDGE: 1
2. NOT BEING ABLE TO STOP OR CONTROL WORRYING: 1
IF YOU CHECKED OFF ANY PROBLEMS ON THIS QUESTIONNAIRE, HOW DIFFICULT HAVE THESE PROBLEMS MADE IT FOR YOU TO DO YOUR WORK, TAKE CARE OF THINGS AT HOME, OR GET ALONG WITH OTHER PEOPLE: SOMEWHAT DIFFICULT
6. BECOMING EASILY ANNOYED OR IRRITABLE: 2
4. TROUBLE RELAXING: 0
7. FEELING AFRAID AS IF SOMETHING AWFUL MIGHT HAPPEN: 1

## 2022-12-29 NOTE — PROGRESS NOTES
PSYCHIATRY PROGRESS NOTE        Daryel Notch  1980  12/29/2022  PCP: Margarita Caruso MD      CC:   Chief Complaint   Patient presents with    ADHD    Depression     S:   With the higher dose of concerta she has felt more focused. However it does feel like too high of a dose and she does notice more irritability but she is not sure if this is due to not being able to tune out noises or due to the medication. She also has long standing issues with feeling extra heart beats, franci at night when laying down. This has been a problem present since childhood. Since the dose increase it has become more frequent and noticeable and this is worrying her. No associated chest pain although she can get chest pain on exertion. She is not on FROY treatment. SHe is making diet changes and losing weight. She is happy about imparting these changes in her 7 yo son but this this has been a challenge. ROS: +weight loss. No cp, occasional irregular heart beats w/some associated SOB. Brief Medical Hx:   Pseudotumor cerebri, Hypothyroidism, morbid obesity, hidrandenitis suppurativa    Brief Psych Hx:  Med trials: lexapro, celexa, paxil (could not tolerate any of these, nausea), duloxetine (did not tolerate). Sertraline - weight gain. Been on diazepam 5mg BID for over 10 yrs  NSSI: did cut self 4 times in late teens, but denies intent to end life    Current Psychiatric Medications:  Concerta 37BU daily  Valium 2mg BID prn anxiety (#20/month)  Wellbutrin XL 150mg daily  Fluoxetine 40mg daily    O:  Vitals:    12/29/22 1002   BP: 126/72   Pulse: 63   Resp: 16   SpO2: 99%      Weight: 282 lb (127.9 kg)      Mental Status Exam:   Appearance    No acute distress, well dressed and groomed,   Motor: No abnormal movements, tics or mannerisms.   Speech   excessive, somewhat increased vol and rate  Mood/Affect  ok / full quality, good motility and range  Thought Process    linear, goal directed and coherent  Thought Content  future oriented, no SI, intent or plan  Associations    logical connections  Attention/Concentration    intact  Memory    recent and remote memory intact  Insight/Judgement    Good / Intact    Labs:      Latest Reference Range & Units 4/21/22 11:25   TSH 0.27 - 4.20 uIU/mL 7.42 (H)   T4 Free 0.9 - 1.8 ng/dL 0.7 (L)   (H): Data is abnormally high  (L): Data is abnormally low      ASSESSMENT:   39 yo F who struggles with poor frustration tolerance, depression, and anxiety. ADHD tx is helping overall, however she may be having side effects at current dose of concerta, and biggest concern is her feeling what sounds like skipped heart beats or PVCs, which apparently isn't a new issue for her. 1. Generalized anxiety disorder  2. Opioid use disorder, moderate, in remission, on maintenance therapy  3. MDD, in remission  4. ADHD, combined type    PLAN:   1. Check TSH  2. Reduce concerta back to 90QK daily  3. D/c wellbutrin XL 150mg to further reduce stimulant exposure and risk of arrhythmia. 4. Recommended she f/u with sleep medicine as untreated FROY could be impacting her heart as well. 5. Recommended she f/u with Dr. Bhavesh Kelly if further cardiac workup is needed  6.  Reduce valium 2mg BID prn to #15/month       Medication Monitoring:    - OARRS reviewed, no issues noted        Follow-up: RTC in 2-3 months      Angelita Bui MD  Psychiatry

## 2023-01-06 DIAGNOSIS — E03.9 HYPOTHYROIDISM (ACQUIRED): Chronic | ICD-10-CM

## 2023-01-06 RX ORDER — LEVOTHYROXINE SODIUM 0.12 MG/1
TABLET ORAL
Qty: 30 TABLET | Refills: 5 | OUTPATIENT
Start: 2023-01-06

## 2023-01-08 DIAGNOSIS — E03.9 HYPOTHYROIDISM (ACQUIRED): Chronic | ICD-10-CM

## 2023-01-09 RX ORDER — LEVOTHYROXINE SODIUM 0.12 MG/1
TABLET ORAL
Qty: 30 TABLET | Refills: 5 | OUTPATIENT
Start: 2023-01-09

## 2023-01-18 ENCOUNTER — OFFICE VISIT (OUTPATIENT)
Dept: NEUROLOGY | Age: 43
End: 2023-01-18
Payer: COMMERCIAL

## 2023-01-18 VITALS
BODY MASS INDEX: 42.88 KG/M2 | SYSTOLIC BLOOD PRESSURE: 143 MMHG | DIASTOLIC BLOOD PRESSURE: 99 MMHG | HEART RATE: 104 BPM | WEIGHT: 282 LBS

## 2023-01-18 DIAGNOSIS — G93.2 PSEUDOTUMOR CEREBRI: ICD-10-CM

## 2023-01-18 PROCEDURE — 99213 OFFICE O/P EST LOW 20 MIN: CPT | Performed by: PSYCHIATRY & NEUROLOGY

## 2023-01-18 PROCEDURE — G8427 DOCREV CUR MEDS BY ELIG CLIN: HCPCS | Performed by: PSYCHIATRY & NEUROLOGY

## 2023-01-18 PROCEDURE — G8417 CALC BMI ABV UP PARAM F/U: HCPCS | Performed by: PSYCHIATRY & NEUROLOGY

## 2023-01-18 PROCEDURE — G8484 FLU IMMUNIZE NO ADMIN: HCPCS | Performed by: PSYCHIATRY & NEUROLOGY

## 2023-01-18 PROCEDURE — 4004F PT TOBACCO SCREEN RCVD TLK: CPT | Performed by: PSYCHIATRY & NEUROLOGY

## 2023-01-18 RX ORDER — ACETAZOLAMIDE 250 MG/1
TABLET ORAL
Qty: 60 TABLET | Refills: 5 | Status: SHIPPED | OUTPATIENT
Start: 2023-01-18

## 2023-01-18 NOTE — PROGRESS NOTES
Neena Carreon   Neurology followup    Subjective:   CC/HP  History was obtained from the patient and her Family.  Interval history:  Patient has known pseudotumor cerebri.   Patient stated that she is doing better.  She was seen by the ophthalmologist a few weeks ago.  Repeat eye exam showed that the papilledema has come down nicely and it is only minimal now.  Her headaches have been better.  She has been taking her medications regularly.  Detailed history:  Patient has had visual symptoms and headaches.  Patient had a lumbar puncture and was found to have significant elevated opening pressure at 39 cm of CSF.  After the lumbar puncture, patient had significant post spinal headaches but now is slowly recovering.  Patient had a blood patch as well.  Patient had briefly been on minocycline.  No other focal neurological symptoms.  Patient had an MRI brain as well as MR venogram    REVIEW OF SYSTEMS    Constitutional:  []   Chills   []  Fatigue   []  Fevers   []  Malaise   []  Weight loss     [x] Denies all of the above    Respiratory:   []  Cough    []  Shortness of breath         [x] Denies all of the above     Cardiovascular:   []  Chest pain    []  Exertional chest pressure/discomfort           [x] Palpitations    []  Syncope     [] Denies all of the above        Past Medical History:   Diagnosis Date     History  of genital herpes     Allergic rhinitis due to pollen 1/26/2012    Anxiety     Carbuncle of breast 12/22/2015    Carpal tunnel syndrome of right wrist 2/15/2016    Cervical dysplasia     LEEP X2 THEN CRYOSURGERY    Contact lens/glasses fitting     Depression     Depressive disorder, not elsewhere classified 1/26/2012    BHUMI (generalized anxiety disorder) 10/15/2018    Ganglion cyst 10/14/2015    Hidradenitis suppurativa 7/29/2016    Hypothyroid     taking synthroid 50mcg    Postpartum hypertension 2/26/2014    only during incident with kidney failure--currently not medicated for  HTN    Vulval  hidradenitis suppurativa 11/19/2015     Family History   Problem Relation Age of Onset    High Blood Pressure Father     Cancer Father         pancreatic    Diabetes Father     Alcohol Abuse Father     Mental Illness Father         \"temper problems\"    Diabetes Mother     Sleep Apnea Mother     Hypertension Maternal Grandfather     Cancer Paternal Grandmother         pancreatic    Eclampsia Sister     Mental Illness Other         mother's side. details unclear    Rheum Arthritis Neg Hx     Osteoarthritis Neg Hx     Asthma Neg Hx     Breast Cancer Neg Hx     Heart Failure Neg Hx     High Cholesterol Neg Hx     Migraines Neg Hx     Ovarian Cancer Neg Hx     Rashes/Skin Problems Neg Hx     Seizures Neg Hx     Stroke Neg Hx     Thyroid Disease Neg Hx      Social History     Socioeconomic History    Marital status:     Number of children: 1    Years of education: 14   Tobacco Use    Smoking status: Every Day     Packs/day: 0.50     Years: 18.00     Pack years: 9.00     Types: Cigarettes    Smokeless tobacco: Never    Tobacco comments:     states cutting down on cigarrettes; maybe 3 per day   Vaping Use    Vaping Use: Never used   Substance and Sexual Activity    Alcohol use: Yes     Alcohol/week: 2.0 standard drinks     Types: 2 Standard drinks or equivalent per week     Comment: social. heavier social weekend drinking in her 20's    Drug use: Yes     Types: Marijuana Theodora Beat)     Comment: occasional use    Sexual activity: Yes     Partners: Male   Social History Narrative    Occupation: Has always worked in the [employer]. 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 Childhood hx: good, grew up in [place], [place]. Father was a , 8 children in the home (6 biological siblings), pt grew up one of 4 girls. Moved to [place] age 8. She later found out he had cheated on her mom before this.  Father started having an affair - a 25 yo female who moved into their home, pt was first to suspect but criticized by others. Pt moved out at age 18 b/c of this issue. Moved back in age 23 after her dad  of cancer. Later found out father had cheated many times before. Education: did very well in school, enjoyed her time in school.  since         Objective:  Exam:  BP (!) 164/104   Pulse (!) 104   Wt 282 lb (127.9 kg)   BMI 42.88 kg/m²   This is an obese patient in no acute distress  Patient is awake, alert and oriented x3. Speech is normal.  Pupils are equal round reacting to light. Bilateral  papilledema present . Extraocular movements intact. Face symmetrical. Tongue midline. Motor exam shows normal symmetrical strength. Deep tendon reflexes normal. Plantar reflexes downgoing. Sensory exam normal. Coordination normal. Gait normal. No carotid bruit. No neck stiffness.       Data :  LABS:  General Labs:    CBC:   Lab Results   Component Value Date/Time    WBC 9.2 2020 04:25 PM    RBC 4.96 2020 04:25 PM    HGB 14.5 2020 04:25 PM    HCT 44.5 2020 04:25 PM    MCV 89.6 2020 04:25 PM    MCH 29.3 2020 04:25 PM    MCHC 32.7 2020 04:25 PM    RDW 14.1 2020 04:25 PM     2020 04:25 PM    MPV 10.4 2020 04:25 PM     BMP:    Lab Results   Component Value Date/Time     2020 04:25 PM    K 4.7 2020 04:25 PM     2020 04:25 PM    CO2 20 2020 04:25 PM    BUN 10 2020 04:25 PM    LABALBU 4.5 2020 04:25 PM    CREATININE 1.0 2020 04:25 PM    CALCIUM 9.2 2020 04:25 PM    GFRAA >60 2020 04:25 PM    GFRAA >60 2012 03:58 PM    LABGLOM >60 2020 04:25 PM    GLUCOSE 94 2020 04:25 PM       Impression :  Pseudotumor cerebri, symptoms improved and stable  Opening pressure In the past was 39 cm of CSF  MRI brain was normal  MR venogram showed mild stenosis of the transverse sinus but no thrombus was seen  Patient recently has had increased symptoms since she went off Diamox.    Plan :  Discussed with patient  Continue Diamox 250 mg twice daily.  Side effects were discussed.  We discussed about the need for weight loss as a potential treatment for pseudotumor cerebri  Patient will try to continue to lose weight  If she is doing well I will see her back only in 6 months    Please note a portion of  this chart was generated using dragon dictation software. Although every effort was made to ensure the accuracy of this automated transcription, some errors in transcription may have occurred.

## 2023-02-28 ENCOUNTER — PATIENT MESSAGE (OUTPATIENT)
Dept: PSYCHIATRY | Age: 43
End: 2023-02-28

## 2023-02-28 DIAGNOSIS — F33.42 RECURRENT MAJOR DEPRESSIVE DISORDER, IN FULL REMISSION (HCC): ICD-10-CM

## 2023-02-28 DIAGNOSIS — F90.2 ADHD (ATTENTION DEFICIT HYPERACTIVITY DISORDER), COMBINED TYPE: ICD-10-CM

## 2023-03-01 DIAGNOSIS — F90.2 ADHD (ATTENTION DEFICIT HYPERACTIVITY DISORDER), COMBINED TYPE: ICD-10-CM

## 2023-03-01 RX ORDER — FLUOXETINE HYDROCHLORIDE 20 MG/1
CAPSULE ORAL
Qty: 60 CAPSULE | Refills: 5 | Status: SHIPPED | OUTPATIENT
Start: 2023-03-01

## 2023-03-01 RX ORDER — METHYLPHENIDATE HYDROCHLORIDE 18 MG/1
18 TABLET ORAL DAILY
Qty: 30 TABLET | Refills: 0 | Status: SHIPPED | OUTPATIENT
Start: 2023-03-01 | End: 2023-03-31

## 2023-03-01 RX ORDER — METHYLPHENIDATE HYDROCHLORIDE 18 MG/1
18 TABLET ORAL DAILY
Qty: 30 TABLET | Refills: 0 | OUTPATIENT
Start: 2023-03-01 | End: 2023-03-31

## 2023-03-09 RX ORDER — BUPROPION HYDROCHLORIDE 150 MG/1
TABLET ORAL
Qty: 30 TABLET | Refills: 5 | OUTPATIENT
Start: 2023-03-09

## 2023-04-04 ENCOUNTER — PATIENT MESSAGE (OUTPATIENT)
Dept: PSYCHIATRY | Age: 43
End: 2023-04-04

## 2023-04-04 ENCOUNTER — OFFICE VISIT (OUTPATIENT)
Dept: PSYCHIATRY | Age: 43
End: 2023-04-04
Payer: COMMERCIAL

## 2023-04-04 VITALS
DIASTOLIC BLOOD PRESSURE: 70 MMHG | BODY MASS INDEX: 43.5 KG/M2 | SYSTOLIC BLOOD PRESSURE: 118 MMHG | RESPIRATION RATE: 16 BRPM | WEIGHT: 287 LBS | HEART RATE: 64 BPM | OXYGEN SATURATION: 98 % | HEIGHT: 68 IN

## 2023-04-04 DIAGNOSIS — F90.2 ADHD (ATTENTION DEFICIT HYPERACTIVITY DISORDER), COMBINED TYPE: Primary | ICD-10-CM

## 2023-04-04 DIAGNOSIS — F33.1 MODERATE EPISODE OF RECURRENT MAJOR DEPRESSIVE DISORDER (HCC): ICD-10-CM

## 2023-04-04 DIAGNOSIS — E03.9 HYPOTHYROIDISM, UNSPECIFIED TYPE: ICD-10-CM

## 2023-04-04 DIAGNOSIS — F41.1 GENERALIZED ANXIETY DISORDER: ICD-10-CM

## 2023-04-04 DIAGNOSIS — F11.21 OPIOID USE DISORDER, MODERATE, IN SUSTAINED REMISSION (HCC): ICD-10-CM

## 2023-04-04 LAB — TSH SERPL DL<=0.005 MIU/L-ACNC: 1.74 UIU/ML (ref 0.27–4.2)

## 2023-04-04 PROCEDURE — G8427 DOCREV CUR MEDS BY ELIG CLIN: HCPCS | Performed by: PSYCHIATRY & NEUROLOGY

## 2023-04-04 PROCEDURE — 99215 OFFICE O/P EST HI 40 MIN: CPT | Performed by: PSYCHIATRY & NEUROLOGY

## 2023-04-04 PROCEDURE — G8417 CALC BMI ABV UP PARAM F/U: HCPCS | Performed by: PSYCHIATRY & NEUROLOGY

## 2023-04-04 PROCEDURE — 4004F PT TOBACCO SCREEN RCVD TLK: CPT | Performed by: PSYCHIATRY & NEUROLOGY

## 2023-04-04 RX ORDER — METHYLPHENIDATE HYDROCHLORIDE 36 MG/1
36 TABLET ORAL EVERY MORNING
Qty: 30 TABLET | Refills: 0 | Status: SHIPPED | OUTPATIENT
Start: 2023-04-04 | End: 2023-05-04

## 2023-04-04 ASSESSMENT — PATIENT HEALTH QUESTIONNAIRE - PHQ9
6. FEELING BAD ABOUT YOURSELF - OR THAT YOU ARE A FAILURE OR HAVE LET YOURSELF OR YOUR FAMILY DOWN: 3
SUM OF ALL RESPONSES TO PHQ QUESTIONS 1-9: 20
5. POOR APPETITE OR OVEREATING: 1
3. TROUBLE FALLING OR STAYING ASLEEP: 2
SUM OF ALL RESPONSES TO PHQ9 QUESTIONS 1 & 2: 3
2. FEELING DOWN, DEPRESSED OR HOPELESS: 1
7. TROUBLE CONCENTRATING ON THINGS, SUCH AS READING THE NEWSPAPER OR WATCHING TELEVISION: 3
9. THOUGHTS THAT YOU WOULD BE BETTER OFF DEAD, OR OF HURTING YOURSELF: 3
8. MOVING OR SPEAKING SO SLOWLY THAT OTHER PEOPLE COULD HAVE NOTICED. OR THE OPPOSITE, BEING SO FIGETY OR RESTLESS THAT YOU HAVE BEEN MOVING AROUND A LOT MORE THAN USUAL: 2
1. LITTLE INTEREST OR PLEASURE IN DOING THINGS: 2
SUM OF ALL RESPONSES TO PHQ QUESTIONS 1-9: 20
SUM OF ALL RESPONSES TO PHQ QUESTIONS 1-9: 20
SUM OF ALL RESPONSES TO PHQ QUESTIONS 1-9: 17
4. FEELING TIRED OR HAVING LITTLE ENERGY: 3

## 2023-04-04 ASSESSMENT — ANXIETY QUESTIONNAIRES
5. BEING SO RESTLESS THAT IT IS HARD TO SIT STILL: 2
3. WORRYING TOO MUCH ABOUT DIFFERENT THINGS: 3
7. FEELING AFRAID AS IF SOMETHING AWFUL MIGHT HAPPEN: 2
6. BECOMING EASILY ANNOYED OR IRRITABLE: 3
1. FEELING NERVOUS, ANXIOUS, OR ON EDGE: 3
IF YOU CHECKED OFF ANY PROBLEMS ON THIS QUESTIONNAIRE, HOW DIFFICULT HAVE THESE PROBLEMS MADE IT FOR YOU TO DO YOUR WORK, TAKE CARE OF THINGS AT HOME, OR GET ALONG WITH OTHER PEOPLE: EXTREMELY DIFFICULT
4. TROUBLE RELAXING: 3
GAD7 TOTAL SCORE: 19
2. NOT BEING ABLE TO STOP OR CONTROL WORRYING: 3

## 2023-04-04 NOTE — PROGRESS NOTES
Appearance    No acute distress, well dressed and groomed,   Motor: No abnormal movements, tics or mannerisms. Speech   excessive, somewhat increased vol and rate  Mood/Affect  ok / full quality, good motility and range  Thought Process    linear, goal directed and coherent  Thought Content  future oriented, no SI, intent or plan  Associations    logical connections  Attention/Concentration    intact  Memory    recent and remote memory intact  Insight/Judgement    Good / Intact    Labs:     Lab Results   Component Value Date    TSH 3.39 10/23/2019    TSHREFLEX 1.28 12/29/2022          ASSESSMENT:   39 yo F who struggles with poor frustration tolerance, depression, and anxiety. ADHD tx is helping overall, but she seems to have done very poorly with the reduction to 18 of concerta, and needs more optimum control. Would need to watch for any palpitations / PVCs reoccuring, but not having the wellbutrin in addition may reduce the risk of this. If she is not getting enough thyroid hormone, this could also play a role, as well as untreated FROY and MJ use. 1. Generalized anxiety disorder  2. Opioid use disorder, moderate, in remission, on maintenance therapy  3. MDD, recurrent, moderate   4. ADHD, combined type    PLAN:   1. Start using Bipap regularly. If you need updated parts, get this addressed with your equipment supplier. Would eventually need f/u with sleep medicine as it has been several yrs. 2. Check TSH, take synthroid 30 prior to other meds or 2 hrs after eating at night. 3. Increase concerta to 32TJ daily. Monitor for side effects.    4. Continue fluoxetine 40mg daily      I spent 40 min on this encounter including face to face time, chart review, documentation and orders     Medication Monitoring:    - OARRS reviewed, no issues noted        Follow-up: RTC in 3 weeks      Dennis Lopez MD  Psychiatry

## 2023-04-24 ENCOUNTER — OFFICE VISIT (OUTPATIENT)
Dept: INTERNAL MEDICINE CLINIC | Age: 43
End: 2023-04-24
Payer: COMMERCIAL

## 2023-04-24 VITALS
OXYGEN SATURATION: 99 % | DIASTOLIC BLOOD PRESSURE: 84 MMHG | WEIGHT: 290 LBS | HEART RATE: 68 BPM | BODY MASS INDEX: 44.09 KG/M2 | SYSTOLIC BLOOD PRESSURE: 136 MMHG | TEMPERATURE: 97.7 F

## 2023-04-24 DIAGNOSIS — E28.2 POLYCYSTIC OVARIAN SYNDROME: ICD-10-CM

## 2023-04-24 DIAGNOSIS — M54.32 SCIATICA OF LEFT SIDE: ICD-10-CM

## 2023-04-24 DIAGNOSIS — E66.01 MORBID OBESITY (HCC): ICD-10-CM

## 2023-04-24 DIAGNOSIS — G93.2 PSEUDOTUMOR CEREBRI: ICD-10-CM

## 2023-04-24 DIAGNOSIS — E03.9 HYPOTHYROIDISM (ACQUIRED): Chronic | ICD-10-CM

## 2023-04-24 DIAGNOSIS — E03.9 HYPOTHYROIDISM (ACQUIRED): Primary | Chronic | ICD-10-CM

## 2023-04-24 DIAGNOSIS — F33.1 MODERATE EPISODE OF RECURRENT MAJOR DEPRESSIVE DISORDER (HCC): ICD-10-CM

## 2023-04-24 LAB
BASOPHILS # BLD: 0 K/UL (ref 0–0.2)
BASOPHILS NFR BLD: 0.6 %
DEPRECATED RDW RBC AUTO: 14.4 % (ref 12.4–15.4)
EOSINOPHIL # BLD: 0.2 K/UL (ref 0–0.6)
EOSINOPHIL NFR BLD: 2.9 %
ERYTHROCYTE [SEDIMENTATION RATE] IN BLOOD BY WESTERGREN METHOD: 24 MM/HR (ref 0–20)
HCT VFR BLD AUTO: 43.1 % (ref 36–48)
HGB BLD-MCNC: 14.3 G/DL (ref 12–16)
LYMPHOCYTES # BLD: 1.5 K/UL (ref 1–5.1)
LYMPHOCYTES NFR BLD: 21.5 %
MCH RBC QN AUTO: 29.5 PG (ref 26–34)
MCHC RBC AUTO-ENTMCNC: 33.2 G/DL (ref 31–36)
MCV RBC AUTO: 88.6 FL (ref 80–100)
MONOCYTES # BLD: 0.5 K/UL (ref 0–1.3)
MONOCYTES NFR BLD: 6.3 %
NEUTROPHILS # BLD: 4.9 K/UL (ref 1.7–7.7)
NEUTROPHILS NFR BLD: 68.7 %
PLATELET # BLD AUTO: 259 K/UL (ref 135–450)
PMV BLD AUTO: 9.9 FL (ref 5–10.5)
RBC # BLD AUTO: 4.86 M/UL (ref 4–5.2)
WBC # BLD AUTO: 7.2 K/UL (ref 4–11)

## 2023-04-24 PROCEDURE — 4004F PT TOBACCO SCREEN RCVD TLK: CPT | Performed by: INTERNAL MEDICINE

## 2023-04-24 PROCEDURE — G8417 CALC BMI ABV UP PARAM F/U: HCPCS | Performed by: INTERNAL MEDICINE

## 2023-04-24 PROCEDURE — 99204 OFFICE O/P NEW MOD 45 MIN: CPT | Performed by: INTERNAL MEDICINE

## 2023-04-24 PROCEDURE — G8427 DOCREV CUR MEDS BY ELIG CLIN: HCPCS | Performed by: INTERNAL MEDICINE

## 2023-04-24 RX ORDER — BUPRENORPHINE HYDROCHLORIDE AND NALOXONE HYDROCHLORIDE DIHYDRATE 8; 2 MG/1; MG/1
TABLET SUBLINGUAL DAILY
COMMUNITY

## 2023-04-24 SDOH — ECONOMIC STABILITY: FOOD INSECURITY: WITHIN THE PAST 12 MONTHS, YOU WORRIED THAT YOUR FOOD WOULD RUN OUT BEFORE YOU GOT MONEY TO BUY MORE.: NEVER TRUE

## 2023-04-24 SDOH — HEALTH STABILITY: PHYSICAL HEALTH: ON AVERAGE, HOW MANY DAYS PER WEEK DO YOU ENGAGE IN MODERATE TO STRENUOUS EXERCISE (LIKE A BRISK WALK)?: 7 DAYS

## 2023-04-24 SDOH — ECONOMIC STABILITY: FOOD INSECURITY: WITHIN THE PAST 12 MONTHS, THE FOOD YOU BOUGHT JUST DIDN'T LAST AND YOU DIDN'T HAVE MONEY TO GET MORE.: NEVER TRUE

## 2023-04-24 SDOH — ECONOMIC STABILITY: HOUSING INSECURITY
IN THE LAST 12 MONTHS, WAS THERE A TIME WHEN YOU DID NOT HAVE A STEADY PLACE TO SLEEP OR SLEPT IN A SHELTER (INCLUDING NOW)?: NO

## 2023-04-24 SDOH — ECONOMIC STABILITY: INCOME INSECURITY: HOW HARD IS IT FOR YOU TO PAY FOR THE VERY BASICS LIKE FOOD, HOUSING, MEDICAL CARE, AND HEATING?: VERY HARD

## 2023-04-24 SDOH — HEALTH STABILITY: PHYSICAL HEALTH: ON AVERAGE, HOW MANY MINUTES DO YOU ENGAGE IN EXERCISE AT THIS LEVEL?: 30 MIN

## 2023-04-24 ASSESSMENT — ENCOUNTER SYMPTOMS
SHORTNESS OF BREATH: 0
CONSTIPATION: 0
COUGH: 0
BLOOD IN STOOL: 0
DIARRHEA: 0

## 2023-04-24 ASSESSMENT — PATIENT HEALTH QUESTIONNAIRE - PHQ9
SUM OF ALL RESPONSES TO PHQ QUESTIONS 1-9: 7
3. TROUBLE FALLING OR STAYING ASLEEP: 1
7. TROUBLE CONCENTRATING ON THINGS, SUCH AS READING THE NEWSPAPER OR WATCHING TELEVISION: 0
10. IF YOU CHECKED OFF ANY PROBLEMS, HOW DIFFICULT HAVE THESE PROBLEMS MADE IT FOR YOU TO DO YOUR WORK, TAKE CARE OF THINGS AT HOME, OR GET ALONG WITH OTHER PEOPLE: 1
2. FEELING DOWN, DEPRESSED OR HOPELESS: 2
4. FEELING TIRED OR HAVING LITTLE ENERGY: 1
6. FEELING BAD ABOUT YOURSELF - OR THAT YOU ARE A FAILURE OR HAVE LET YOURSELF OR YOUR FAMILY DOWN: 3
SUM OF ALL RESPONSES TO PHQ QUESTIONS 1-9: 9
8. MOVING OR SPEAKING SO SLOWLY THAT OTHER PEOPLE COULD HAVE NOTICED. OR THE OPPOSITE, BEING SO FIGETY OR RESTLESS THAT YOU HAVE BEEN MOVING AROUND A LOT MORE THAN USUAL: 0
SUM OF ALL RESPONSES TO PHQ QUESTIONS 1-9: 9
5. POOR APPETITE OR OVEREATING: 0
9. THOUGHTS THAT YOU WOULD BE BETTER OFF DEAD, OR OF HURTING YOURSELF: 2
SUM OF ALL RESPONSES TO PHQ9 QUESTIONS 1 & 2: 2
SUM OF ALL RESPONSES TO PHQ QUESTIONS 1-9: 9
1. LITTLE INTEREST OR PLEASURE IN DOING THINGS: 0

## 2023-04-24 ASSESSMENT — COLUMBIA-SUICIDE SEVERITY RATING SCALE - C-SSRS
1. WITHIN THE PAST MONTH, HAVE YOU WISHED YOU WERE DEAD OR WISHED YOU COULD GO TO SLEEP AND NOT WAKE UP?: YES
3. HAVE YOU BEEN THINKING ABOUT HOW YOU MIGHT KILL YOURSELF?: NO
6. HAVE YOU EVER DONE ANYTHING, STARTED TO DO ANYTHING, OR PREPARED TO DO ANYTHING TO END YOUR LIFE?: NO
4. HAVE YOU HAD THESE THOUGHTS AND HAD SOME INTENTION OF ACTING ON THEM?: NO
2. HAVE YOU ACTUALLY HAD ANY THOUGHTS OF KILLING YOURSELF?: YES
5. HAVE YOU STARTED TO WORK OUT OR WORKED OUT THE DETAILS OF HOW TO KILL YOURSELF? DO YOU INTEND TO CARRY OUT THIS PLAN?: NO

## 2023-04-24 NOTE — PROGRESS NOTES
TAKE TWO CAPSULES BY MOUTH DAILY, Disp: 60 capsule, Rfl: 5    acetaZOLAMIDE (DIAMOX) 250 MG tablet, TAKE ONE TABLET BY MOUTH TWICE A DAY, Disp: 60 tablet, Rfl: 5    levothyroxine (SYNTHROID) 125 MCG tablet, Take 1 tablet by mouth Daily, Disp: 30 tablet, Rfl: 5    clindamycin (CLEOCIN T) 1 % external solution, Apply to affected areas twice daily. , Disp: 60 mL, Rfl: 4    acetaminophen (APAP EXTRA STRENGTH) 500 MG tablet, Take 2 tablets by mouth every 6 hours as needed for Pain, Disp: 120 tablet, Rfl: 3    spironolactone (ALDACTONE) 25 MG tablet, TAKE ONE TABLET BY MOUTH DAILY (Patient not taking: Reported on 4/24/2023), Disp: 30 tablet, Rfl: 2  Allergies   Allergen Reactions    Minocycline Other (See Comments)     Headache and dizziness    Nsaids Other (See Comments)     KIDNEY FAILURE     Review of Systems   Constitutional:  Negative for chills and fatigue. HENT:  Negative for congestion. Respiratory:  Negative for cough and shortness of breath. Cardiovascular:  Negative for chest pain and palpitations. Gastrointestinal:  Negative for blood in stool, constipation and diarrhea. Genitourinary:  Negative for dysuria and urgency. Psychiatric/Behavioral:  Negative for behavioral problems, sleep disturbance and suicidal ideas. Vitals:    04/24/23 1305   BP: 136/84   Pulse: 68   Temp: 97.7 °F (36.5 °C)   SpO2: 99%       Physical Exam  Vitals reviewed. Constitutional:       General: She is not in acute distress. Appearance: Normal appearance. She is well-developed. She is obese. She is not ill-appearing, toxic-appearing or diaphoretic. HENT:      Head: Normocephalic and atraumatic. Right Ear: Tympanic membrane, ear canal and external ear normal.      Left Ear: Tympanic membrane, ear canal and external ear normal.      Nose: No nasal deformity or rhinorrhea. Mouth/Throat:      Mouth: No lacerations or oral lesions. Dentition: Does not have dentures.       Pharynx: No oropharyngeal

## 2023-04-25 LAB
ALBUMIN SERPL-MCNC: 4.5 G/DL (ref 3.4–5)
ALBUMIN/GLOB SERPL: 1.9 {RATIO} (ref 1.1–2.2)
ALP SERPL-CCNC: 88 U/L (ref 40–129)
ALT SERPL-CCNC: 8 U/L (ref 10–40)
ANION GAP SERPL CALCULATED.3IONS-SCNC: 15 MMOL/L (ref 3–16)
AST SERPL-CCNC: 11 U/L (ref 15–37)
BILIRUB SERPL-MCNC: <0.2 MG/DL (ref 0–1)
BUN SERPL-MCNC: 10 MG/DL (ref 7–20)
CALCIUM SERPL-MCNC: 9.5 MG/DL (ref 8.3–10.6)
CHLORIDE SERPL-SCNC: 108 MMOL/L (ref 99–110)
CHOLEST SERPL-MCNC: 249 MG/DL (ref 0–199)
CO2 SERPL-SCNC: 17 MMOL/L (ref 21–32)
CREAT SERPL-MCNC: 0.8 MG/DL (ref 0.6–1.1)
EST. AVERAGE GLUCOSE BLD GHB EST-MCNC: 105.4 MG/DL
GFR SERPLBLD CREATININE-BSD FMLA CKD-EPI: >60 ML/MIN/{1.73_M2}
GLUCOSE SERPL-MCNC: 100 MG/DL (ref 70–99)
HBA1C MFR BLD: 5.3 %
HDLC SERPL-MCNC: 66 MG/DL (ref 40–60)
LDLC SERPL CALC-MCNC: 152 MG/DL
POTASSIUM SERPL-SCNC: 4.2 MMOL/L (ref 3.5–5.1)
PROT SERPL-MCNC: 6.9 G/DL (ref 6.4–8.2)
SODIUM SERPL-SCNC: 140 MMOL/L (ref 136–145)
T4 FREE SERPL-MCNC: 0.8 NG/DL (ref 0.9–1.8)
TRIGL SERPL-MCNC: 156 MG/DL (ref 0–150)
TSH SERPL DL<=0.005 MIU/L-ACNC: 11.23 UIU/ML (ref 0.27–4.2)
VLDLC SERPL CALC-MCNC: 31 MG/DL

## 2023-04-26 LAB
SHBG SERPL-SCNC: 51 NMOL/L (ref 30–135)
TESTOST FREE SERPL-MCNC: 3.1 PG/ML (ref 1.3–9.2)
TESTOST SERPL-MCNC: 23 NG/DL (ref 20–70)

## 2023-04-27 ENCOUNTER — OFFICE VISIT (OUTPATIENT)
Dept: PSYCHIATRY | Age: 43
End: 2023-04-27
Payer: COMMERCIAL

## 2023-04-27 VITALS
SYSTOLIC BLOOD PRESSURE: 126 MMHG | WEIGHT: 291 LBS | HEART RATE: 64 BPM | OXYGEN SATURATION: 99 % | TEMPERATURE: 97.6 F | BODY MASS INDEX: 44.25 KG/M2 | DIASTOLIC BLOOD PRESSURE: 84 MMHG

## 2023-04-27 DIAGNOSIS — F41.1 GENERALIZED ANXIETY DISORDER: ICD-10-CM

## 2023-04-27 DIAGNOSIS — F90.2 ADHD (ATTENTION DEFICIT HYPERACTIVITY DISORDER), COMBINED TYPE: Primary | ICD-10-CM

## 2023-04-27 DIAGNOSIS — F11.21 OPIOID USE DISORDER, MODERATE, IN SUSTAINED REMISSION (HCC): ICD-10-CM

## 2023-04-27 DIAGNOSIS — E03.9 HYPOTHYROIDISM (ACQUIRED): Chronic | ICD-10-CM

## 2023-04-27 DIAGNOSIS — F33.1 MODERATE EPISODE OF RECURRENT MAJOR DEPRESSIVE DISORDER (HCC): ICD-10-CM

## 2023-04-27 PROCEDURE — 4004F PT TOBACCO SCREEN RCVD TLK: CPT | Performed by: PSYCHIATRY & NEUROLOGY

## 2023-04-27 PROCEDURE — G8417 CALC BMI ABV UP PARAM F/U: HCPCS | Performed by: PSYCHIATRY & NEUROLOGY

## 2023-04-27 PROCEDURE — 99214 OFFICE O/P EST MOD 30 MIN: CPT | Performed by: PSYCHIATRY & NEUROLOGY

## 2023-04-27 PROCEDURE — G8427 DOCREV CUR MEDS BY ELIG CLIN: HCPCS | Performed by: PSYCHIATRY & NEUROLOGY

## 2023-04-27 RX ORDER — BUPROPION HYDROCHLORIDE 75 MG/1
37.5 TABLET ORAL 2 TIMES DAILY
Qty: 30 TABLET | Refills: 1 | Status: SHIPPED | OUTPATIENT
Start: 2023-04-27

## 2023-04-27 RX ORDER — LEVOTHYROXINE SODIUM 0.15 MG/1
150 TABLET ORAL DAILY
Qty: 90 TABLET | Refills: 1 | Status: SHIPPED | OUTPATIENT
Start: 2023-04-27

## 2023-04-27 RX ORDER — METHYLPHENIDATE HYDROCHLORIDE 36 MG/1
36 TABLET ORAL DAILY
Qty: 30 TABLET | Refills: 0 | Status: SHIPPED | OUTPATIENT
Start: 2023-05-04 | End: 2023-06-03

## 2023-04-27 RX ORDER — CLONAZEPAM 0.5 MG/1
0.25 TABLET ORAL 2 TIMES DAILY PRN
Qty: 15 TABLET | Refills: 0 | Status: SHIPPED | OUTPATIENT
Start: 2023-04-27 | End: 2023-05-27

## 2023-04-27 ASSESSMENT — PATIENT HEALTH QUESTIONNAIRE - PHQ9
SUM OF ALL RESPONSES TO PHQ QUESTIONS 1-9: 8
2. FEELING DOWN, DEPRESSED OR HOPELESS: 1
SUM OF ALL RESPONSES TO PHQ QUESTIONS 1-9: 8
3. TROUBLE FALLING OR STAYING ASLEEP: 2
10. IF YOU CHECKED OFF ANY PROBLEMS, HOW DIFFICULT HAVE THESE PROBLEMS MADE IT FOR YOU TO DO YOUR WORK, TAKE CARE OF THINGS AT HOME, OR GET ALONG WITH OTHER PEOPLE: 1
8. MOVING OR SPEAKING SO SLOWLY THAT OTHER PEOPLE COULD HAVE NOTICED. OR THE OPPOSITE, BEING SO FIGETY OR RESTLESS THAT YOU HAVE BEEN MOVING AROUND A LOT MORE THAN USUAL: 0
6. FEELING BAD ABOUT YOURSELF - OR THAT YOU ARE A FAILURE OR HAVE LET YOURSELF OR YOUR FAMILY DOWN: 1
5. POOR APPETITE OR OVEREATING: 0
4. FEELING TIRED OR HAVING LITTLE ENERGY: 2
9. THOUGHTS THAT YOU WOULD BE BETTER OFF DEAD, OR OF HURTING YOURSELF: 1
7. TROUBLE CONCENTRATING ON THINGS, SUCH AS READING THE NEWSPAPER OR WATCHING TELEVISION: 0
SUM OF ALL RESPONSES TO PHQ QUESTIONS 1-9: 8
SUM OF ALL RESPONSES TO PHQ QUESTIONS 1-9: 7
SUM OF ALL RESPONSES TO PHQ9 QUESTIONS 1 & 2: 2
1. LITTLE INTEREST OR PLEASURE IN DOING THINGS: 1

## 2023-04-27 ASSESSMENT — ANXIETY QUESTIONNAIRES
2. NOT BEING ABLE TO STOP OR CONTROL WORRYING: 2
3. WORRYING TOO MUCH ABOUT DIFFERENT THINGS: 2
1. FEELING NERVOUS, ANXIOUS, OR ON EDGE: 3
IF YOU CHECKED OFF ANY PROBLEMS ON THIS QUESTIONNAIRE, HOW DIFFICULT HAVE THESE PROBLEMS MADE IT FOR YOU TO DO YOUR WORK, TAKE CARE OF THINGS AT HOME, OR GET ALONG WITH OTHER PEOPLE: SOMEWHAT DIFFICULT
6. BECOMING EASILY ANNOYED OR IRRITABLE: 2
GAD7 TOTAL SCORE: 10
5. BEING SO RESTLESS THAT IT IS HARD TO SIT STILL: 0
7. FEELING AFRAID AS IF SOMETHING AWFUL MIGHT HAPPEN: 1
4. TROUBLE RELAXING: 0

## 2023-04-27 NOTE — PROGRESS NOTES
PSYCHIATRY PROGRESS NOTE        France Ribeiro  1980  4/27/2023  PCP: Khang Marin MD      CC:   Chief Complaint   Patient presents with    Follow-up     S:   Pt reports some improvement since last visit. The concerta has helped her feel more stable and focused. She also feels better that Chiara Freedman is going to the next grade, and their car is repaired. She still is struggling with anxiety, feeling anger and rage at times. She does feel she can recognize it more and try to be more introspective as the reasons and step away from her situation to calm down, but she has a hard time recognizing how her situation can start to build up her level of frustration and make changes proactively. It seems when there are a lot of changes, noises around her, and a lot of demands on attention/focus she can get increasingly frustrated and irritable. She found the clonazepam helpful, doesn't feel it \"kick in\" like the valium, but does feel it work to control her level of frustration in these moments. She took one per day. She still feels something is missing and there has been a change with how her depression has been. She thinks it may be worth trying to wellbutrin again incase this was the cause. She continues to have a lot of mood changes during her menstrual cycle. This is a long standing issues. Never on OCPs. She has heavy menstrual bleeding. She's had her tubes tied. ROS:  No cp, no palpitations    Brief Medical Hx:   Pseudotumor cerebri, Hypothyroidism, morbid obesity, hidrandenitis suppurativa    Brief Psych Hx:  Med trials: lexapro, celexa, paxil (could not tolerate any of these, nausea), duloxetine (did not tolerate). Sertraline - weight gain.  Been on diazepam 5mg BID for over 10 yrs  NSSI: did cut self 4 times in late teens, but denies intent to end life    Current Psychiatric Medications:  Concerta 11CD daily  Fluoxetine 40mg daily  Clonazepam 0.25-0.5 qday prn anxiety    O:  Vitals:    04/27/23 1101

## 2023-04-28 LAB — ANDROST SERPL-MCNC: 0.36 NG/ML (ref 0.13–0.82)

## 2023-05-01 ENCOUNTER — HOSPITAL ENCOUNTER (OUTPATIENT)
Dept: ULTRASOUND IMAGING | Age: 43
Discharge: HOME | End: 2023-05-01
Payer: COMMERCIAL

## 2023-05-01 ENCOUNTER — HOSPITAL ENCOUNTER (OUTPATIENT)
Dept: GENERAL RADIOLOGY | Age: 43
Discharge: HOME | End: 2023-05-01
Payer: COMMERCIAL

## 2023-05-01 ENCOUNTER — HOSPITAL ENCOUNTER (OUTPATIENT)
Age: 43
Discharge: HOME | End: 2023-05-01
Payer: COMMERCIAL

## 2023-05-01 DIAGNOSIS — E28.2 POLYCYSTIC OVARIAN SYNDROME: ICD-10-CM

## 2023-05-01 DIAGNOSIS — M54.32 SCIATICA OF LEFT SIDE: ICD-10-CM

## 2023-05-01 PROCEDURE — 76856 US EXAM PELVIC COMPLETE: CPT

## 2023-05-01 PROCEDURE — 72100 X-RAY EXAM L-S SPINE 2/3 VWS: CPT

## 2023-05-23 ENCOUNTER — PATIENT MESSAGE (OUTPATIENT)
Dept: PSYCHIATRY | Age: 43
End: 2023-05-23

## 2023-05-23 ENCOUNTER — OFFICE VISIT (OUTPATIENT)
Dept: PSYCHIATRY | Age: 43
End: 2023-05-23
Payer: COMMERCIAL

## 2023-05-23 VITALS — HEART RATE: 80 BPM

## 2023-05-23 DIAGNOSIS — F41.1 GENERALIZED ANXIETY DISORDER: Primary | ICD-10-CM

## 2023-05-23 DIAGNOSIS — F33.1 MODERATE EPISODE OF RECURRENT MAJOR DEPRESSIVE DISORDER (HCC): ICD-10-CM

## 2023-05-23 DIAGNOSIS — F11.21 OPIOID USE DISORDER, MODERATE, IN SUSTAINED REMISSION (HCC): ICD-10-CM

## 2023-05-23 DIAGNOSIS — F90.2 ADHD (ATTENTION DEFICIT HYPERACTIVITY DISORDER), COMBINED TYPE: Primary | ICD-10-CM

## 2023-05-23 DIAGNOSIS — F90.2 ADHD (ATTENTION DEFICIT HYPERACTIVITY DISORDER), COMBINED TYPE: ICD-10-CM

## 2023-05-23 PROCEDURE — 99214 OFFICE O/P EST MOD 30 MIN: CPT | Performed by: PSYCHIATRY & NEUROLOGY

## 2023-05-23 PROCEDURE — 4004F PT TOBACCO SCREEN RCVD TLK: CPT | Performed by: PSYCHIATRY & NEUROLOGY

## 2023-05-23 PROCEDURE — G8428 CUR MEDS NOT DOCUMENT: HCPCS | Performed by: PSYCHIATRY & NEUROLOGY

## 2023-05-23 PROCEDURE — G8417 CALC BMI ABV UP PARAM F/U: HCPCS | Performed by: PSYCHIATRY & NEUROLOGY

## 2023-05-23 RX ORDER — METHYLPHENIDATE HYDROCHLORIDE 54 MG/1
54 TABLET ORAL DAILY
Qty: 30 TABLET | Refills: 0 | Status: SHIPPED | OUTPATIENT
Start: 2023-06-04 | End: 2023-07-04

## 2023-05-23 RX ORDER — CLONAZEPAM 0.5 MG/1
0.25 TABLET ORAL 2 TIMES DAILY PRN
Qty: 15 TABLET | Refills: 1 | Status: SHIPPED | OUTPATIENT
Start: 2023-05-27 | End: 2023-07-26

## 2023-05-23 RX ORDER — GUANFACINE 1 MG/1
1 TABLET, EXTENDED RELEASE ORAL NIGHTLY
Qty: 30 TABLET | Refills: 1 | Status: SHIPPED | OUTPATIENT
Start: 2023-05-23

## 2023-05-23 NOTE — PROGRESS NOTES
PSYCHIATRY PROGRESS NOTE        Mervat Persaud  1980  05/23/2023  PCP: Mireya Sanchez MD      CC:   Chief Complaint   Patient presents with    Anxiety    Depression     S:   Edilia Alaniz hasn't noticed a substantial improvement with the wellbutrin. She is noticing a slight sense of palpitations at times, not as much as she was at the higher dose in the past.   She feels a lack of motivation, low mood, easily irritated especially when over stimulated by her environment and having many people around her. There is a lack of structure in her job compared to her last job. She feels a tendency to want to avoid people but then feels terrible sad and guilty when she is alone. She still feels very reliant on the clonazepam to give her a sense of calm and feels she is more productive and focused when she does take it. Still stuggling with task initiation, follow through, procrastination. Working at [employer] in [employer]. Is on a higher synthroid dose now. ROS:  No cp, occ mild palpitations    Brief Medical Hx:   Pseudotumor cerebri, Hypothyroidism, morbid obesity, hidrandenitis suppurativa    Brief Psych Hx:  Med trials: lexapro, celexa, paxil (could not tolerate any of these, nausea), duloxetine (did not tolerate). Sertraline - weight gain. Been on diazepam 5mg BID for over 10 yrs  NSSI: did cut self 4 times in late teens, but denies intent to end life    Current Psychiatric Medications:  Concerta 09YQ daily  Fluoxetine 40mg daily  Wellbutrin 37.5mg BID  Clonazepam 0.25-0.5 qday prn anxiety    O:  Vitals:    05/23/23 1020   Pulse: 80             Mental Status Exam:   Appearance    No acute distress, well dressed and groomed,   Motor: No abnormal movements, tics or mannerisms.   Speech   normal rate, rhythm, and vol  Mood/Affect  ok / full quality, good range  Thought Process    linear, goal directed and coherent  Thought Content  future oriented, no SI, intent or plan  Associations    logical

## 2023-05-30 NOTE — PATIENT INSTRUCTIONS

## 2023-06-05 RX ORDER — METHYLPHENIDATE HYDROCHLORIDE 18 MG/1
18 TABLET ORAL DAILY
Qty: 30 TABLET | Refills: 0 | Status: SHIPPED | OUTPATIENT
Start: 2023-06-05 | End: 2023-07-05

## 2023-06-05 RX ORDER — METHYLPHENIDATE HYDROCHLORIDE 36 MG/1
36 TABLET ORAL DAILY
Qty: 30 TABLET | Refills: 0 | Status: SHIPPED | OUTPATIENT
Start: 2023-06-05 | End: 2023-07-05

## 2023-06-05 NOTE — TELEPHONE ENCOUNTER
From: Ren Osman  To: Dr. Bonds Axon: 5/23/2023 12:05 PM EDT  Subject: Pharmacy communication     Just got off the phone with the pharmacy, they said they didn't get the colonopin. I'm waiting to go  all my meds together; they time to contact you for the refill. Happens every single time, it makes me feel like such a bother, I am so sorry.

## 2023-06-12 DIAGNOSIS — E03.9 HYPOTHYROIDISM (ACQUIRED): ICD-10-CM

## 2023-06-12 LAB
T4 FREE SERPL-MCNC: 1.7 NG/DL (ref 0.9–1.8)
TSH SERPL DL<=0.005 MIU/L-ACNC: 0.14 UIU/ML (ref 0.27–4.2)

## 2023-06-14 DIAGNOSIS — M54.17 LUMBOSACRAL RADICULOPATHY AT L5: ICD-10-CM

## 2023-06-14 DIAGNOSIS — M54.32 SCIATICA OF LEFT SIDE: Primary | ICD-10-CM

## 2023-06-30 ENCOUNTER — INITIAL CONSULT (OUTPATIENT)
Dept: PSYCHIATRY | Age: 43
End: 2023-06-30
Payer: COMMERCIAL

## 2023-06-30 VITALS
SYSTOLIC BLOOD PRESSURE: 126 MMHG | HEIGHT: 68 IN | HEART RATE: 84 BPM | RESPIRATION RATE: 16 BRPM | WEIGHT: 289 LBS | BODY MASS INDEX: 43.8 KG/M2 | OXYGEN SATURATION: 98 % | DIASTOLIC BLOOD PRESSURE: 84 MMHG

## 2023-06-30 DIAGNOSIS — F41.1 GENERALIZED ANXIETY DISORDER: ICD-10-CM

## 2023-06-30 DIAGNOSIS — F90.2 ADHD (ATTENTION DEFICIT HYPERACTIVITY DISORDER), COMBINED TYPE: Primary | ICD-10-CM

## 2023-06-30 PROCEDURE — 99214 OFFICE O/P EST MOD 30 MIN: CPT | Performed by: PSYCHIATRY & NEUROLOGY

## 2023-06-30 PROCEDURE — 4004F PT TOBACCO SCREEN RCVD TLK: CPT | Performed by: PSYCHIATRY & NEUROLOGY

## 2023-06-30 PROCEDURE — G8417 CALC BMI ABV UP PARAM F/U: HCPCS | Performed by: PSYCHIATRY & NEUROLOGY

## 2023-06-30 PROCEDURE — G8427 DOCREV CUR MEDS BY ELIG CLIN: HCPCS | Performed by: PSYCHIATRY & NEUROLOGY

## 2023-06-30 RX ORDER — GUANFACINE 1 MG/1
1 TABLET, EXTENDED RELEASE ORAL NIGHTLY
Qty: 30 TABLET | Refills: 1 | Status: SHIPPED | OUTPATIENT
Start: 2023-06-30

## 2023-06-30 RX ORDER — CLONAZEPAM 1 MG/1
.5-1 TABLET ORAL DAILY PRN
Qty: 30 TABLET | Refills: 1 | Status: SHIPPED | OUTPATIENT
Start: 2023-07-03 | End: 2023-09-01

## 2023-06-30 ASSESSMENT — ANXIETY QUESTIONNAIRES
IF YOU CHECKED OFF ANY PROBLEMS ON THIS QUESTIONNAIRE, HOW DIFFICULT HAVE THESE PROBLEMS MADE IT FOR YOU TO DO YOUR WORK, TAKE CARE OF THINGS AT HOME, OR GET ALONG WITH OTHER PEOPLE: SOMEWHAT DIFFICULT
3. WORRYING TOO MUCH ABOUT DIFFERENT THINGS: 1
GAD7 TOTAL SCORE: 4
6. BECOMING EASILY ANNOYED OR IRRITABLE: 1
7. FEELING AFRAID AS IF SOMETHING AWFUL MIGHT HAPPEN: 0
2. NOT BEING ABLE TO STOP OR CONTROL WORRYING: 1
4. TROUBLE RELAXING: 0
5. BEING SO RESTLESS THAT IT IS HARD TO SIT STILL: 0
1. FEELING NERVOUS, ANXIOUS, OR ON EDGE: 1

## 2023-06-30 ASSESSMENT — PATIENT HEALTH QUESTIONNAIRE - PHQ9
10. IF YOU CHECKED OFF ANY PROBLEMS, HOW DIFFICULT HAVE THESE PROBLEMS MADE IT FOR YOU TO DO YOUR WORK, TAKE CARE OF THINGS AT HOME, OR GET ALONG WITH OTHER PEOPLE: 1
SUM OF ALL RESPONSES TO PHQ QUESTIONS 1-9: 5
2. FEELING DOWN, DEPRESSED OR HOPELESS: 1
7. TROUBLE CONCENTRATING ON THINGS, SUCH AS READING THE NEWSPAPER OR WATCHING TELEVISION: 0
SUM OF ALL RESPONSES TO PHQ9 QUESTIONS 1 & 2: 2
9. THOUGHTS THAT YOU WOULD BE BETTER OFF DEAD, OR OF HURTING YOURSELF: 1
6. FEELING BAD ABOUT YOURSELF - OR THAT YOU ARE A FAILURE OR HAVE LET YOURSELF OR YOUR FAMILY DOWN: 1
SUM OF ALL RESPONSES TO PHQ QUESTIONS 1-9: 6
5. POOR APPETITE OR OVEREATING: 0
8. MOVING OR SPEAKING SO SLOWLY THAT OTHER PEOPLE COULD HAVE NOTICED. OR THE OPPOSITE, BEING SO FIGETY OR RESTLESS THAT YOU HAVE BEEN MOVING AROUND A LOT MORE THAN USUAL: 0
SUM OF ALL RESPONSES TO PHQ QUESTIONS 1-9: 6
4. FEELING TIRED OR HAVING LITTLE ENERGY: 1
3. TROUBLE FALLING OR STAYING ASLEEP: 1
SUM OF ALL RESPONSES TO PHQ QUESTIONS 1-9: 6
1. LITTLE INTEREST OR PLEASURE IN DOING THINGS: 1

## 2023-07-19 ENCOUNTER — OFFICE VISIT (OUTPATIENT)
Dept: NEUROLOGY | Age: 43
End: 2023-07-19
Payer: COMMERCIAL

## 2023-07-19 VITALS
DIASTOLIC BLOOD PRESSURE: 88 MMHG | HEART RATE: 64 BPM | SYSTOLIC BLOOD PRESSURE: 123 MMHG | WEIGHT: 278 LBS | BODY MASS INDEX: 42.27 KG/M2

## 2023-07-19 DIAGNOSIS — G93.2 PSEUDOTUMOR CEREBRI: Primary | ICD-10-CM

## 2023-07-19 PROCEDURE — 4004F PT TOBACCO SCREEN RCVD TLK: CPT | Performed by: PSYCHIATRY & NEUROLOGY

## 2023-07-19 PROCEDURE — G8427 DOCREV CUR MEDS BY ELIG CLIN: HCPCS | Performed by: PSYCHIATRY & NEUROLOGY

## 2023-07-19 PROCEDURE — G8417 CALC BMI ABV UP PARAM F/U: HCPCS | Performed by: PSYCHIATRY & NEUROLOGY

## 2023-07-19 PROCEDURE — 99213 OFFICE O/P EST LOW 20 MIN: CPT | Performed by: PSYCHIATRY & NEUROLOGY

## 2023-07-19 RX ORDER — ACETAZOLAMIDE 250 MG/1
TABLET ORAL
Qty: 60 TABLET | Refills: 5 | Status: SHIPPED | OUTPATIENT
Start: 2023-07-19
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
## 2023-08-10 ENCOUNTER — OFFICE VISIT (OUTPATIENT)
Dept: PSYCHIATRY | Age: 43
End: 2023-08-10
Payer: COMMERCIAL

## 2023-08-10 DIAGNOSIS — F90.2 ADHD (ATTENTION DEFICIT HYPERACTIVITY DISORDER), COMBINED TYPE: ICD-10-CM

## 2023-08-10 DIAGNOSIS — F41.1 GENERALIZED ANXIETY DISORDER: Primary | ICD-10-CM

## 2023-08-10 DIAGNOSIS — F33.42 RECURRENT MAJOR DEPRESSIVE DISORDER, IN FULL REMISSION (HCC): ICD-10-CM

## 2023-08-10 PROCEDURE — 4004F PT TOBACCO SCREEN RCVD TLK: CPT | Performed by: PSYCHIATRY & NEUROLOGY

## 2023-08-10 PROCEDURE — G8428 CUR MEDS NOT DOCUMENT: HCPCS | Performed by: PSYCHIATRY & NEUROLOGY

## 2023-08-10 PROCEDURE — 99214 OFFICE O/P EST MOD 30 MIN: CPT | Performed by: PSYCHIATRY & NEUROLOGY

## 2023-08-10 PROCEDURE — G8417 CALC BMI ABV UP PARAM F/U: HCPCS | Performed by: PSYCHIATRY & NEUROLOGY

## 2023-08-10 RX ORDER — FLUOXETINE HYDROCHLORIDE 20 MG/1
CAPSULE ORAL
Qty: 60 CAPSULE | Refills: 5 | Status: SHIPPED | OUTPATIENT
Start: 2023-08-10

## 2023-08-10 RX ORDER — CLONAZEPAM 0.5 MG/1
0.5 TABLET ORAL 2 TIMES DAILY PRN
Qty: 50 TABLET | Refills: 0 | Status: SHIPPED | OUTPATIENT
Start: 2023-08-30 | End: 2023-09-29

## 2023-08-10 RX ORDER — GUANFACINE 1 MG/1
1 TABLET, EXTENDED RELEASE ORAL NIGHTLY
Qty: 30 TABLET | Refills: 1 | Status: SHIPPED | OUTPATIENT
Start: 2023-08-10

## 2023-08-10 ASSESSMENT — ANXIETY QUESTIONNAIRES
6. BECOMING EASILY ANNOYED OR IRRITABLE: 1
7. FEELING AFRAID AS IF SOMETHING AWFUL MIGHT HAPPEN: 0
2. NOT BEING ABLE TO STOP OR CONTROL WORRYING: 1
IF YOU CHECKED OFF ANY PROBLEMS ON THIS QUESTIONNAIRE, HOW DIFFICULT HAVE THESE PROBLEMS MADE IT FOR YOU TO DO YOUR WORK, TAKE CARE OF THINGS AT HOME, OR GET ALONG WITH OTHER PEOPLE: SOMEWHAT DIFFICULT
3. WORRYING TOO MUCH ABOUT DIFFERENT THINGS: 1
1. FEELING NERVOUS, ANXIOUS, OR ON EDGE: 1
GAD7 TOTAL SCORE: 4
5. BEING SO RESTLESS THAT IT IS HARD TO SIT STILL: 0
4. TROUBLE RELAXING: 0

## 2023-08-10 ASSESSMENT — PATIENT HEALTH QUESTIONNAIRE - PHQ9
SUM OF ALL RESPONSES TO PHQ QUESTIONS 1-9: 3
4. FEELING TIRED OR HAVING LITTLE ENERGY: 1
2. FEELING DOWN, DEPRESSED OR HOPELESS: 1
9. THOUGHTS THAT YOU WOULD BE BETTER OFF DEAD, OR OF HURTING YOURSELF: 0
1. LITTLE INTEREST OR PLEASURE IN DOING THINGS: 0
3. TROUBLE FALLING OR STAYING ASLEEP: 0
SUM OF ALL RESPONSES TO PHQ QUESTIONS 1-9: 3
5. POOR APPETITE OR OVEREATING: 0
6. FEELING BAD ABOUT YOURSELF - OR THAT YOU ARE A FAILURE OR HAVE LET YOURSELF OR YOUR FAMILY DOWN: 1
SUM OF ALL RESPONSES TO PHQ QUESTIONS 1-9: 3
SUM OF ALL RESPONSES TO PHQ QUESTIONS 1-9: 3
8. MOVING OR SPEAKING SO SLOWLY THAT OTHER PEOPLE COULD HAVE NOTICED. OR THE OPPOSITE, BEING SO FIGETY OR RESTLESS THAT YOU HAVE BEEN MOVING AROUND A LOT MORE THAN USUAL: 0
SUM OF ALL RESPONSES TO PHQ9 QUESTIONS 1 & 2: 1
7. TROUBLE CONCENTRATING ON THINGS, SUCH AS READING THE NEWSPAPER OR WATCHING TELEVISION: 0
10. IF YOU CHECKED OFF ANY PROBLEMS, HOW DIFFICULT HAVE THESE PROBLEMS MADE IT FOR YOU TO DO YOUR WORK, TAKE CARE OF THINGS AT HOME, OR GET ALONG WITH OTHER PEOPLE: 1

## 2023-08-10 NOTE — PROGRESS NOTES
PSYCHIATRY PROGRESS NOTE        Smith Addison  1980  08/10/2023  PCP: Sadie Miranda MD      CC:   Chief Complaint   Patient presents with    Depression    Anxiety    ADHD     S:   Most of todays conversation centered around clonazepam. It is clear patient is heavily reliant on clonazepam as a means to feel balanced and that she can engage with people. Without it she feels distant and irritated with others. She runs out of the medication early usually, and went on vacation without any clonazepam and felt pretty low and unable to enjoy things. She has a hard time articulating any other factors that influence these feelings such as thoughts, environments, etc. She recognizes abrupt discontinuation will make these issues acutely worse. She continues to not use any treatment for her FROY. Vyvanse is working well for her focus. She doesn't feel noises and sounds around her bother her. She, however, doesn't feel as much energy on vvyanse compared to concerta. ROS:  No cp, no palpitations    Brief Medical Hx:   Pseudotumor cerebri, Hypothyroidism, morbid obesity, hidrandenitis suppurativa    Brief Psych Hx:  Med trials: lexapro, celexa, paxil (could not tolerate any of these, nausea), duloxetine (did not tolerate). Sertraline - weight gain. Been on diazepam 5mg BID for over 10 yrs  NSSI: did cut self 4 times in late teens, but denies intent to end life    Current Psychiatric Medications:  Vyvanse 30mg daily  Fluoxetine 40mg daily  Clonazepam 0.5mg - 1mg prn (#30 1mg tabs per month)    O:  There were no vitals filed for this visit. Mental Status Exam:   Appearance    No acute distress, well dressed and groomed,   Motor: No abnormal movements, tics or mannerisms.   Speech normal rate, rhythm and vol  Mood/Affect  anxious / tearful  Thought Process    mostly linear, at times tangential.   Thought Content  future oriented, no SI, intent or plan  Associations    logical

## 2023-08-21 ENCOUNTER — PATIENT MESSAGE (OUTPATIENT)
Dept: PSYCHIATRY | Age: 43
End: 2023-08-21

## 2023-08-21 DIAGNOSIS — G47.33 OBSTRUCTIVE SLEEP APNEA: Primary | ICD-10-CM

## 2023-08-22 NOTE — TELEPHONE ENCOUNTER
Addended by: PEDRO SALDANA on: 2/20/2020 09:51 AM     Modules accepted: Orders     Patient requesting an updated referral to sleep medicine     Please advise

## 2023-09-12 ENCOUNTER — OFFICE VISIT (OUTPATIENT)
Dept: PSYCHIATRY | Age: 43
End: 2023-09-12
Payer: COMMERCIAL

## 2023-09-12 DIAGNOSIS — F41.1 GENERALIZED ANXIETY DISORDER: ICD-10-CM

## 2023-09-12 DIAGNOSIS — F33.41 RECURRENT MAJOR DEPRESSIVE DISORDER, IN PARTIAL REMISSION (HCC): ICD-10-CM

## 2023-09-12 DIAGNOSIS — F90.2 ADHD (ATTENTION DEFICIT HYPERACTIVITY DISORDER), COMBINED TYPE: Primary | ICD-10-CM

## 2023-09-12 PROCEDURE — G8428 CUR MEDS NOT DOCUMENT: HCPCS | Performed by: PSYCHIATRY & NEUROLOGY

## 2023-09-12 PROCEDURE — 99214 OFFICE O/P EST MOD 30 MIN: CPT | Performed by: PSYCHIATRY & NEUROLOGY

## 2023-09-12 PROCEDURE — 4004F PT TOBACCO SCREEN RCVD TLK: CPT | Performed by: PSYCHIATRY & NEUROLOGY

## 2023-09-12 PROCEDURE — G8417 CALC BMI ABV UP PARAM F/U: HCPCS | Performed by: PSYCHIATRY & NEUROLOGY

## 2023-09-12 RX ORDER — CLONAZEPAM 0.5 MG/1
0.5 TABLET ORAL 2 TIMES DAILY PRN
Qty: 10 TABLET | Refills: 4 | Status: SHIPPED | OUTPATIENT
Start: 2023-10-01 | End: 2023-10-29

## 2023-09-12 RX ORDER — GUANFACINE 1 MG/1
1 TABLET, EXTENDED RELEASE ORAL NIGHTLY
Qty: 30 TABLET | Refills: 5 | Status: SHIPPED | OUTPATIENT
Start: 2023-09-12

## 2023-09-12 ASSESSMENT — PATIENT HEALTH QUESTIONNAIRE - PHQ9
1. LITTLE INTEREST OR PLEASURE IN DOING THINGS: 1
2. FEELING DOWN, DEPRESSED OR HOPELESS: 0
10. IF YOU CHECKED OFF ANY PROBLEMS, HOW DIFFICULT HAVE THESE PROBLEMS MADE IT FOR YOU TO DO YOUR WORK, TAKE CARE OF THINGS AT HOME, OR GET ALONG WITH OTHER PEOPLE: 1
7. TROUBLE CONCENTRATING ON THINGS, SUCH AS READING THE NEWSPAPER OR WATCHING TELEVISION: 1
5. POOR APPETITE OR OVEREATING: 1
6. FEELING BAD ABOUT YOURSELF - OR THAT YOU ARE A FAILURE OR HAVE LET YOURSELF OR YOUR FAMILY DOWN: 2
SUM OF ALL RESPONSES TO PHQ9 QUESTIONS 1 & 2: 1
9. THOUGHTS THAT YOU WOULD BE BETTER OFF DEAD, OR OF HURTING YOURSELF: 1
8. MOVING OR SPEAKING SO SLOWLY THAT OTHER PEOPLE COULD HAVE NOTICED. OR THE OPPOSITE, BEING SO FIGETY OR RESTLESS THAT YOU HAVE BEEN MOVING AROUND A LOT MORE THAN USUAL: 0
SUM OF ALL RESPONSES TO PHQ QUESTIONS 1-9: 8
4. FEELING TIRED OR HAVING LITTLE ENERGY: 1
SUM OF ALL RESPONSES TO PHQ QUESTIONS 1-9: 8
SUM OF ALL RESPONSES TO PHQ QUESTIONS 1-9: 8
SUM OF ALL RESPONSES TO PHQ QUESTIONS 1-9: 7
3. TROUBLE FALLING OR STAYING ASLEEP: 1

## 2023-09-12 ASSESSMENT — ANXIETY QUESTIONNAIRES
GAD7 TOTAL SCORE: 7
5. BEING SO RESTLESS THAT IT IS HARD TO SIT STILL: 1
IF YOU CHECKED OFF ANY PROBLEMS ON THIS QUESTIONNAIRE, HOW DIFFICULT HAVE THESE PROBLEMS MADE IT FOR YOU TO DO YOUR WORK, TAKE CARE OF THINGS AT HOME, OR GET ALONG WITH OTHER PEOPLE: SOMEWHAT DIFFICULT
6. BECOMING EASILY ANNOYED OR IRRITABLE: 2
1. FEELING NERVOUS, ANXIOUS, OR ON EDGE: 1
3. WORRYING TOO MUCH ABOUT DIFFERENT THINGS: 1
4. TROUBLE RELAXING: 1
2. NOT BEING ABLE TO STOP OR CONTROL WORRYING: 1
7. FEELING AFRAID AS IF SOMETHING AWFUL MIGHT HAPPEN: 0

## 2023-09-12 NOTE — PROGRESS NOTES
ok/ tommy quality, good motility and range  Thought Process    mostly linear, at times tangential.   Thought Content  future oriented, no SI, intent or plan  Associations    logical connections  Attention/Concentration    intact  Memory    recent and remote memory intact  Insight/Judgement    Good / Intact    Labs:     Lab Results   Component Value Date    TSH 0.14 (L) 06/12/2023    TSHREFLEX 1.74 04/04/2023        ASSESSMENT:   42 yo F who struggles with poor frustration tolerance, depression, and anxiety. ADHD tx is helping overall. Main issue is getting her off benzodiazepines at this point which we are slowly reducing. Giving weekly Rx of clonazepam will be better to ensure she uses it appropriately. 1. Generalized anxiety disorder  2. Opioid use disorder, moderate, in remission, on maintenance therapy  3. MDD, recurrent, in partial remission  4. ADHD, combined type  5. FROY  6. PMDD    PLAN:   Reduce clonazepam to 0.5mg tabs BID prn, #10/week. Will reduce subsequently each month. 2.   Continue vyvanse 30mg daily - will check with the pharmacy so they fill the profiled Rx.   3.   Needs to resume FROY treatment if she wants her energy to improve.  Patient plans to call them to schedule    I spent 30 min on this encounter including face to face time, chart review, documentation and orders     Medication Monitoring:    - OARRS reviewed, no issues noted        Follow-up: RTC in 6 weeks      Rosa Frost MD  Psychiatry

## 2023-10-15 DIAGNOSIS — F41.1 GENERALIZED ANXIETY DISORDER: ICD-10-CM

## 2023-10-15 RX ORDER — CLONAZEPAM 1 MG/1
TABLET ORAL
Qty: 30 TABLET | OUTPATIENT
Start: 2023-10-15

## 2023-10-18 ENCOUNTER — OFFICE VISIT (OUTPATIENT)
Dept: INTERNAL MEDICINE CLINIC | Age: 43
End: 2023-10-18
Payer: COMMERCIAL

## 2023-10-18 ENCOUNTER — PATIENT MESSAGE (OUTPATIENT)
Dept: PSYCHIATRY | Age: 43
End: 2023-10-18

## 2023-10-18 VITALS
BODY MASS INDEX: 40.95 KG/M2 | HEIGHT: 68 IN | TEMPERATURE: 97.9 F | RESPIRATION RATE: 16 BRPM | HEART RATE: 64 BPM | SYSTOLIC BLOOD PRESSURE: 120 MMHG | DIASTOLIC BLOOD PRESSURE: 82 MMHG | WEIGHT: 270.2 LBS | OXYGEN SATURATION: 100 %

## 2023-10-18 DIAGNOSIS — E66.01 CLASS 3 SEVERE OBESITY DUE TO EXCESS CALORIES WITH SERIOUS COMORBIDITY AND BODY MASS INDEX (BMI) OF 45.0 TO 49.9 IN ADULT (HCC): Primary | Chronic | ICD-10-CM

## 2023-10-18 DIAGNOSIS — E03.9 HYPOTHYROIDISM (ACQUIRED): Chronic | ICD-10-CM

## 2023-10-18 PROCEDURE — 4004F PT TOBACCO SCREEN RCVD TLK: CPT | Performed by: INTERNAL MEDICINE

## 2023-10-18 PROCEDURE — G8417 CALC BMI ABV UP PARAM F/U: HCPCS | Performed by: INTERNAL MEDICINE

## 2023-10-18 PROCEDURE — G8427 DOCREV CUR MEDS BY ELIG CLIN: HCPCS | Performed by: INTERNAL MEDICINE

## 2023-10-18 PROCEDURE — 99213 OFFICE O/P EST LOW 20 MIN: CPT | Performed by: INTERNAL MEDICINE

## 2023-10-18 PROCEDURE — G8484 FLU IMMUNIZE NO ADMIN: HCPCS | Performed by: INTERNAL MEDICINE

## 2023-10-18 ASSESSMENT — PATIENT HEALTH QUESTIONNAIRE - PHQ9
9. THOUGHTS THAT YOU WOULD BE BETTER OFF DEAD, OR OF HURTING YOURSELF: 0
2. FEELING DOWN, DEPRESSED OR HOPELESS: 0
SUM OF ALL RESPONSES TO PHQ QUESTIONS 1-9: 3
SUM OF ALL RESPONSES TO PHQ9 QUESTIONS 1 & 2: 0
7. TROUBLE CONCENTRATING ON THINGS, SUCH AS READING THE NEWSPAPER OR WATCHING TELEVISION: 0
SUM OF ALL RESPONSES TO PHQ QUESTIONS 1-9: 3
4. FEELING TIRED OR HAVING LITTLE ENERGY: 1
3. TROUBLE FALLING OR STAYING ASLEEP: 1
SUM OF ALL RESPONSES TO PHQ QUESTIONS 1-9: 3
1. LITTLE INTEREST OR PLEASURE IN DOING THINGS: 0
SUM OF ALL RESPONSES TO PHQ QUESTIONS 1-9: 3
5. POOR APPETITE OR OVEREATING: 0
8. MOVING OR SPEAKING SO SLOWLY THAT OTHER PEOPLE COULD HAVE NOTICED. OR THE OPPOSITE, BEING SO FIGETY OR RESTLESS THAT YOU HAVE BEEN MOVING AROUND A LOT MORE THAN USUAL: 0
6. FEELING BAD ABOUT YOURSELF - OR THAT YOU ARE A FAILURE OR HAVE LET YOURSELF OR YOUR FAMILY DOWN: 1

## 2023-10-18 NOTE — TELEPHONE ENCOUNTER
From: Rama Vega  To: Dr. Fely Shane: 10/18/2023 3:46 PM EDT  Subject: Vyvanse     Good afternoon Dr. Teresa Mcgovern,   I'm in an appointment with [facility], it reminded me to ask you to send refills of Vyvanse please, I ran out day before yesterday, and I can feel it. Thank you Sir, hope you and the family are doing wonderful.     Timbo Louis

## 2023-10-20 ENCOUNTER — OFFICE VISIT (OUTPATIENT)
Dept: PULMONOLOGY | Age: 43
End: 2023-10-20

## 2023-10-20 VITALS
BODY MASS INDEX: 40.92 KG/M2 | OXYGEN SATURATION: 99 % | TEMPERATURE: 97.1 F | RESPIRATION RATE: 16 BRPM | HEART RATE: 57 BPM | SYSTOLIC BLOOD PRESSURE: 122 MMHG | DIASTOLIC BLOOD PRESSURE: 78 MMHG | WEIGHT: 270 LBS | HEIGHT: 68 IN

## 2023-10-20 DIAGNOSIS — G47.10 HYPERSOMNIA: ICD-10-CM

## 2023-10-20 DIAGNOSIS — R06.81 WITNESSED EPISODE OF APNEA: ICD-10-CM

## 2023-10-20 DIAGNOSIS — R06.83 SNORING: ICD-10-CM

## 2023-10-20 DIAGNOSIS — G47.33 OSA (OBSTRUCTIVE SLEEP APNEA): Primary | ICD-10-CM

## 2023-10-20 DIAGNOSIS — E66.01 MORBID OBESITY WITH BMI OF 40.0-44.9, ADULT (HCC): ICD-10-CM

## 2023-10-20 ASSESSMENT — SLEEP AND FATIGUE QUESTIONNAIRES
NECK CIRCUMFERENCE (INCHES): 14.7
HOW LIKELY ARE YOU TO NOD OFF OR FALL ASLEEP WHILE SITTING AND READING: 3
HOW LIKELY ARE YOU TO NOD OFF OR FALL ASLEEP WHILE SITTING QUIETLY AFTER LUNCH WITHOUT ALCOHOL: 3
HOW LIKELY ARE YOU TO NOD OFF OR FALL ASLEEP WHEN YOU ARE A PASSENGER IN A CAR FOR AN HOUR WITHOUT A BREAK: 3
ESS TOTAL SCORE: 18
HOW LIKELY ARE YOU TO NOD OFF OR FALL ASLEEP IN A CAR, WHILE STOPPED FOR A FEW MINUTES IN TRAFFIC: 0
HOW LIKELY ARE YOU TO NOD OFF OR FALL ASLEEP WHILE LYING DOWN TO REST IN THE AFTERNOON WHEN CIRCUMSTANCES PERMIT: 3
HOW LIKELY ARE YOU TO NOD OFF OR FALL ASLEEP WHILE WATCHING TV: 3
HOW LIKELY ARE YOU TO NOD OFF OR FALL ASLEEP WHILE SITTING AND TALKING TO SOMEONE: 2
HOW LIKELY ARE YOU TO NOD OFF OR FALL ASLEEP WHILE SITTING INACTIVE IN A PUBLIC PLACE: 1

## 2023-10-20 NOTE — PATIENT INSTRUCTIONS
Patient information on the evaluation procedure for sleep apnea:    1. You are going to have a portable sleep study: This is a home sleep study that will be done to see if you have obstructive sleep apnea. You will have a flow monitor on your nose, belt on your chest and a oxygen/heart rate monitor on your finger. Please do not wear nail polish on day of the study as it will interfere with the oxygen reading probe that is placed on your finger during the study. Once you receive the device, you will also receive instructions on how to put it on and turn it on. After 2 nights you will return it. 2. The sleep office will call you with the results a week after the study. If the study showed that you have obstructive sleep apnea you will be told of the next step in your care. Commonly the recommended treatment is CPAP Therapy. If you agree to this therapy and you receive your CPAP before your next appointment, please bring it with you to your first follow-up appointment. Patients who have obstructive sleep apnea on the sleep study and have chosen to try CPAP:  A home equipment company will contact you to set you up with a CPAP machine and fit you for a mask. Please bring your CPAP, the mask and all supplies including the electrical cord with you to all your first follow up appointments with the sleep physician    Please keep trying to use your CPAP until you have seen in the sleep office in follow up as a lot of the problems patients have with CPAP can be addressed and corrected by your sleep provider. Sleep center contact information:  [facility]: [phone]  [facility]: [phone]             What are the risk factors for Obstructive Sleep Apnea (FROY)? Obesity  Snoring  Daytime sleepiness  Increasing age  Male gender  Taking sedating medications  Alcohol use  Hypertension  Stroke  Diabetes  Smoking     What is FROY?   People with FROY experience recurrent

## 2023-10-20 NOTE — PROGRESS NOTES
[facility]  Sleep Medicine  [address], [address] [facility], [facility] [facility]    Chief Complaint   Patient presents with    Sleep Apnea       María Duke is a 41 y.o. female who comes in for sleep evaluation. She is here to be evaluated for previously diagnosed FROY. She was diagnosed in 2021. She used to use Bilevel but she stopped using it because of issues with her mask. She wants to start PAP therapy again. She was referred here by her psychiatrist.     Pertinent PMHx includes: FROY, depression, anxiety, hypothyroidism, severe obesity, history of opioids abuse. She goes to bed at around 10pm. She falls asleep in  a few minutes. She awakens several times per night. She awakens at early in the morning. She does not use sleep aid/s however takes clonazepam as needed for anxiety (she reports she is not taking it at night). She does take daytime naps. She describes the symptoms as daytime sleepiness, fatigue, loud snoring, disrupted sleep, difficulty staying asleep, restless sleep, non refreshed sleep , falling asleep/dosing off during idle situations, and morning dry mouth. She does not have symptoms that fulfill the criteria for restless legs syndrome. She has not dozed off while driving. She denies recent significant weight gain . Previous evaluation and treatment has included: Split night study and PAP therapy.     Caffeinated drinks/day: soda a few times a week  Alcohol intake/day/week: none  Occupation: [address]:  [place]win & Insomnia Severity Index scores      10/20/2023    10:10 AM 8/9/2021    10:23 AM 5/4/2021     2:58 PM   Sleep Medicine   Sitting and reading 3 2 3   Watching TV 3 3 3   Sitting, inactive in a public place (e.g. a theatre or a meeting) 1 0 0   As a passenger in a car for an hour without a break 3 2 0   Lying down to rest in the afternoon when circumstances permit 3 3 2   Sitting and talking to someone 2 1 0   Sitting quietly after a lunch

## 2023-10-25 ENCOUNTER — TELEPHONE (OUTPATIENT)
Dept: SLEEP CENTER | Age: 43
End: 2023-10-25

## 2023-10-29 DIAGNOSIS — E03.9 HYPOTHYROIDISM (ACQUIRED): Chronic | ICD-10-CM

## 2023-10-30 RX ORDER — LEVOTHYROXINE SODIUM 0.15 MG/1
150 TABLET ORAL DAILY
Qty: 90 TABLET | Refills: 1 | Status: SHIPPED | OUTPATIENT
Start: 2023-10-30 | End: 2023-11-01

## 2023-10-31 ENCOUNTER — OFFICE VISIT (OUTPATIENT)
Dept: PSYCHIATRY | Age: 43
End: 2023-10-31
Payer: COMMERCIAL

## 2023-10-31 VITALS
HEART RATE: 74 BPM | WEIGHT: 275 LBS | DIASTOLIC BLOOD PRESSURE: 76 MMHG | OXYGEN SATURATION: 96 % | HEIGHT: 68 IN | SYSTOLIC BLOOD PRESSURE: 122 MMHG | RESPIRATION RATE: 16 BRPM | BODY MASS INDEX: 41.68 KG/M2

## 2023-10-31 DIAGNOSIS — E03.9 HYPOTHYROIDISM (ACQUIRED): Chronic | ICD-10-CM

## 2023-10-31 DIAGNOSIS — F90.2 ADHD (ATTENTION DEFICIT HYPERACTIVITY DISORDER), COMBINED TYPE: ICD-10-CM

## 2023-10-31 DIAGNOSIS — F41.1 GENERALIZED ANXIETY DISORDER: ICD-10-CM

## 2023-10-31 DIAGNOSIS — F33.41 RECURRENT MAJOR DEPRESSIVE DISORDER, IN PARTIAL REMISSION (HCC): Primary | ICD-10-CM

## 2023-10-31 LAB
T4 FREE SERPL-MCNC: 1 NG/DL (ref 0.9–1.8)
TSH SERPL DL<=0.005 MIU/L-ACNC: 0.05 UIU/ML (ref 0.27–4.2)

## 2023-10-31 PROCEDURE — G8484 FLU IMMUNIZE NO ADMIN: HCPCS | Performed by: PSYCHIATRY & NEUROLOGY

## 2023-10-31 PROCEDURE — G8417 CALC BMI ABV UP PARAM F/U: HCPCS | Performed by: PSYCHIATRY & NEUROLOGY

## 2023-10-31 PROCEDURE — 99214 OFFICE O/P EST MOD 30 MIN: CPT | Performed by: PSYCHIATRY & NEUROLOGY

## 2023-10-31 PROCEDURE — 4004F PT TOBACCO SCREEN RCVD TLK: CPT | Performed by: PSYCHIATRY & NEUROLOGY

## 2023-10-31 PROCEDURE — G8427 DOCREV CUR MEDS BY ELIG CLIN: HCPCS | Performed by: PSYCHIATRY & NEUROLOGY

## 2023-10-31 RX ORDER — LISDEXAMFETAMINE DIMESYLATE CAPSULES 40 MG/1
40 CAPSULE ORAL DAILY
Qty: 30 CAPSULE | Refills: 0 | Status: SHIPPED | OUTPATIENT
Start: 2024-01-18 | End: 2024-02-17

## 2023-10-31 RX ORDER — CLONAZEPAM 0.5 MG/1
0.5 TABLET ORAL 2 TIMES DAILY PRN
Qty: 9 TABLET | Refills: 5 | Status: SHIPPED | OUTPATIENT
Start: 2023-11-05 | End: 2023-12-10

## 2023-10-31 RX ORDER — LISDEXAMFETAMINE DIMESYLATE CAPSULES 40 MG/1
40 CAPSULE ORAL DAILY
Qty: 30 CAPSULE | Refills: 0 | Status: SHIPPED | OUTPATIENT
Start: 2023-12-19 | End: 2024-01-18

## 2023-10-31 RX ORDER — LISDEXAMFETAMINE DIMESYLATE CAPSULES 40 MG/1
40 CAPSULE ORAL DAILY
Qty: 30 CAPSULE | Refills: 0 | Status: SHIPPED | OUTPATIENT
Start: 2023-11-19 | End: 2023-12-19

## 2023-10-31 ASSESSMENT — ANXIETY QUESTIONNAIRES
7. FEELING AFRAID AS IF SOMETHING AWFUL MIGHT HAPPEN: 0
4. TROUBLE RELAXING: 0
1. FEELING NERVOUS, ANXIOUS, OR ON EDGE: 1
2. NOT BEING ABLE TO STOP OR CONTROL WORRYING: 1
3. WORRYING TOO MUCH ABOUT DIFFERENT THINGS: 1
5. BEING SO RESTLESS THAT IT IS HARD TO SIT STILL: 0
6. BECOMING EASILY ANNOYED OR IRRITABLE: 2
GAD7 TOTAL SCORE: 5
IF YOU CHECKED OFF ANY PROBLEMS ON THIS QUESTIONNAIRE, HOW DIFFICULT HAVE THESE PROBLEMS MADE IT FOR YOU TO DO YOUR WORK, TAKE CARE OF THINGS AT HOME, OR GET ALONG WITH OTHER PEOPLE: SOMEWHAT DIFFICULT

## 2023-10-31 ASSESSMENT — PATIENT HEALTH QUESTIONNAIRE - PHQ9
SUM OF ALL RESPONSES TO PHQ QUESTIONS 1-9: 7
7. TROUBLE CONCENTRATING ON THINGS, SUCH AS READING THE NEWSPAPER OR WATCHING TELEVISION: 0
SUM OF ALL RESPONSES TO PHQ QUESTIONS 1-9: 7
SUM OF ALL RESPONSES TO PHQ QUESTIONS 1-9: 7
3. TROUBLE FALLING OR STAYING ASLEEP: 3
10. IF YOU CHECKED OFF ANY PROBLEMS, HOW DIFFICULT HAVE THESE PROBLEMS MADE IT FOR YOU TO DO YOUR WORK, TAKE CARE OF THINGS AT HOME, OR GET ALONG WITH OTHER PEOPLE: 1
8. MOVING OR SPEAKING SO SLOWLY THAT OTHER PEOPLE COULD HAVE NOTICED. OR THE OPPOSITE, BEING SO FIGETY OR RESTLESS THAT YOU HAVE BEEN MOVING AROUND A LOT MORE THAN USUAL: 0
5. POOR APPETITE OR OVEREATING: 0
SUM OF ALL RESPONSES TO PHQ9 QUESTIONS 1 & 2: 2
9. THOUGHTS THAT YOU WOULD BE BETTER OFF DEAD, OR OF HURTING YOURSELF: 0
SUM OF ALL RESPONSES TO PHQ QUESTIONS 1-9: 7
2. FEELING DOWN, DEPRESSED OR HOPELESS: 1
1. LITTLE INTEREST OR PLEASURE IN DOING THINGS: 1
4. FEELING TIRED OR HAVING LITTLE ENERGY: 1
6. FEELING BAD ABOUT YOURSELF - OR THAT YOU ARE A FAILURE OR HAVE LET YOURSELF OR YOUR FAMILY DOWN: 1

## 2023-10-31 NOTE — PROGRESS NOTES
6/30/2023     9:00 AM 6/27/2023     8:28 AM 4/27/2023    11:07 AM 4/4/2023     8:00 AM   BHUMI 7 SCORE   BHUMI-7 Total Score 5 7 4 4 8 10 19         Mental Status Exam:   Appearance    No acute distress, well dressed and groomed,   Motor: No abnormal movements, tics or mannerisms. Speech normal rate, rhythm and vol  Mood/Affect  ok/ full quality, good motility and range  Thought Process    mostly linear, at times tangential.   Thought Content  future oriented, no SI, intent or plan  Associations    logical connections  Attention/Concentration    intact  Memory    recent and remote memory intact  Insight/Judgement    Good / Intact    Labs:     Lab Results   Component Value Date    TSH 0.14 (L) 06/12/2023    TSHREFLEX 1.74 04/04/2023        ASSESSMENT:   42 yo F who struggles with poor frustration tolerance, depression, and anxiety. ADHD tx is helping overall. Main issue is getting her off benzodiazepines at this point which we are slowly reducing. She needs FROY treatment to optimize her cognition and energy. 1. Generalized anxiety disorder  2. Opioid use disorder, moderate, in remission, on maintenance therapy  3. MDD, recurrent, in partial remission  4. ADHD, combined type  5. FROY, untreated  6. PMDD    PLAN:   Reduce clonazepam to 0.5mg tabs BID prn, #9/week. 2.   Will increase vyvanse to 40mg daily  3. Pt is planning to f/u with sleep medicine about her FROY treatment. 4.   Pt is planning to obtain follow up TSH to check the status of her hypothyroidism.      I spent 30 min on this encounter including face to face time, chart review, documentation and orders     Medication Monitoring:    - OARRS reviewed, no issues noted        Follow-up: RTC in 5 weeks      Da Randall MD  Psychiatry

## 2023-11-01 RX ORDER — LEVOTHYROXINE SODIUM 0.12 MG/1
125 TABLET ORAL DAILY
Qty: 90 TABLET | Refills: 1 | Status: SHIPPED | OUTPATIENT
Start: 2023-11-01

## 2023-11-15 ENCOUNTER — HOSPITAL ENCOUNTER (OUTPATIENT)
Dept: SLEEP CENTER | Age: 43
Discharge: HOME | End: 2023-11-15
Payer: COMMERCIAL

## 2023-11-15 DIAGNOSIS — E66.01 MORBID OBESITY WITH BMI OF 40.0-44.9, ADULT (HCC): ICD-10-CM

## 2023-11-15 DIAGNOSIS — G47.33 OSA (OBSTRUCTIVE SLEEP APNEA): ICD-10-CM

## 2023-11-15 DIAGNOSIS — R06.83 SNORING: ICD-10-CM

## 2023-11-15 DIAGNOSIS — G47.10 HYPERSOMNIA: ICD-10-CM

## 2023-11-15 DIAGNOSIS — R06.81 WITNESSED EPISODE OF APNEA: ICD-10-CM

## 2023-11-15 PROCEDURE — 95810 POLYSOM 6/> YRS 4/> PARAM: CPT

## 2023-11-16 ENCOUNTER — TELEPHONE (OUTPATIENT)
Dept: PULMONOLOGY | Age: 43
End: 2023-11-16

## 2023-11-16 NOTE — TELEPHONE ENCOUNTER
Patient's study is consistent with mild FROY. I will contact her to discuss result and treatment plan.     Shantel Downs MD

## 2023-11-17 NOTE — TELEPHONE ENCOUNTER
I attempted to reach patient by phone but unsuccessfully. I will send them a Lyks message.     Delonte Boucher MD

## 2023-11-21 PROBLEM — G47.33 OSA (OBSTRUCTIVE SLEEP APNEA): Status: ACTIVE | Noted: 2023-11-21

## 2023-11-28 ENCOUNTER — TELEPHONE (OUTPATIENT)
Dept: INTERNAL MEDICINE CLINIC | Age: 43
End: 2023-11-28

## 2023-11-28 NOTE — TELEPHONE ENCOUNTER
Last seen 10/18/23 recommended for her to come back in 6 months. You adjusted her dosage of Levothyroxine based on her labs already. Are you willing to write Radha Tong for her or want to see her back sooner?

## 2023-11-28 NOTE — TELEPHONE ENCOUNTER
Any suggestions for an appt?     Trip Maldonado  P [facility] [id number] hours ago (6:03 PM)     MM  Appointment Request From: Trip Maldonado     With Provider: Griselda Fester, MD [address]     Preferred Date Range: 12/1/2023 - 12/18/2023     Preferred Times: Any Time     Reason for visit: Request an Appointment     Comments:  Medication - lowering dose of thyroid meds/switching to Ozempic

## 2023-12-05 ENCOUNTER — OFFICE VISIT (OUTPATIENT)
Dept: PSYCHIATRY | Age: 43
End: 2023-12-05
Payer: COMMERCIAL

## 2023-12-05 VITALS
DIASTOLIC BLOOD PRESSURE: 80 MMHG | SYSTOLIC BLOOD PRESSURE: 122 MMHG | OXYGEN SATURATION: 99 % | HEART RATE: 61 BPM | BODY MASS INDEX: 41.37 KG/M2 | TEMPERATURE: 97.7 F | WEIGHT: 272 LBS

## 2023-12-05 DIAGNOSIS — E53.8 VITAMIN B12 DEFICIENCY: ICD-10-CM

## 2023-12-05 DIAGNOSIS — E55.9 VITAMIN D DEFICIENCY: Primary | ICD-10-CM

## 2023-12-05 DIAGNOSIS — F41.1 GENERALIZED ANXIETY DISORDER: ICD-10-CM

## 2023-12-05 DIAGNOSIS — Z79.899 ON LONG TERM DRUG THERAPY: Primary | ICD-10-CM

## 2023-12-05 DIAGNOSIS — F11.21 OPIOID USE DISORDER, MODERATE, IN SUSTAINED REMISSION (HCC): ICD-10-CM

## 2023-12-05 DIAGNOSIS — F33.41 RECURRENT MAJOR DEPRESSIVE DISORDER, IN PARTIAL REMISSION (HCC): ICD-10-CM

## 2023-12-05 DIAGNOSIS — Z79.899 ON LONG TERM DRUG THERAPY: ICD-10-CM

## 2023-12-05 DIAGNOSIS — R53.83 OTHER FATIGUE: ICD-10-CM

## 2023-12-05 LAB
25(OH)D3 SERPL-MCNC: 19.7 NG/ML
FOLATE SERPL-MCNC: 9.54 NG/ML (ref 4.78–24.2)
VIT B12 SERPL-MCNC: 237 PG/ML (ref 211–911)

## 2023-12-05 PROCEDURE — G8417 CALC BMI ABV UP PARAM F/U: HCPCS | Performed by: PSYCHIATRY & NEUROLOGY

## 2023-12-05 PROCEDURE — 99214 OFFICE O/P EST MOD 30 MIN: CPT | Performed by: PSYCHIATRY & NEUROLOGY

## 2023-12-05 PROCEDURE — G8484 FLU IMMUNIZE NO ADMIN: HCPCS | Performed by: PSYCHIATRY & NEUROLOGY

## 2023-12-05 PROCEDURE — G8427 DOCREV CUR MEDS BY ELIG CLIN: HCPCS | Performed by: PSYCHIATRY & NEUROLOGY

## 2023-12-05 PROCEDURE — 4004F PT TOBACCO SCREEN RCVD TLK: CPT | Performed by: PSYCHIATRY & NEUROLOGY

## 2023-12-05 RX ORDER — CLONAZEPAM 0.5 MG/1
0.5 TABLET ORAL 2 TIMES DAILY PRN
Qty: 8 TABLET | Refills: 5 | Status: SHIPPED | OUTPATIENT
Start: 2023-12-10 | End: 2024-01-21

## 2023-12-05 ASSESSMENT — PATIENT HEALTH QUESTIONNAIRE - PHQ9
SUM OF ALL RESPONSES TO PHQ9 QUESTIONS 1 & 2: 2
3. TROUBLE FALLING OR STAYING ASLEEP: 2
SUM OF ALL RESPONSES TO PHQ QUESTIONS 1-9: 7
4. FEELING TIRED OR HAVING LITTLE ENERGY: 1
1. LITTLE INTEREST OR PLEASURE IN DOING THINGS: 1
SUM OF ALL RESPONSES TO PHQ QUESTIONS 1-9: 8
8. MOVING OR SPEAKING SO SLOWLY THAT OTHER PEOPLE COULD HAVE NOTICED. OR THE OPPOSITE, BEING SO FIGETY OR RESTLESS THAT YOU HAVE BEEN MOVING AROUND A LOT MORE THAN USUAL: 0
9. THOUGHTS THAT YOU WOULD BE BETTER OFF DEAD, OR OF HURTING YOURSELF: 1
10. IF YOU CHECKED OFF ANY PROBLEMS, HOW DIFFICULT HAVE THESE PROBLEMS MADE IT FOR YOU TO DO YOUR WORK, TAKE CARE OF THINGS AT HOME, OR GET ALONG WITH OTHER PEOPLE: 2
SUM OF ALL RESPONSES TO PHQ QUESTIONS 1-9: 8
SUM OF ALL RESPONSES TO PHQ QUESTIONS 1-9: 8
2. FEELING DOWN, DEPRESSED OR HOPELESS: 1
7. TROUBLE CONCENTRATING ON THINGS, SUCH AS READING THE NEWSPAPER OR WATCHING TELEVISION: 0
6. FEELING BAD ABOUT YOURSELF - OR THAT YOU ARE A FAILURE OR HAVE LET YOURSELF OR YOUR FAMILY DOWN: 1
5. POOR APPETITE OR OVEREATING: 1

## 2023-12-05 ASSESSMENT — ANXIETY QUESTIONNAIRES
2. NOT BEING ABLE TO STOP OR CONTROL WORRYING: 1
GAD7 TOTAL SCORE: 6
3. WORRYING TOO MUCH ABOUT DIFFERENT THINGS: 1
4. TROUBLE RELAXING: 0
5. BEING SO RESTLESS THAT IT IS HARD TO SIT STILL: 0
6. BECOMING EASILY ANNOYED OR IRRITABLE: 2
1. FEELING NERVOUS, ANXIOUS, OR ON EDGE: 1
IF YOU CHECKED OFF ANY PROBLEMS ON THIS QUESTIONNAIRE, HOW DIFFICULT HAVE THESE PROBLEMS MADE IT FOR YOU TO DO YOUR WORK, TAKE CARE OF THINGS AT HOME, OR GET ALONG WITH OTHER PEOPLE: VERY DIFFICULT
7. FEELING AFRAID AS IF SOMETHING AWFUL MIGHT HAPPEN: 1

## 2023-12-05 NOTE — PROGRESS NOTES
PSYCHIATRY PROGRESS NOTE        Anna Oliveros  1980  12/5/2023  PCP: Saleem Garcia MD      CC:   Chief Complaint   Patient presents with    Follow-up     S:   Pt completed sleep study which showed mild FROY. CPAP therapy was recommended but she opted to not go forward with it with the goal to continue to lose weight. Pt is overall feeling better with the higher dose of vyvanse, feels less irritable, better focused and less distracted by her environment, and better energy. She reports chronic struggles with low appetite and irregular eating patterns, but generally she tends to not eat much during the day. This issue existed prior to any changes of her thyroid and prior to starting of vyvanse. Some of her medications do cause some nausea which can exacerbate this. She worries about being nutrient deficient. Wasn't able to fill suboxone for the last couple days so is going through some mild withdrawal which is bothersome. Has a desire to taper off suboxone in the near future. Still open to reductions of the clonazepam as planned. ROS:  No cp, no palpitations    Brief Medical Hx:   Pseudotumor cerebri, Hypothyroidism, morbid obesity, hidrandenitis suppurativa    Brief Psych Hx:  Med trials: lexapro, celexa, paxil (could not tolerate any of these, nausea), duloxetine (did not tolerate). Sertraline - weight gain. Been on diazepam 5mg BID for over 10 yrs  NSSI: did cut self 4 times in late teens, but denies intent to end life    Current Psychiatric Medications:  Vyvanse 40mg daily  Fluoxetine 40mg daily  Clonazepam 0.5mg - 1mg prn (#9 0.5mg tabs/ week)    O:  There were no vitals filed for this visit.        Weight - Scale: 123.4 kg (272 lb)        10/31/2023     9:36 AM 10/18/2023     3:58 PM 9/12/2023     8:40 AM 8/10/2023     8:37 AM 6/30/2023     9:33 AM 6/27/2023     8:27 AM 6/12/2023     1:51 PM   PHQ Scores   PHQ2 Score 2 0 1 1 2 2 2   PHQ9 Score 7 3 8 3 6 10 8         10/31/2023     9:00 AM

## 2023-12-06 RX ORDER — ERGOCALCIFEROL 1.25 MG/1
50000 CAPSULE ORAL WEEKLY
Qty: 12 CAPSULE | Refills: 1 | Status: SHIPPED | OUTPATIENT
Start: 2023-12-06

## 2023-12-06 NOTE — PROGRESS NOTES
Patient noted to have borderline low normal B12 and low vitamin D 25-OH. Called patient and informed her. Will start vitamin B12 1000mcg daily, and vitamin D 58740n weekly. Pt was agreeable to the plan.

## 2023-12-09 LAB
6MAM UR QL: NOT DETECTED
7AMINOCLONAZEPAM UR QL: PRESENT
A-OH ALPRAZ UR QL: NOT DETECTED
ALPHA-OH-MIDAZOLAM, URINE: NOT DETECTED
ALPRAZ UR QL: NOT DETECTED
AMPHET UR QL SCN: PRESENT
ANNOTATION COMMENT IMP: NORMAL
ANNOTATION COMMENT IMP: NORMAL
BARBITURATES UR QL: NEGATIVE
BUPRENORPHINE UR QL: PRESENT
BZE UR QL: NEGATIVE
CARBOXYTHC UR QL: NORMAL
CARISOPRODOL UR QL: NEGATIVE
CLONAZEPAM UR QL: NOT DETECTED
CODEINE UR QL: NOT DETECTED
CREAT UR-MCNC: 104.3 MG/DL (ref 20–400)
DIAZEPAM UR QL: NOT DETECTED
ETHYL GLUCURONIDE UR QL: NEGATIVE
FENTANYL UR QL: NOT DETECTED
GABAPENTIN: NOT DETECTED
HYDROCODONE UR QL: NOT DETECTED
HYDROMORPHONE UR QL: NOT DETECTED
LORAZEPAM UR QL: NOT DETECTED
MDA UR QL: NOT DETECTED
MDEA UR QL: NOT DETECTED
MDMA UR QL: NOT DETECTED
MEPERIDINE UR QL: NOT DETECTED
METHADONE UR QL: NEGATIVE
METHAMPHET UR QL: NOT DETECTED
MIDAZOLAM UR QL SCN: NOT DETECTED
MORPHINE UR QL: NOT DETECTED
NALOXONE: NOT DETECTED
NORBUPRENORPHINE UR QL CFM: PRESENT
NORDIAZEPAM UR QL: NOT DETECTED
NORFENTANYL UR QL: NOT DETECTED
NORHYDROCODONE UR QL CFM: NOT DETECTED
NOROXYCODONE UR QL CFM: NOT DETECTED
NOROXYMORPHONE, URINE: NOT DETECTED
OXAZEPAM UR QL: NOT DETECTED
OXYCODONE UR QL: NOT DETECTED
OXYMORPHONE UR QL: NOT DETECTED
PATHOLOGY STUDY: NORMAL
PCP UR QL: NEGATIVE
PHENTERMINE UR QL: NOT DETECTED
PPAA UR QL: NOT DETECTED
PREGABALIN: NOT DETECTED
SERVICE CMNT-IMP: NORMAL
TAPENTADOL UR QL SCN: NOT DETECTED
TAPENTADOL-O-SULFATE, URINE: NOT DETECTED
TEMAZEPAM UR QL: NOT DETECTED
TRAMADOL UR QL: NEGATIVE
ZOLPIDEM UR QL: NOT DETECTED

## 2024-01-15 ENCOUNTER — OFFICE VISIT (OUTPATIENT)
Dept: INTERNAL MEDICINE CLINIC | Age: 44
End: 2024-01-15
Payer: COMMERCIAL

## 2024-01-15 VITALS
HEART RATE: 67 BPM | DIASTOLIC BLOOD PRESSURE: 82 MMHG | OXYGEN SATURATION: 99 % | WEIGHT: 271 LBS | TEMPERATURE: 97 F | BODY MASS INDEX: 41.22 KG/M2 | SYSTOLIC BLOOD PRESSURE: 134 MMHG

## 2024-01-15 DIAGNOSIS — F32.9 MAJOR DEPRESSIVE DISORDER WITH CURRENT ACTIVE EPISODE, UNSPECIFIED DEPRESSION EPISODE SEVERITY, UNSPECIFIED WHETHER RECURRENT: ICD-10-CM

## 2024-01-15 DIAGNOSIS — G93.2 PSEUDOTUMOR CEREBRI SYNDROME: ICD-10-CM

## 2024-01-15 DIAGNOSIS — F11.21 OPIOID USE DISORDER, MODERATE, IN SUSTAINED REMISSION (HCC): ICD-10-CM

## 2024-01-15 DIAGNOSIS — E88.810 METABOLIC SYNDROME: ICD-10-CM

## 2024-01-15 DIAGNOSIS — L73.2 HIDRADENITIS SUPPURATIVA: ICD-10-CM

## 2024-01-15 DIAGNOSIS — E66.01 CLASS 3 SEVERE OBESITY DUE TO EXCESS CALORIES WITH SERIOUS COMORBIDITY AND BODY MASS INDEX (BMI) OF 45.0 TO 49.9 IN ADULT (HCC): Primary | Chronic | ICD-10-CM

## 2024-01-15 DIAGNOSIS — B07.0 PLANTAR WART OF RIGHT FOOT: ICD-10-CM

## 2024-01-15 PROCEDURE — 4004F PT TOBACCO SCREEN RCVD TLK: CPT | Performed by: INTERNAL MEDICINE

## 2024-01-15 PROCEDURE — G8417 CALC BMI ABV UP PARAM F/U: HCPCS | Performed by: INTERNAL MEDICINE

## 2024-01-15 PROCEDURE — 99214 OFFICE O/P EST MOD 30 MIN: CPT | Performed by: INTERNAL MEDICINE

## 2024-01-15 PROCEDURE — G8427 DOCREV CUR MEDS BY ELIG CLIN: HCPCS | Performed by: INTERNAL MEDICINE

## 2024-01-15 PROCEDURE — G8484 FLU IMMUNIZE NO ADMIN: HCPCS | Performed by: INTERNAL MEDICINE

## 2024-01-15 RX ORDER — SEMAGLUTIDE 1.34 MG/ML
0.5 INJECTION, SOLUTION SUBCUTANEOUS WEEKLY
Qty: 4.5 ML | Refills: 1 | Status: SHIPPED | OUTPATIENT
Start: 2024-01-15

## 2024-01-15 RX ORDER — SULFAMETHOXAZOLE AND TRIMETHOPRIM 800; 160 MG/1; MG/1
1 TABLET ORAL 2 TIMES DAILY
Qty: 20 TABLET | Refills: 0 | Status: SHIPPED | OUTPATIENT
Start: 2024-01-15 | End: 2024-01-25

## 2024-01-15 ASSESSMENT — PATIENT HEALTH QUESTIONNAIRE - PHQ9
7. TROUBLE CONCENTRATING ON THINGS, SUCH AS READING THE NEWSPAPER OR WATCHING TELEVISION: 0
SUM OF ALL RESPONSES TO PHQ QUESTIONS 1-9: 0
8. MOVING OR SPEAKING SO SLOWLY THAT OTHER PEOPLE COULD HAVE NOTICED. OR THE OPPOSITE, BEING SO FIGETY OR RESTLESS THAT YOU HAVE BEEN MOVING AROUND A LOT MORE THAN USUAL: 0
5. POOR APPETITE OR OVEREATING: 0
4. FEELING TIRED OR HAVING LITTLE ENERGY: 0
9. THOUGHTS THAT YOU WOULD BE BETTER OFF DEAD, OR OF HURTING YOURSELF: 0
1. LITTLE INTEREST OR PLEASURE IN DOING THINGS: 0
SUM OF ALL RESPONSES TO PHQ QUESTIONS 1-9: 0
SUM OF ALL RESPONSES TO PHQ9 QUESTIONS 1 & 2: 0
SUM OF ALL RESPONSES TO PHQ QUESTIONS 1-9: 0
10. IF YOU CHECKED OFF ANY PROBLEMS, HOW DIFFICULT HAVE THESE PROBLEMS MADE IT FOR YOU TO DO YOUR WORK, TAKE CARE OF THINGS AT HOME, OR GET ALONG WITH OTHER PEOPLE: 0
SUM OF ALL RESPONSES TO PHQ QUESTIONS 1-9: 0
3. TROUBLE FALLING OR STAYING ASLEEP: 0
6. FEELING BAD ABOUT YOURSELF - OR THAT YOU ARE A FAILURE OR HAVE LET YOURSELF OR YOUR FAMILY DOWN: 0
2. FEELING DOWN, DEPRESSED OR HOPELESS: 0

## 2024-01-15 NOTE — PROGRESS NOTES
Chief Complaint   Patient presents with    Weight Management       Assessment/Plan:  Neena was seen today for weight management.    Diagnoses and all orders for this visit:    Class 3 severe obesity due to excess calories with serious comorbidity and body mass index (BMI) of 45.0 to 49.9 in adult ([facility])  -     Semaglutide,0.25 or 0.5MG/DOS, (OZEMPIC, 0.25 OR 0.5 MG/DOSE,) 2 MG/1.5ML SOPN; Inject 0.5 mg into the skin once a week    Opioid use disorder, moderate, in sustained remission ([facility])  Comments:  Chronic, stable. She is on Suboxone    Major depressive disorder with current active episode, unspecified depression episode severity, unspecified whether recurrent  Comments:  She is on Prozac and seeing Dr. Rosanna cam of right foot  -     Yaya Lord DPM, Podiatry, [facility]    Hidradenitis suppurativa  Comments:  She is on Clindamycin  Orders:  -     sulfamethoxazole-trimethoprim (BACTRIM DS;SEPTRA DS) 800-160 MG per tablet; Take 1 tablet by mouth 2 times daily for 10 days    Metabolic syndrome  -     Semaglutide,0.25 or 0.5MG/DOS, (OZEMPIC, 0.25 OR 0.5 MG/DOSE,) 2 MG/1.5ML SOPN; Inject 0.5 mg into the skin once a week    Pseudotumor cerebri syndrome  -     Semaglutide,0.25 or 0.5MG/DOS, (OZEMPIC, 0.25 OR 0.5 MG/DOSE,) 2 MG/1.5ML SOPN; Inject 0.5 mg into the skin once a week        Vitals:    01/15/24 1104   BP: 134/82   Pulse: 67   Temp: 97 °F (36.1 °C)   SpO2: 99%     Physical Exam  Vitals reviewed.   Constitutional:       General: She is not in acute distress.     Appearance: She is well-developed. She is obese. She is not diaphoretic.   HENT:      Head: Normocephalic and atraumatic.   Pulmonary:      Effort: Pulmonary effort is normal.   Neurological:      Mental Status: She is alert and oriented to person, place, and time.      Cranial Nerves: No cranial nerve deficit.   Psychiatric:         Behavior: Behavior normal.         Thought Content: Thought content normal.

## 2024-01-16 ENCOUNTER — OFFICE VISIT (OUTPATIENT)
Dept: PSYCHIATRY | Age: 44
End: 2024-01-16
Payer: COMMERCIAL

## 2024-01-16 VITALS
WEIGHT: 270 LBS | HEIGHT: 68 IN | SYSTOLIC BLOOD PRESSURE: 122 MMHG | BODY MASS INDEX: 40.92 KG/M2 | HEART RATE: 74 BPM | OXYGEN SATURATION: 98 % | DIASTOLIC BLOOD PRESSURE: 76 MMHG | RESPIRATION RATE: 16 BRPM

## 2024-01-16 DIAGNOSIS — F11.21 OPIOID USE DISORDER, MODERATE, IN SUSTAINED REMISSION (HCC): ICD-10-CM

## 2024-01-16 DIAGNOSIS — F41.1 GENERALIZED ANXIETY DISORDER: ICD-10-CM

## 2024-01-16 DIAGNOSIS — E53.8 VITAMIN B12 DEFICIENCY: ICD-10-CM

## 2024-01-16 DIAGNOSIS — F33.42 RECURRENT MAJOR DEPRESSIVE DISORDER, IN FULL REMISSION (HCC): ICD-10-CM

## 2024-01-16 DIAGNOSIS — E55.9 VITAMIN D DEFICIENCY: ICD-10-CM

## 2024-01-16 DIAGNOSIS — F90.2 ADHD (ATTENTION DEFICIT HYPERACTIVITY DISORDER), COMBINED TYPE: Primary | ICD-10-CM

## 2024-01-16 DIAGNOSIS — F32.81 PMDD (PREMENSTRUAL DYSPHORIC DISORDER): ICD-10-CM

## 2024-01-16 PROCEDURE — G8427 DOCREV CUR MEDS BY ELIG CLIN: HCPCS | Performed by: PSYCHIATRY & NEUROLOGY

## 2024-01-16 PROCEDURE — 99214 OFFICE O/P EST MOD 30 MIN: CPT | Performed by: PSYCHIATRY & NEUROLOGY

## 2024-01-16 PROCEDURE — 4004F PT TOBACCO SCREEN RCVD TLK: CPT | Performed by: PSYCHIATRY & NEUROLOGY

## 2024-01-16 PROCEDURE — G8484 FLU IMMUNIZE NO ADMIN: HCPCS | Performed by: PSYCHIATRY & NEUROLOGY

## 2024-01-16 PROCEDURE — G8417 CALC BMI ABV UP PARAM F/U: HCPCS | Performed by: PSYCHIATRY & NEUROLOGY

## 2024-01-16 RX ORDER — LISDEXAMFETAMINE DIMESYLATE CAPSULES 40 MG/1
40 CAPSULE ORAL DAILY
Qty: 30 CAPSULE | Refills: 0 | Status: SHIPPED | OUTPATIENT
Start: 2024-03-18 | End: 2024-04-17

## 2024-01-16 RX ORDER — LISDEXAMFETAMINE DIMESYLATE CAPSULES 40 MG/1
40 CAPSULE ORAL DAILY
Qty: 30 CAPSULE | Refills: 0 | Status: SHIPPED | OUTPATIENT
Start: 2024-02-17 | End: 2024-03-18

## 2024-01-16 RX ORDER — CLONAZEPAM 0.5 MG/1
0.5 TABLET ORAL DAILY PRN
Qty: 6 TABLET | Refills: 4 | Status: SHIPPED | OUTPATIENT
Start: 2024-01-16 | End: 2024-02-20

## 2024-01-16 RX ORDER — FLUOXETINE HYDROCHLORIDE 20 MG/1
CAPSULE ORAL
Qty: 60 CAPSULE | Refills: 5 | Status: SHIPPED | OUTPATIENT
Start: 2024-01-16

## 2024-01-16 ASSESSMENT — ANXIETY QUESTIONNAIRES
IF YOU CHECKED OFF ANY PROBLEMS ON THIS QUESTIONNAIRE, HOW DIFFICULT HAVE THESE PROBLEMS MADE IT FOR YOU TO DO YOUR WORK, TAKE CARE OF THINGS AT HOME, OR GET ALONG WITH OTHER PEOPLE: SOMEWHAT DIFFICULT
2. NOT BEING ABLE TO STOP OR CONTROL WORRYING: 1
6. BECOMING EASILY ANNOYED OR IRRITABLE: 2
7. FEELING AFRAID AS IF SOMETHING AWFUL MIGHT HAPPEN: 1
3. WORRYING TOO MUCH ABOUT DIFFERENT THINGS: 1
4. TROUBLE RELAXING: 0
GAD7 TOTAL SCORE: 5
1. FEELING NERVOUS, ANXIOUS, OR ON EDGE: 0
5. BEING SO RESTLESS THAT IT IS HARD TO SIT STILL: 0

## 2024-01-16 ASSESSMENT — PATIENT HEALTH QUESTIONNAIRE - PHQ9
7. TROUBLE CONCENTRATING ON THINGS, SUCH AS READING THE NEWSPAPER OR WATCHING TELEVISION: 0
SUM OF ALL RESPONSES TO PHQ QUESTIONS 1-9: 0
2. FEELING DOWN, DEPRESSED OR HOPELESS: 0
SUM OF ALL RESPONSES TO PHQ9 QUESTIONS 1 & 2: 0
1. LITTLE INTEREST OR PLEASURE IN DOING THINGS: 0
8. MOVING OR SPEAKING SO SLOWLY THAT OTHER PEOPLE COULD HAVE NOTICED. OR THE OPPOSITE, BEING SO FIGETY OR RESTLESS THAT YOU HAVE BEEN MOVING AROUND A LOT MORE THAN USUAL: 0
9. THOUGHTS THAT YOU WOULD BE BETTER OFF DEAD, OR OF HURTING YOURSELF: 0
5. POOR APPETITE OR OVEREATING: 0
SUM OF ALL RESPONSES TO PHQ QUESTIONS 1-9: 0
10. IF YOU CHECKED OFF ANY PROBLEMS, HOW DIFFICULT HAVE THESE PROBLEMS MADE IT FOR YOU TO DO YOUR WORK, TAKE CARE OF THINGS AT HOME, OR GET ALONG WITH OTHER PEOPLE: 0
SUM OF ALL RESPONSES TO PHQ QUESTIONS 1-9: 0
6. FEELING BAD ABOUT YOURSELF - OR THAT YOU ARE A FAILURE OR HAVE LET YOURSELF OR YOUR FAMILY DOWN: 0
4. FEELING TIRED OR HAVING LITTLE ENERGY: 0
3. TROUBLE FALLING OR STAYING ASLEEP: 0
SUM OF ALL RESPONSES TO PHQ QUESTIONS 1-9: 0

## 2024-01-16 NOTE — PROGRESS NOTES
1/16/2024     9:00 AM 12/5/2023     8:09 AM 10/31/2023     9:00 AM 9/12/2023     8:00 AM 8/10/2023     8:00 AM 6/30/2023     9:00 AM 6/27/2023     8:28 AM   BHUMI 7 SCORE   BHUMI-7 Total Score 5 6 5 7 4 4 8       Mental Status Exam:   Appearance    No acute distress, well dressed and groomed,   Motor: No abnormal movements, tics or mannerisms.  Speech normal rate, rhythm and vol, increased quantity  Mood/Affect  good / full quality, good motility and range  Thought Process    mostly linear, at times tangential.   Thought Content  future oriented, no SI, intent or plan  Associations    logical connections  Attention/Concentration    intact  Memory    recent and remote memory intact  Insight/Judgement    Good / Intact    Labs:     Lab Results   Component Value Date    TSH 0.05 (L) 10/31/2023    TSHREFLEX 1.74 04/04/2023        ASSESSMENT:   44 yo F who struggles with poor frustration tolerance, depression, and anxiety. She is doing really well at this point and is fairly stable with her symptoms. She needs to stop MJ use to continue stimulant use and this may be a challenge for her. The B12, and vitamin D seems to be helping as well.     1. Generalized anxiety disorder  2. Opioid use disorder, moderate, in remission, on maintenance therapy  3. MDD, recurrent, in remission  4. ADHD, combined type  5. FROY, mild, untreated  6. PMDD  7. Vitamin D deficiency  8. Vitamin B12 deficiency    PLAN:   Reduce clonazepam to 0.5mg tabs BID prn, #6/week.  2.   Continue vyvanse 40mg daily  3.   Continue to work on weight loss  4.   Continue B12 and vitamin D replacement  5.   Stop using MJ. We will check a repeat UDS in 2 months to ensure it is negative in order to continue vyvanse use. Pt expressed understanding  6.   I recommended she stay on fluoxetine as I think it has helped her symptoms be stabilized    I spent 30 min on this encounter including face to face time, chart review, documentation and orders     Medication

## 2024-01-17 ENCOUNTER — TELEPHONE (OUTPATIENT)
Dept: ADMINISTRATIVE | Age: 44
End: 2024-01-17

## 2024-01-17 NOTE — TELEPHONE ENCOUNTER
Submitted PA for Ozempic (0.25 or 0.5 MG/DOSE) 2MG/3ML pen-injectors  Via CMM Key: [id number] STATUS: PENDING.    Follow up done daily; if no decision with in three days we will refax.  If another three days goes by with no decision will call the insurance for status.

## 2024-01-18 NOTE — TELEPHONE ENCOUNTER
The medication was DENIED; DENIAL letter uploaded to MED[employer].    If you want an APPEAL; please note in this encounter what new information you would like to APPEAL with.  Once complete route back to PA POOL.    If this requires a response please respond to the pool ( [facility] MEDICATION PRE-AUTH).      Thank you please advise patient.

## 2024-03-19 ENCOUNTER — TELEPHONE (OUTPATIENT)
Dept: NEUROLOGY | Age: 44
End: 2024-03-19

## 2024-03-19 DIAGNOSIS — G93.2 PSEUDOTUMOR CEREBRI: ICD-10-CM

## 2024-03-19 NOTE — TELEPHONE ENCOUNTER
Pt called office today to schedule follow up appointment as she had to cancel the last appointment due to be ing called into work.    She needs a refill for the Diamox 250 mg    LOV  7/19/23  NOV 4/8/24

## 2024-03-20 RX ORDER — ACETAZOLAMIDE 250 MG/1
TABLET ORAL
Qty: 60 TABLET | Refills: 0 | Status: SHIPPED | OUTPATIENT
Start: 2024-03-20

## 2024-04-24 ENCOUNTER — OFFICE VISIT (OUTPATIENT)
Dept: INTERNAL MEDICINE CLINIC | Age: 44
End: 2024-04-24
Payer: COMMERCIAL

## 2024-04-24 VITALS
HEART RATE: 66 BPM | OXYGEN SATURATION: 99 % | HEIGHT: 68 IN | WEIGHT: 282.8 LBS | TEMPERATURE: 97.3 F | BODY MASS INDEX: 42.86 KG/M2 | DIASTOLIC BLOOD PRESSURE: 88 MMHG | SYSTOLIC BLOOD PRESSURE: 138 MMHG

## 2024-04-24 DIAGNOSIS — F17.200 TOBACCO USE DISORDER: Chronic | ICD-10-CM

## 2024-04-24 DIAGNOSIS — I10 ESSENTIAL HYPERTENSION: ICD-10-CM

## 2024-04-24 DIAGNOSIS — G93.2 PSEUDOTUMOR CEREBRI SYNDROME: ICD-10-CM

## 2024-04-24 DIAGNOSIS — E88.810 METABOLIC SYNDROME: ICD-10-CM

## 2024-04-24 DIAGNOSIS — E03.9 HYPOTHYROIDISM (ACQUIRED): Chronic | ICD-10-CM

## 2024-04-24 DIAGNOSIS — E66.01 CLASS 3 SEVERE OBESITY DUE TO EXCESS CALORIES WITH SERIOUS COMORBIDITY AND BODY MASS INDEX (BMI) OF 45.0 TO 49.9 IN ADULT (HCC): Primary | Chronic | ICD-10-CM

## 2024-04-24 LAB
T4 FREE SERPL-MCNC: 1.5 NG/DL (ref 0.9–1.8)
TSH SERPL DL<=0.005 MIU/L-ACNC: 0.03 UIU/ML (ref 0.27–4.2)

## 2024-04-24 PROCEDURE — 3079F DIAST BP 80-89 MM HG: CPT | Performed by: INTERNAL MEDICINE

## 2024-04-24 PROCEDURE — G8427 DOCREV CUR MEDS BY ELIG CLIN: HCPCS | Performed by: INTERNAL MEDICINE

## 2024-04-24 PROCEDURE — 99214 OFFICE O/P EST MOD 30 MIN: CPT | Performed by: INTERNAL MEDICINE

## 2024-04-24 PROCEDURE — 3075F SYST BP GE 130 - 139MM HG: CPT | Performed by: INTERNAL MEDICINE

## 2024-04-24 PROCEDURE — G8417 CALC BMI ABV UP PARAM F/U: HCPCS | Performed by: INTERNAL MEDICINE

## 2024-04-24 PROCEDURE — 4004F PT TOBACCO SCREEN RCVD TLK: CPT | Performed by: INTERNAL MEDICINE

## 2024-04-24 RX ORDER — QUETIAPINE FUMARATE 25 MG/1
25 TABLET, FILM COATED ORAL DAILY
COMMUNITY
Start: 2024-04-22

## 2024-04-24 RX ORDER — CLONIDINE HYDROCHLORIDE 0.1 MG/1
0.1 TABLET ORAL 2 TIMES DAILY
COMMUNITY
Start: 2024-04-13 | End: 2024-04-24

## 2024-04-24 RX ORDER — SEMAGLUTIDE 1.34 MG/ML
0.5 INJECTION, SOLUTION SUBCUTANEOUS WEEKLY
Qty: 4.5 ML | Refills: 1 | Status: SHIPPED | OUTPATIENT
Start: 2024-04-24

## 2024-04-24 RX ORDER — DIAZEPAM 2 MG/1
2 TABLET ORAL 2 TIMES DAILY
COMMUNITY
Start: 2024-04-22

## 2024-04-24 RX ORDER — VALSARTAN AND HYDROCHLOROTHIAZIDE 160; 12.5 MG/1; MG/1
1 TABLET, FILM COATED ORAL DAILY
Qty: 90 TABLET | Refills: 1 | Status: SHIPPED | OUTPATIENT
Start: 2024-04-24

## 2024-04-24 NOTE — PROGRESS NOTES
Chief Complaint   Patient presents with    Follow-up     Obesity - has gained 10 lbs since starting seroquel  Discuss BP - high every time she sees her new psychologist        Assessment/Plan:  Neena was seen today for follow-up.    Diagnoses and all orders for this visit:    Class 3 severe obesity due to excess calories with serious comorbidity and body mass index (BMI) of 45.0 to 49.9 in adult ([facility])  -     Semaglutide,0.25 or 0.5MG/DOS, (OZEMPIC, 0.25 OR 0.5 MG/DOSE,) 2 MG/1.5ML SOPN; Inject 0.5 mg into the skin once a week    Tobacco use disorder  Comments:  Patient vapes.    Hypothyroidism (acquired)  -     T4, Free  -     TSH    Essential hypertension  -     valsartan-hydroCHLOROthiazide (DIOVAN-HCT) 160-12.5 MG per tablet; Take 1 tablet by mouth daily    Metabolic syndrome  -     Semaglutide,0.25 or 0.5MG/DOS, (OZEMPIC, 0.25 OR 0.5 MG/DOSE,) 2 MG/1.5ML SOPN; Inject 0.5 mg into the skin once a week    Pseudotumor cerebri syndrome  -     Semaglutide,0.25 or 0.5MG/DOS, (OZEMPIC, 0.25 OR 0.5 MG/DOSE,) 2 MG/1.5ML SOPN; Inject 0.5 mg into the skin once a week        Vitals:    04/24/24 1052   BP: 138/88   Pulse: 66   Temp: 97.3 °F (36.3 °C)   SpO2: 99%       Physical Exam  Vitals reviewed.   Constitutional:       General: She is not in acute distress.     Appearance: She is well-developed. She is obese. She is not diaphoretic.   HENT:      Head: Normocephalic and atraumatic.   Pulmonary:      Effort: Pulmonary effort is normal.   Neurological:      Mental Status: She is alert and oriented to person, place, and time.      Cranial Nerves: No cranial nerve deficit.   Psychiatric:         Behavior: Behavior normal.         Thought Content: Thought content normal.         Judgment: Judgment normal.             1/16/2024     9:35 AM 1/15/2024    11:07 AM 12/5/2023     8:07 AM 10/31/2023     9:36 AM 10/18/2023     3:58 PM 9/12/2023     8:40 AM 8/10/2023     8:37 AM   PHQ Scores   PHQ2 Score 0 0 2 2 0 1 1   PHQ9

## 2024-04-25 DIAGNOSIS — E03.9 HYPOTHYROIDISM (ACQUIRED): Chronic | ICD-10-CM

## 2024-04-25 RX ORDER — LEVOTHYROXINE SODIUM 88 UG/1
88 TABLET ORAL DAILY
Qty: 90 TABLET | Refills: 1 | Status: SHIPPED | OUTPATIENT
Start: 2024-04-25

## 2024-04-29 ENCOUNTER — TELEPHONE (OUTPATIENT)
Dept: ADMINISTRATIVE | Age: 44
End: 2024-04-29

## 2024-05-02 DIAGNOSIS — E03.9 HYPOTHYROIDISM (ACQUIRED): Chronic | ICD-10-CM

## 2024-05-02 RX ORDER — LEVOTHYROXINE SODIUM 0.15 MG/1
150 TABLET ORAL DAILY
Qty: 90 TABLET | Refills: 1 | OUTPATIENT
Start: 2024-05-02

## 2024-05-02 NOTE — TELEPHONE ENCOUNTER
Recent Visits  Date Type Provider Dept   04/24/24 Office Visit Daryn Malik MD [facility]   01/15/24 Office Visit Daryn Malik MD [facility]   10/18/23 Office Visit Daryn Malik MD [facility]   06/12/23 Office Visit Daryn Malik MD [facility]   04/24/23 Office Visit Daryn Malik MD [facility]   Showing recent visits within past 540 days with a meds authorizing provider and meeting all other requirements  Future Appointments  Date Type Provider Dept   08/26/24 Appointment Daryn Malik MD [facility]   Showing future appointments within next 150 days with a meds authorizing provider and meeting all other requirements     4/24/2024

## 2024-05-21 ENCOUNTER — OFFICE VISIT (OUTPATIENT)
Dept: NEUROLOGY | Age: 44
End: 2024-05-21
Payer: COMMERCIAL

## 2024-05-21 VITALS
SYSTOLIC BLOOD PRESSURE: 130 MMHG | HEART RATE: 73 BPM | HEIGHT: 68 IN | DIASTOLIC BLOOD PRESSURE: 86 MMHG | WEIGHT: 282 LBS | BODY MASS INDEX: 42.74 KG/M2

## 2024-05-21 DIAGNOSIS — E66.01 CLASS 3 SEVERE OBESITY DUE TO EXCESS CALORIES WITH SERIOUS COMORBIDITY AND BODY MASS INDEX (BMI) OF 45.0 TO 49.9 IN ADULT (HCC): Chronic | ICD-10-CM

## 2024-05-21 DIAGNOSIS — G93.2 PSEUDOTUMOR CEREBRI: Primary | ICD-10-CM

## 2024-05-21 PROCEDURE — G8417 CALC BMI ABV UP PARAM F/U: HCPCS | Performed by: PSYCHIATRY & NEUROLOGY

## 2024-05-21 PROCEDURE — 99213 OFFICE O/P EST LOW 20 MIN: CPT | Performed by: PSYCHIATRY & NEUROLOGY

## 2024-05-21 PROCEDURE — 4004F PT TOBACCO SCREEN RCVD TLK: CPT | Performed by: PSYCHIATRY & NEUROLOGY

## 2024-05-21 PROCEDURE — G8427 DOCREV CUR MEDS BY ELIG CLIN: HCPCS | Performed by: PSYCHIATRY & NEUROLOGY

## 2024-05-21 RX ORDER — ACETAZOLAMIDE 250 MG/1
TABLET ORAL
Qty: 60 TABLET | Refills: 2 | Status: SHIPPED | OUTPATIENT
Start: 2024-05-21

## 2024-05-21 NOTE — PROGRESS NOTES
The patient came today for follow up regarding: History of pseudotumor cerebri        This is my first encounter with the patient.  The patient was seen by Dr. Boateng.  I was able to review the patient's record, imaging, notes from different physicians and recent events.    Interval history:    The patient has been off her Diamox for the last month.  No visual changes.  No headache, weakness or numbness or tingling.  She is trying to lose weight.  No recent eye exam.  She is on metformin.  She is getting approval for Ozempic.  No sleep disturbance or insomnia.  Get better with her CPAP treatment with weight loss.  Other review of system was unremarkable.      Background history:  Patient has known pseudotumor cerebri.   Patient stated that she is doing better.  Patient was seen by the ophthalmologist several months ago and the lower repeat eye exam showed that the papilledema has come down nicely and it is only minimal now.  Her headaches have been better.  She has been taking her medications regularly.  Detailed history:  Patient has had visual symptoms and headaches.  Patient had a lumbar puncture and was found to have significant elevated opening pressure at 39 cm of CSF.  After the lumbar puncture, patient had significant post spinal headaches but now is slowly recovering.  Patient had a blood patch as well.  Patient had briefly been on minocycline.  No other focal neurological symptoms.  Patient had an MRI brain as well as MR venogram          Exam:   Constitutional:   Vitals:    05/21/24 1357 05/21/24 1402   BP: (!) 162/104 130/86   Pulse: 73    Weight: 127.9 kg (282 lb)    Height: 1.727 m (5' 7.99\")        General appearance:  Normal development and appear in no acute distress.   Mental Status:   Oriented to person, place, problem, and time.    Memory: Good immediate recall.  Intact remote memory  Normal attention span and concentration.  Language: intact naming, repeating and fluency   Good fund of Knowledge.

## 2024-06-21 DIAGNOSIS — E66.01 CLASS 3 SEVERE OBESITY DUE TO EXCESS CALORIES WITH SERIOUS COMORBIDITY AND BODY MASS INDEX (BMI) OF 45.0 TO 49.9 IN ADULT (HCC): Chronic | ICD-10-CM

## 2024-06-21 DIAGNOSIS — G93.2 PSEUDOTUMOR CEREBRI: ICD-10-CM

## 2024-06-23 NOTE — TELEPHONE ENCOUNTER
Recent Visits  Date Type Provider Dept   04/24/24 Office Visit Daryn Malik MD [facility]   01/15/24 Office Visit Daryn Malik MD [facility]   10/18/23 Office Visit Daryn Malik MD [facility]   06/12/23 Office Visit Daryn Malik MD [facility]   04/24/23 Office Visit Daryn Malik MD [facility]   Showing recent visits within past 540 days with a meds authorizing provider and meeting all other requirements  Future Appointments  Date Type Provider Dept   08/26/24 Appointment Daryn Malik MD [facility]   Showing future appointments within next 150 days with a meds authorizing provider and meeting all other requirements     4/24/2024

## 2024-07-02 NOTE — PROGRESS NOTES
DATE OF PROCEDURE: 07/02/2024  SERVICE: General Surgery.   Surgeons and Role:     * Germain Cueva Jr., MD - Primary     * Jose Camargo MD - Resident - Assisting  PREOPERATIVE DIAGNOSIS: Bilateral inguinal hernia.   POSTOPERATIVE DIAGNOSIS: Bilateral inguinal hernias.   PROCEDURE: Laparoscopic repair of bilateral inguinal hernias with mesh.  ANESTHESIA: General endotracheal and local.   DESCRIPTION OF PROCEDURE: The patient was taken to the Operating Room, placed   under general anesthesia, and prepped and draped in sterile fashion. At this   time, an infraumbilical incision was made after infiltrating with local   anesthetic. This was carried down to the linea alba with electrocautery and   blunt dissection. An incision was made in the anterior fascia, just to the right  of the linea alba. At this time, the rectus muscle was then swept laterally   and elevated, and a dissecting balloon trocar was placed in the retrorectus   space. At this time, under direct visualization, the balloon was insufflated,   creating the retrorectus space, a small defect was noticed and closed with clips. The trocar was then inserted   into this retrorectus space, and the balloon was insufflated and the dissecting   balloon was removed. Once this was complete, further ports were placed in the   midline, 5 mm in nature, one 1 fingerbreadth above the pubic symphysis and one   between the suprapubic port and the infraumbilical port. These were placed   under direct visualization, after infiltrating with local anesthetic. Once the   ports were placed, we turned our attention to the left side.  We swept the peritoneum   down laterally, beginning at the anterior superior iliac spine and continuing this dissection medially   towards the cord. The cord was elevated. There was noted to be a small sized   indirect hernia present. The sac was  from the cord structures and   reduced back to the level of the umbilicus. The  back structure I her day and provided a sense of fulfillment. 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 For the last week she has felt better with her mood, less depression, and less anxiety. However she continues to have generalized worries about her future, her son (for ex will think catastropic things may happen to him that she realizes later are unreasonable and excessive but has a hard time controlling it). Her sleep is broken and she can have poor energy during the day. She can still be prone to anger and irritability and feels reliant on the diazepam.    Main stressor is the job change and finances. ROS: no headaches, no vision problems, dysuria, abd pain, no cp, no palpitations    Brief Medical Hx:   Pseudotumor cerebri, Hypothyroidism, morbid obesity, hidrandenitis suppurativa    Brief Psych Hx:  Med trials: lexapro, celexa, paxil (could not tolerate any of these, nausea), duloxetine (did not tolerate). Did take sertraline, not sure how long or what dose, but she did not have side effects. Been on diazepam 5mg BID for over 10 yrs  NSSI: did cut self 4 times in late teens, but denies intent to end life    Current Outpatient Medications   Medication Sig Dispense Refill    busPIRone (BUSPAR) 5 MG tablet Take 1 tablet by mouth 2 times daily for 15 days, THEN 2 tablets 2 times daily for 15 days. 90 tablet 1    [START ON 4/21/2020] diazePAM (VALIUM) 5 MG tablet Take 1 tablet by mouth 2 times daily as needed for Anxiety for up to 60 days.  Do not fill before 4/21/2020 50 tablet 1    acetaZOLAMIDE (DIAMOX) 250 MG tablet TAKE 1 TABLET BY MOUTH TWICE DAILY 60 tablet 0    buPROPion (WELLBUTRIN XL) 150 MG extended release tablet Take 1 tablet by mouth every morning 30 tablet 3    albuterol sulfate HFA (PROVENTIL HFA) 108 (90 Base) MCG/ACT inhaler Inhale 2 puffs into the lungs every 6 hours as needed for Wheezing 1 Inhaler 3    levothyroxine peritoneum was extremely thin and a small hole was closed with clips.  The cord itself was skeletonized   and inspected, no signs of any other abnormalities.  At this time, we continued dissection   medially towards the direct space. There were no signs of an direct hernia   present, and we cleared Abdon's ligament medially as well. Once we had completed   dissection on the left side, we turned our attention to the right side.  We again swept the peritoneum down laterally, beginning at the anterior superior iliac spine and continuing this dissection medially towards the cord. The cord was elevated. There was noted to be a small sized indirect hernia present. The sac was  from the cord structures and   reduced back to the level of the umbilicus. The cord itself was skeletonized   and inspected, no signs of any other abnormalities.  At this time, we continued dissection   medially towards the direct space. There were again no signs of an direct hernia   present, and we cleared Abdon's ligament medially as well. we   proceeded with placement of mesh bilaterally. A piece of large 3DMAX mesh was   placed into the retrorectus space on both sides. Medially, it was brought   to midline and anteriorly to the first port. They were then tacked with two tacks   to Abdon's ligament medially, one to the rectus muscle anteriorly and then two   tacks laterally above the ASIS. At this time, the mesh was inspected, and it   was in very good position covering all defects. The sac had been dissected back   to the umbilicus and was well out of the way of the mesh repair. At this time   under direct visualization, the air was evacuated from the retrorectus space.   The ports were then removed. The retrorectus space was then infiltrated with   further 20 mL of local anesthetic and at this time, the infraumbilical port was   removed. The anterior fascia was closed with 0 Vicryl suture in figure-of-eight   fashion, closing the  fascia of this incision; and the port sites were closed   with 4-0 Monocryl in subcuticular fashion. Mastisol and Steri-Strips were then   placed. The patient was allowed to awake from general anesthesia and   transferred to bed for transport to Recovery.   COMPLICATIONS: None.   SPONGE COUNT: Correct.   BLOOD LOSS: 15 mL.   FLUIDS: Per Anesthesia.   BLOOD GIVEN: None.   DRAINS: None.   SPECIMENS: None.   CONDITION OF PATIENT: Good.   I was present for the entire procedure.     intact , future oriented, no suicidal ideation  Associations    logical connections  Attention/Concentration    intact  Memory    recent and remote memory intact  Insight/Judgement    Good / Intact    Labs:     Orders Only on 01/10/2020   Component Date Value Ref Range Status    Preg Test, Ur 01/10/2020    Final    inconclusive    hCG Qual 01/10/2020 Negative  Detects HCG level >10 MIU/mL Final       EK17: Rate 77 bpm, Normal sinus rhythm. Minimal voltage criteria for LVH, may be normal variant. QTc 443ms.        Poornima Sage MD  Psychiatry

## 2024-07-03 ENCOUNTER — PATIENT MESSAGE (OUTPATIENT)
Dept: INTERNAL MEDICINE CLINIC | Age: 44
End: 2024-07-03

## 2024-08-18 DIAGNOSIS — E53.8 VITAMIN B12 DEFICIENCY: ICD-10-CM

## 2024-08-19 RX ORDER — LANOLIN ALCOHOL/MO/W.PET/CERES
1000 CREAM (GRAM) TOPICAL DAILY
Qty: 90 TABLET | Refills: 1 | Status: SHIPPED | OUTPATIENT
Start: 2024-08-19

## 2024-08-19 NOTE — TELEPHONE ENCOUNTER
Recent Visits  Date Type Provider Dept   04/24/24 Office Visit Daryn Malik, MD [facility]   01/16/24 Office Visit Nikolay Marte, MD [facility]   01/15/24 Office Visit Daryn Malik, MD [facility]   12/05/23 Office Visit Nikolay Marte MD [facility]   10/31/23 Office Visit Nikolay Marte, MD [facility]   10/18/23 Office Visit Daryn Malik, MD [facility]   09/12/23 Office Visit Nikolay Marte MD [facility]   08/10/23 Office Visit Nikolay Marte MD [facility]   06/12/23 Office Visit Daryn Malik, MD [facility]   05/23/23 [facility]   Showing recent visits within past 540 days with a meds authorizing provider and meeting all other requirements  Future Appointments  Date Type Provider Dept   08/26/24 Appointment Daryn Malik MD [facility]   Showing future appointments within next 150 days with a meds authorizing provider and meeting all other requirements     4/24/2024

## 2024-09-17 ENCOUNTER — HOSPITAL ENCOUNTER (OUTPATIENT)
Dept: MAMMOGRAPHY | Age: 44
Discharge: HOME | End: 2024-09-21
Payer: COMMERCIAL

## 2024-09-17 VITALS — HEIGHT: 69 IN | WEIGHT: 290 LBS | BODY MASS INDEX: 42.95 KG/M2

## 2024-09-17 DIAGNOSIS — Z12.31 VISIT FOR SCREENING MAMMOGRAM: ICD-10-CM

## 2024-09-17 PROCEDURE — 77063 BREAST TOMOSYNTHESIS BI: CPT

## 2024-09-20 ENCOUNTER — TELEPHONE (OUTPATIENT)
Dept: WOMENS IMAGING | Age: 44
End: 2024-09-20

## 2024-09-20 DIAGNOSIS — N63.21 MASS OF UPPER OUTER QUADRANT OF LEFT BREAST: Primary | ICD-10-CM

## 2024-10-08 ENCOUNTER — HOSPITAL ENCOUNTER (OUTPATIENT)
Dept: WOMENS IMAGING | Age: 44
Discharge: HOME | End: 2024-10-08
Payer: COMMERCIAL

## 2024-10-08 ENCOUNTER — HOSPITAL ENCOUNTER (OUTPATIENT)
Dept: ULTRASOUND IMAGING | Age: 44
Discharge: HOME | End: 2024-10-08
Payer: COMMERCIAL

## 2024-10-08 DIAGNOSIS — R92.8 ABNORMAL MAMMOGRAM: ICD-10-CM

## 2024-10-08 DIAGNOSIS — N63.21 MASS OF UPPER OUTER QUADRANT OF LEFT BREAST: ICD-10-CM

## 2024-10-08 PROCEDURE — 76642 ULTRASOUND BREAST LIMITED: CPT

## 2024-10-08 PROCEDURE — G0279 TOMOSYNTHESIS, MAMMO: HCPCS

## 2024-11-18 ENCOUNTER — OFFICE VISIT (OUTPATIENT)
Dept: INTERNAL MEDICINE CLINIC | Age: 44
End: 2024-11-18
Payer: COMMERCIAL

## 2024-11-18 VITALS
WEIGHT: 282 LBS | TEMPERATURE: 97.1 F | SYSTOLIC BLOOD PRESSURE: 114 MMHG | DIASTOLIC BLOOD PRESSURE: 70 MMHG | HEART RATE: 91 BPM | OXYGEN SATURATION: 99 % | BODY MASS INDEX: 41.64 KG/M2

## 2024-11-18 DIAGNOSIS — E66.01 CLASS 3 SEVERE OBESITY DUE TO EXCESS CALORIES WITH SERIOUS COMORBIDITY AND BODY MASS INDEX (BMI) OF 45.0 TO 49.9 IN ADULT: Chronic | ICD-10-CM

## 2024-11-18 DIAGNOSIS — E55.9 VITAMIN D DEFICIENCY: ICD-10-CM

## 2024-11-18 DIAGNOSIS — E66.813 CLASS 3 SEVERE OBESITY DUE TO EXCESS CALORIES WITH SERIOUS COMORBIDITY AND BODY MASS INDEX (BMI) OF 45.0 TO 49.9 IN ADULT: Chronic | ICD-10-CM

## 2024-11-18 DIAGNOSIS — G93.2 PSEUDOTUMOR CEREBRI: ICD-10-CM

## 2024-11-18 DIAGNOSIS — E03.9 HYPOTHYROIDISM (ACQUIRED): Primary | ICD-10-CM

## 2024-11-18 DIAGNOSIS — Z12.4 CERVICAL CANCER SCREENING: ICD-10-CM

## 2024-11-18 PROCEDURE — G8484 FLU IMMUNIZE NO ADMIN: HCPCS | Performed by: INTERNAL MEDICINE

## 2024-11-18 PROCEDURE — G8417 CALC BMI ABV UP PARAM F/U: HCPCS | Performed by: INTERNAL MEDICINE

## 2024-11-18 PROCEDURE — G8427 DOCREV CUR MEDS BY ELIG CLIN: HCPCS | Performed by: INTERNAL MEDICINE

## 2024-11-18 PROCEDURE — 99214 OFFICE O/P EST MOD 30 MIN: CPT | Performed by: INTERNAL MEDICINE

## 2024-11-18 PROCEDURE — 4004F PT TOBACCO SCREEN RCVD TLK: CPT | Performed by: INTERNAL MEDICINE

## 2024-11-18 RX ORDER — ERGOCALCIFEROL 1.25 MG/1
50000 CAPSULE, LIQUID FILLED ORAL WEEKLY
Qty: 12 CAPSULE | Refills: 1 | Status: SHIPPED | OUTPATIENT
Start: 2024-11-18

## 2024-11-18 SDOH — ECONOMIC STABILITY: FOOD INSECURITY: WITHIN THE PAST 12 MONTHS, YOU WORRIED THAT YOUR FOOD WOULD RUN OUT BEFORE YOU GOT MONEY TO BUY MORE.: SOMETIMES TRUE

## 2024-11-18 SDOH — ECONOMIC STABILITY: FOOD INSECURITY: WITHIN THE PAST 12 MONTHS, THE FOOD YOU BOUGHT JUST DIDN'T LAST AND YOU DIDN'T HAVE MONEY TO GET MORE.: SOMETIMES TRUE

## 2024-11-18 SDOH — ECONOMIC STABILITY: INCOME INSECURITY: HOW HARD IS IT FOR YOU TO PAY FOR THE VERY BASICS LIKE FOOD, HOUSING, MEDICAL CARE, AND HEATING?: HARD

## 2024-11-18 ASSESSMENT — PATIENT HEALTH QUESTIONNAIRE - PHQ9
SUM OF ALL RESPONSES TO PHQ9 QUESTIONS 1 & 2: 2
5. POOR APPETITE OR OVEREATING: MORE THAN HALF THE DAYS
9. THOUGHTS THAT YOU WOULD BE BETTER OFF DEAD, OR OF HURTING YOURSELF: SEVERAL DAYS
10. IF YOU CHECKED OFF ANY PROBLEMS, HOW DIFFICULT HAVE THESE PROBLEMS MADE IT FOR YOU TO DO YOUR WORK, TAKE CARE OF THINGS AT HOME, OR GET ALONG WITH OTHER PEOPLE: SOMEWHAT DIFFICULT
SUM OF ALL RESPONSES TO PHQ QUESTIONS 1-9: 10
3. TROUBLE FALLING OR STAYING ASLEEP: SEVERAL DAYS
8. MOVING OR SPEAKING SO SLOWLY THAT OTHER PEOPLE COULD HAVE NOTICED. OR THE OPPOSITE, BEING SO FIGETY OR RESTLESS THAT YOU HAVE BEEN MOVING AROUND A LOT MORE THAN USUAL: SEVERAL DAYS
1. LITTLE INTEREST OR PLEASURE IN DOING THINGS: SEVERAL DAYS
7. TROUBLE CONCENTRATING ON THINGS, SUCH AS READING THE NEWSPAPER OR WATCHING TELEVISION: SEVERAL DAYS
6. FEELING BAD ABOUT YOURSELF - OR THAT YOU ARE A FAILURE OR HAVE LET YOURSELF OR YOUR FAMILY DOWN: SEVERAL DAYS
2. FEELING DOWN, DEPRESSED OR HOPELESS: SEVERAL DAYS
SUM OF ALL RESPONSES TO PHQ QUESTIONS 1-9: 10
SUM OF ALL RESPONSES TO PHQ QUESTIONS 1-9: 9
SUM OF ALL RESPONSES TO PHQ QUESTIONS 1-9: 10
4. FEELING TIRED OR HAVING LITTLE ENERGY: SEVERAL DAYS

## 2024-11-18 ASSESSMENT — ANXIETY QUESTIONNAIRES
2. NOT BEING ABLE TO STOP OR CONTROL WORRYING: SEVERAL DAYS
GAD7 TOTAL SCORE: 7
IF YOU CHECKED OFF ANY PROBLEMS ON THIS QUESTIONNAIRE, HOW DIFFICULT HAVE THESE PROBLEMS MADE IT FOR YOU TO DO YOUR WORK, TAKE CARE OF THINGS AT HOME, OR GET ALONG WITH OTHER PEOPLE: SOMEWHAT DIFFICULT
6. BECOMING EASILY ANNOYED OR IRRITABLE: SEVERAL DAYS
5. BEING SO RESTLESS THAT IT IS HARD TO SIT STILL: SEVERAL DAYS
4. TROUBLE RELAXING: SEVERAL DAYS
1. FEELING NERVOUS, ANXIOUS, OR ON EDGE: SEVERAL DAYS
3. WORRYING TOO MUCH ABOUT DIFFERENT THINGS: SEVERAL DAYS
7. FEELING AFRAID AS IF SOMETHING AWFUL MIGHT HAPPEN: SEVERAL DAYS

## 2024-11-18 NOTE — PROGRESS NOTES
Chief Complaint   Patient presents with    Follow-up    Hypertension    Hypothyroidism     Assessment/Plan:  Neena was seen today for follow-up, hypertension and hypothyroidism.    Diagnoses and all orders for this visit:    Hypothyroidism (acquired)  -     T4, Free  -     TSH    Pseudotumor cerebri  Comments:  She is taking her Diamox  Orders:  -     metFORMIN (GLUCOPHAGE) 1000 MG tablet; Take 1 tablet by mouth 2 times daily (with meals)  -     Tirzepatide 2.5 MG/0.5ML SOAJ; Inject 2.5 mg into the skin once a week  -     Comprehensive Metabolic Panel  -     CBC with Auto Differential  -     Lipid, Fasting    Class 3 severe obesity due to excess calories with serious comorbidity and body mass index (BMI) of 45.0 to 49.9 in adult  Comments:  She is not eligible for GLP-1 drugs.    Orders:  -     metFORMIN (GLUCOPHAGE) 1000 MG tablet; Take 1 tablet by mouth 2 times daily (with meals)  -     Tirzepatide 2.5 MG/0.5ML SOAJ; Inject 2.5 mg into the skin once a week  -     Comprehensive Metabolic Panel    Vitamin D deficiency  -     vitamin D (ERGOCALCIFEROL) 1.25 MG (05352 UT) CAPS capsule; Take 1 capsule by mouth once a week    Cervical cancer screening  -     [facility] - Lucia Huang MD, Obstetrics, [facility]        Vitals:    11/18/24 1431   BP: 114/70   Pulse: 91   Temp: 97.1 °F (36.2 °C)   SpO2: 99%       Physical Exam  Vitals reviewed.   Constitutional:       General: She is not in acute distress.     Appearance: She is well-developed. She is not diaphoretic.   HENT:      Head: Normocephalic and atraumatic.   Cardiovascular:      Rate and Rhythm: Normal rate and regular rhythm.      Pulses: Normal pulses.      Heart sounds: Normal heart sounds. No murmur heard.     No friction rub. No gallop.   Pulmonary:      Effort: Pulmonary effort is normal. No respiratory distress.      Breath sounds: Normal breath sounds. No stridor. No wheezing, rhonchi or rales.   Chest:      Chest wall: No tenderness.

## 2024-11-19 DIAGNOSIS — E03.9 HYPOTHYROIDISM (ACQUIRED): Chronic | ICD-10-CM

## 2024-11-19 LAB
ALBUMIN SERPL-MCNC: 4.5 G/DL (ref 3.4–5)
ALBUMIN/GLOB SERPL: 2 {RATIO} (ref 1.1–2.2)
ALP SERPL-CCNC: 76 U/L (ref 40–129)
ALT SERPL-CCNC: 16 U/L (ref 10–40)
ANION GAP SERPL CALCULATED.3IONS-SCNC: 13 MMOL/L (ref 3–16)
AST SERPL-CCNC: 21 U/L (ref 15–37)
BASOPHILS # BLD: 0.1 K/UL (ref 0–0.2)
BASOPHILS NFR BLD: 1 %
BILIRUB SERPL-MCNC: <0.2 MG/DL (ref 0–1)
BUN SERPL-MCNC: 13 MG/DL (ref 7–20)
CALCIUM SERPL-MCNC: 9.4 MG/DL (ref 8.3–10.6)
CHLORIDE SERPL-SCNC: 104 MMOL/L (ref 99–110)
CHOLEST SERPL-MCNC: 220 MG/DL (ref 0–199)
CO2 SERPL-SCNC: 21 MMOL/L (ref 21–32)
CREAT SERPL-MCNC: 0.7 MG/DL (ref 0.6–1.1)
DEPRECATED RDW RBC AUTO: 14 % (ref 12.4–15.4)
EOSINOPHIL # BLD: 0.5 K/UL (ref 0–0.6)
EOSINOPHIL NFR BLD: 6 %
GFR SERPLBLD CREATININE-BSD FMLA CKD-EPI: >90 ML/MIN/{1.73_M2}
GLUCOSE SERPL-MCNC: 91 MG/DL (ref 70–99)
HCT VFR BLD AUTO: 39.9 % (ref 36–48)
HDLC SERPL-MCNC: 62 MG/DL (ref 40–60)
HGB BLD-MCNC: 13.4 G/DL (ref 12–16)
LDL CHOLESTEROL: 124 MG/DL
LYMPHOCYTES # BLD: 2.8 K/UL (ref 1–5.1)
LYMPHOCYTES NFR BLD: 35.4 %
MCH RBC QN AUTO: 30.1 PG (ref 26–34)
MCHC RBC AUTO-ENTMCNC: 33.4 G/DL (ref 31–36)
MCV RBC AUTO: 89.9 FL (ref 80–100)
MONOCYTES # BLD: 0.5 K/UL (ref 0–1.3)
MONOCYTES NFR BLD: 6.4 %
NEUTROPHILS # BLD: 4 K/UL (ref 1.7–7.7)
NEUTROPHILS NFR BLD: 51.2 %
PLATELET # BLD AUTO: 270 K/UL (ref 135–450)
PMV BLD AUTO: 9.8 FL (ref 5–10.5)
POTASSIUM SERPL-SCNC: 4.1 MMOL/L (ref 3.5–5.1)
PROT SERPL-MCNC: 6.8 G/DL (ref 6.4–8.2)
RBC # BLD AUTO: 4.44 M/UL (ref 4–5.2)
SODIUM SERPL-SCNC: 138 MMOL/L (ref 136–145)
T4 FREE SERPL-MCNC: 0.7 NG/DL (ref 0.9–1.8)
TRIGL SERPL-MCNC: 169 MG/DL (ref 0–150)
TSH SERPL DL<=0.005 MIU/L-ACNC: 19.9 UIU/ML (ref 0.27–4.2)
VLDLC SERPL CALC-MCNC: 34 MG/DL
WBC # BLD AUTO: 7.9 K/UL (ref 4–11)

## 2024-11-19 RX ORDER — LEVOTHYROXINE SODIUM 100 UG/1
100 TABLET ORAL DAILY
Qty: 90 TABLET | Refills: 1 | Status: SHIPPED | OUTPATIENT
Start: 2024-11-19

## 2024-11-20 ENCOUNTER — TELEPHONE (OUTPATIENT)
Dept: ADMINISTRATIVE | Age: 44
End: 2024-11-20

## 2024-11-20 NOTE — TELEPHONE ENCOUNTER
Submitted PA for Mounjaro 2.5MG/0.5ML auto-injectors   Via CMM Key: [id number] STATUS: PENDING.    Follow up done daily; if no decision with in three days we will refax.  If another three days goes by with no decision will call the insurance for status.

## 2024-11-22 NOTE — TELEPHONE ENCOUNTER
The medication was DENIED;    Generic Denial: Auto response per portal. No letter generated. please see note below    Note :    Coverage is provided when the prescribed medication is used for a Food and Drug Administration (FDA) approved indication. Approved indications include the treatment of diabetes mellitus, type 2. The [facility] Policy for Medical Necessity as posted on the [facility] website and [facility] Preferred Drug List criteria were reviewed and per [place] Administrative Code Rule 5160-1-01 (C) and 5160-26-03 (B), a medically necessary service must include: generally accepted standards of medical practice, be clinically appropriate in administration, treatment and outcome and be the lowest cost alternative to effectively treat the condition. Please contact your provider to assist you with other treatment options that might be covered under your benefit package, or other services that might be available through the community.     If you want an APPEAL; please note in this encounter what new information you would like to APPEAL with.  Once complete route back to PA POOL.    If this requires a response please respond to the pool ( [facility] MEDICATION PRE-AUTH).      Thank you please advise patient.

## 2024-12-27 ENCOUNTER — TELEPHONE (OUTPATIENT)
Dept: INTERNAL MEDICINE CLINIC | Age: 44
End: 2024-12-27

## 2024-12-27 NOTE — TELEPHONE ENCOUNTER
Appointment Request From: Neena Carreon     With Provider: Dr. Daryn Malik MD [[facility]]     Preferred Date Range: 12/27/2024 - 12/27/2024     Preferred Times: Any Time     Reason for visit: Request an Appointment     Comments:  Slipped and fell down steps/ out of blood pressure medicine     *Spoke w/patient, she is c/o pain in her shoulders/back/tailbone from a fall on 12/20/24.  The only pain meds she can take is Tylenol which is not offering much relief.  I suggested patient go to after hours ortho @ [facility] for eval &treat.   She agreed and will send a [employer] message to get her b/p meds refilled.

## 2025-05-19 ENCOUNTER — OFFICE VISIT (OUTPATIENT)
Dept: INTERNAL MEDICINE CLINIC | Age: 45
End: 2025-05-19
Payer: COMMERCIAL

## 2025-05-19 VITALS
WEIGHT: 284.6 LBS | DIASTOLIC BLOOD PRESSURE: 98 MMHG | HEART RATE: 83 BPM | BODY MASS INDEX: 42.15 KG/M2 | SYSTOLIC BLOOD PRESSURE: 130 MMHG | OXYGEN SATURATION: 97 % | HEIGHT: 69 IN

## 2025-05-19 DIAGNOSIS — I10 ESSENTIAL HYPERTENSION: ICD-10-CM

## 2025-05-19 DIAGNOSIS — F11.21 OPIOID USE DISORDER, MODERATE, IN SUSTAINED REMISSION (HCC): ICD-10-CM

## 2025-05-19 DIAGNOSIS — E03.9 HYPOTHYROIDISM (ACQUIRED): Primary | ICD-10-CM

## 2025-05-19 DIAGNOSIS — Z11.59 NEED FOR HEPATITIS C SCREENING TEST: ICD-10-CM

## 2025-05-19 DIAGNOSIS — E66.813 CLASS 3 SEVERE OBESITY DUE TO EXCESS CALORIES WITH SERIOUS COMORBIDITY AND BODY MASS INDEX (BMI) OF 45.0 TO 49.9 IN ADULT (HCC): Chronic | ICD-10-CM

## 2025-05-19 DIAGNOSIS — G93.2 PSEUDOTUMOR CEREBRI: ICD-10-CM

## 2025-05-19 LAB
BILIRUBIN, POC: NORMAL
BLOOD URINE, POC: NORMAL
CLARITY, POC: NORMAL
COLOR, POC: NORMAL
GLUCOSE URINE, POC: NORMAL MG/DL
KETONES, POC: NORMAL MG/DL
LEUKOCYTE EST, POC: NORMAL
NITRITE, POC: NORMAL
PH, POC: 6
PROTEIN, POC: NORMAL MG/DL
SPECIFIC GRAVITY, POC: 1.03
UROBILINOGEN, POC: 0.2 MG/DL

## 2025-05-19 PROCEDURE — 3080F DIAST BP >= 90 MM HG: CPT | Performed by: INTERNAL MEDICINE

## 2025-05-19 PROCEDURE — 3075F SYST BP GE 130 - 139MM HG: CPT | Performed by: INTERNAL MEDICINE

## 2025-05-19 PROCEDURE — G8417 CALC BMI ABV UP PARAM F/U: HCPCS | Performed by: INTERNAL MEDICINE

## 2025-05-19 PROCEDURE — 99213 OFFICE O/P EST LOW 20 MIN: CPT | Performed by: INTERNAL MEDICINE

## 2025-05-19 PROCEDURE — G8427 DOCREV CUR MEDS BY ELIG CLIN: HCPCS | Performed by: INTERNAL MEDICINE

## 2025-05-19 PROCEDURE — 4004F PT TOBACCO SCREEN RCVD TLK: CPT | Performed by: INTERNAL MEDICINE

## 2025-05-19 PROCEDURE — 81002 URINALYSIS NONAUTO W/O SCOPE: CPT | Performed by: INTERNAL MEDICINE

## 2025-05-19 RX ORDER — VALSARTAN AND HYDROCHLOROTHIAZIDE 160; 12.5 MG/1; MG/1
1 TABLET, FILM COATED ORAL DAILY
Qty: 90 TABLET | Refills: 1 | Status: SHIPPED | OUTPATIENT
Start: 2025-05-19

## 2025-05-19 SDOH — ECONOMIC STABILITY: FOOD INSECURITY: WITHIN THE PAST 12 MONTHS, THE FOOD YOU BOUGHT JUST DIDN'T LAST AND YOU DIDN'T HAVE MONEY TO GET MORE.: NEVER TRUE

## 2025-05-19 SDOH — ECONOMIC STABILITY: FOOD INSECURITY: WITHIN THE PAST 12 MONTHS, YOU WORRIED THAT YOUR FOOD WOULD RUN OUT BEFORE YOU GOT MONEY TO BUY MORE.: NEVER TRUE

## 2025-05-19 ASSESSMENT — PATIENT HEALTH QUESTIONNAIRE - PHQ9
SUM OF ALL RESPONSES TO PHQ QUESTIONS 1-9: 2
2. FEELING DOWN, DEPRESSED OR HOPELESS: SEVERAL DAYS
1. LITTLE INTEREST OR PLEASURE IN DOING THINGS: SEVERAL DAYS
SUM OF ALL RESPONSES TO PHQ QUESTIONS 1-9: 2

## 2025-05-19 NOTE — PROGRESS NOTES
Status: She is alert and oriented to person, place, and time.      Cranial Nerves: No cranial nerve deficit.   Psychiatric:         Behavior: Behavior normal.         Thought Content: Thought content normal.         Judgment: Judgment normal.               5/19/2025     3:04 PM 11/18/2024     2:37 PM 1/16/2024     9:35 AM 1/15/2024    11:07 AM 12/5/2023     8:07 AM 10/31/2023     9:36 AM 10/18/2023     3:58 PM   PHQ Scores   PHQ2 Score 2 2 0 0 2 2 0   PHQ9 Score 2 10 0 0 8 7 3     Interpretation of Total Score Depression Severity: 1-4 = Minimal depression, 5-9 = Mild depression, 10-14 = Moderate depression, 15-19 = Moderately severe depression, 20-27 = Severe depression           Daryn Malik MD  FACP

## 2025-05-20 ENCOUNTER — RESULTS FOLLOW-UP (OUTPATIENT)
Dept: INTERNAL MEDICINE CLINIC | Age: 45
End: 2025-05-20

## 2025-05-20 DIAGNOSIS — E03.9 HYPOTHYROIDISM (ACQUIRED): Chronic | ICD-10-CM

## 2025-05-20 LAB
HCV AB SERPL QL IA: NORMAL
T4 FREE SERPL-MCNC: 1.1 NG/DL (ref 0.9–1.8)
TSH SERPL DL<=0.005 MIU/L-ACNC: 4.59 UIU/ML (ref 0.27–4.2)

## 2025-05-21 RX ORDER — LEVOTHYROXINE SODIUM 112 UG/1
112 TABLET ORAL DAILY
Qty: 90 TABLET | Refills: 1 | Status: SHIPPED | OUTPATIENT
Start: 2025-05-21

## 2025-06-05 ENCOUNTER — TELEPHONE (OUTPATIENT)
Dept: INTERNAL MEDICINE CLINIC | Age: 45
End: 2025-06-05

## 2025-06-05 NOTE — TELEPHONE ENCOUNTER
[facility] Dr. Ellsworth  [phone]    *Office will be open Monday, 6/9/25 at 9:00 am    Patient was seen in office  6/4/25. Says patient has severe pain, requests percocet. Office typically does not prescribe opioids. Has decided to prescribe tramadole to patient. Wishes to speak with Dr. Malik to explain.

## 2025-06-06 NOTE — TELEPHONE ENCOUNTER
Call returned.  LM on VM to  after no answer and went to .    Daryn Malik MD  [facility]

## 2025-09-03 ENCOUNTER — OFFICE VISIT (OUTPATIENT)
Dept: INTERNAL MEDICINE CLINIC | Age: 45
End: 2025-09-03
Payer: COMMERCIAL

## 2025-09-03 VITALS
DIASTOLIC BLOOD PRESSURE: 90 MMHG | HEIGHT: 69 IN | WEIGHT: 290 LBS | BODY MASS INDEX: 42.95 KG/M2 | OXYGEN SATURATION: 99 % | HEART RATE: 81 BPM | SYSTOLIC BLOOD PRESSURE: 122 MMHG

## 2025-09-03 DIAGNOSIS — E03.9 HYPOTHYROIDISM (ACQUIRED): Primary | ICD-10-CM

## 2025-09-03 DIAGNOSIS — I10 ESSENTIAL HYPERTENSION: ICD-10-CM

## 2025-09-03 PROCEDURE — 4004F PT TOBACCO SCREEN RCVD TLK: CPT | Performed by: INTERNAL MEDICINE

## 2025-09-03 PROCEDURE — 3074F SYST BP LT 130 MM HG: CPT | Performed by: INTERNAL MEDICINE

## 2025-09-03 PROCEDURE — 3080F DIAST BP >= 90 MM HG: CPT | Performed by: INTERNAL MEDICINE

## 2025-09-03 PROCEDURE — G8427 DOCREV CUR MEDS BY ELIG CLIN: HCPCS | Performed by: INTERNAL MEDICINE

## 2025-09-03 PROCEDURE — G8417 CALC BMI ABV UP PARAM F/U: HCPCS | Performed by: INTERNAL MEDICINE

## 2025-09-03 PROCEDURE — 99212 OFFICE O/P EST SF 10 MIN: CPT | Performed by: INTERNAL MEDICINE

## 2025-09-03 RX ORDER — VALSARTAN AND HYDROCHLOROTHIAZIDE 160; 12.5 MG/1; MG/1
1 TABLET, FILM COATED ORAL DAILY
Qty: 90 TABLET | Refills: 1 | Status: SHIPPED | OUTPATIENT
Start: 2025-09-03

## 2025-09-03 SDOH — ECONOMIC STABILITY: FOOD INSECURITY: WITHIN THE PAST 12 MONTHS, YOU WORRIED THAT YOUR FOOD WOULD RUN OUT BEFORE YOU GOT MONEY TO BUY MORE.: NEVER TRUE

## 2025-09-03 SDOH — ECONOMIC STABILITY: FOOD INSECURITY: WITHIN THE PAST 12 MONTHS, THE FOOD YOU BOUGHT JUST DIDN'T LAST AND YOU DIDN'T HAVE MONEY TO GET MORE.: NEVER TRUE

## 2025-09-03 ASSESSMENT — PATIENT HEALTH QUESTIONNAIRE - PHQ9
SUM OF ALL RESPONSES TO PHQ QUESTIONS 1-9: 0
SUM OF ALL RESPONSES TO PHQ QUESTIONS 1-9: 0
2. FEELING DOWN, DEPRESSED OR HOPELESS: NOT AT ALL
1. LITTLE INTEREST OR PLEASURE IN DOING THINGS: NOT AT ALL
SUM OF ALL RESPONSES TO PHQ QUESTIONS 1-9: 0
SUM OF ALL RESPONSES TO PHQ QUESTIONS 1-9: 0